# Patient Record
Sex: MALE | Race: WHITE | NOT HISPANIC OR LATINO | URBAN - METROPOLITAN AREA
[De-identification: names, ages, dates, MRNs, and addresses within clinical notes are randomized per-mention and may not be internally consistent; named-entity substitution may affect disease eponyms.]

---

## 2024-07-25 ENCOUNTER — OFFICE VISIT (OUTPATIENT)
Dept: HEMATOLOGY/ONCOLOGY | Facility: CLINIC | Age: 54
End: 2024-07-25
Payer: COMMERCIAL

## 2024-07-25 ENCOUNTER — DOCUMENTATION ONLY (OUTPATIENT)
Dept: HEMATOLOGY/ONCOLOGY | Facility: CLINIC | Age: 54
End: 2024-07-25

## 2024-07-25 VITALS
SYSTOLIC BLOOD PRESSURE: 138 MMHG | HEIGHT: 71 IN | HEART RATE: 53 BPM | DIASTOLIC BLOOD PRESSURE: 89 MMHG | OXYGEN SATURATION: 95 % | RESPIRATION RATE: 16 BRPM | WEIGHT: 197 LBS | BODY MASS INDEX: 27.58 KG/M2

## 2024-07-25 DIAGNOSIS — D48.7 NEOPLASM OF UNCERTAIN BEHAVIOR OF OTHER SPECIFIED SITES: Primary | ICD-10-CM

## 2024-07-25 DIAGNOSIS — N17.9 ACUTE RENAL FAILURE, UNSPECIFIED ACUTE RENAL FAILURE TYPE: ICD-10-CM

## 2024-07-25 DIAGNOSIS — D47.2 MGUS (MONOCLONAL GAMMOPATHY OF UNKNOWN SIGNIFICANCE): ICD-10-CM

## 2024-07-25 LAB
ALBUMIN SERPL BCP-MCNC: 4.1 G/DL (ref 3.4–5)
ALBUMIN/GLOBULIN RATIO: 1.08 RATIO (ref 1.1–1.8)
ALP SERPL-CCNC: 113 U/L (ref 46–116)
ALT SERPL W P-5'-P-CCNC: 27 U/L (ref 12–78)
ANION GAP SERPL CALC-SCNC: 8 MMOL/L (ref 3–11)
AST SERPL-CCNC: 17 U/L (ref 15–37)
BASOPHILS NFR BLD: 0.6 % (ref 0–3)
BILIRUB SERPL-MCNC: 0.5 MG/DL (ref 0–1)
BUN SERPL-MCNC: 28 MG/DL (ref 7–18)
BUN/CREAT SERPL: 14.35 RATIO (ref 7–18)
CALCIUM SERPL-MCNC: 9.2 MG/DL (ref 8.8–10.5)
CHLORIDE SERPL-SCNC: 103 MMOL/L (ref 100–108)
CO2 SERPL-SCNC: 24 MMOL/L (ref 21–32)
CREAT SERPL-MCNC: 1.95 MG/DL (ref 0.7–1.3)
EOSINOPHIL NFR BLD: 2 % (ref 1–3)
ERYTHROCYTE [DISTWIDTH] IN BLOOD BY AUTOMATED COUNT: 12.3 % (ref 12.5–18)
GFR ESTIMATION: 36
GLOBULIN: 3.8 G/DL (ref 2.3–3.5)
GLUCOSE SERPL-MCNC: 109 MG/DL (ref 70–110)
HCT VFR BLD AUTO: 51.2 % (ref 42–52)
HGB BLD-MCNC: 17.6 G/DL (ref 14–18)
LDH SERPL L TO P-CCNC: 180 U/L (ref 82–234)
LYMPHOCYTES NFR BLD: 31.8 % (ref 25–40)
MCH RBC QN AUTO: 30.7 PG (ref 27–31.2)
MCHC RBC AUTO-ENTMCNC: 34.4 G/DL (ref 31.8–35.4)
MCV RBC AUTO: 89.4 FL (ref 80–97)
MONOCYTES NFR BLD: 6.4 % (ref 1–15)
NEUTROPHILS # BLD AUTO: 4.6 10*3/UL (ref 1.8–7.7)
NEUTROPHILS NFR BLD: 58.8 % (ref 37–80)
NUCLEATED RED BLOOD CELLS: 0 %
PLATELETS: 228 10*3/UL (ref 142–424)
POTASSIUM SERPL-SCNC: 4.2 MMOL/L (ref 3.6–5.2)
PROT SERPL-MCNC: 7.9 G/DL (ref 6.4–8.2)
RBC # BLD AUTO: 5.73 10*6/UL (ref 4.7–6.1)
SODIUM BLD-SCNC: 135 MMOL/L (ref 135–145)
WBC # BLD: 7.8 10*3/UL (ref 4.6–10.2)

## 2024-07-25 NOTE — PROGRESS NOTES
HEMATOLOGY INITIAL CONSULTATION NOTE    Patient ID: Miky Carrasco is a 53 y.o. male.    Chief Complaint:  Suspicion for MGUS versus multiple myeloma    HPI:  Patient is a 53-year-old male with past medical history of hyperlipidemia who recently was evaluated by Nephrology for worsening kidney function and underwent further lab work including serum protein electrophoresis which showed findings concerning for possible MGUS versus active multiple myeloma with decline in kidney.  Patient presents to our clinic today for further evaluation.  Voices no acute complaints. Denies bone pain, activity and appetite changes.              History reviewed. No pertinent past medical history.    Family History   Problem Relation Name Age of Onset    Hypertension Mother      Diabetes Mother      Skin cancer Mother      Hypertension Father      Cancer Sister         Social History     Socioeconomic History    Marital status:    Tobacco Use    Smoking status: Every Day     Passive exposure: Current    Smokeless tobacco: Current     Types: Chew   Substance and Sexual Activity    Alcohol use: Not Currently    Drug use: Not Currently     Types: Marijuana    Sexual activity: Yes         History reviewed. No pertinent surgical history.      Review of systems:  Review of Systems   Constitutional:  Negative for activity change, appetite change, chills, diaphoresis, fatigue and unexpected weight change.   HENT:  Negative for congestion, facial swelling, hearing loss, mouth sores, trouble swallowing and voice change.    Eyes:  Negative for photophobia, pain, discharge and itching.   Respiratory:  Negative for apnea, cough, choking, chest tightness and shortness of breath.    Cardiovascular:  Negative for chest pain, palpitations and leg swelling.   Gastrointestinal:  Negative for abdominal distention, abdominal pain, anal bleeding and blood in stool.   Endocrine: Negative for cold intolerance, heat intolerance, polydipsia and  polyphagia.   Genitourinary:  Negative for difficulty urinating, dysuria, flank pain and hematuria.   Musculoskeletal:  Negative for arthralgias, back pain, joint swelling, myalgias, neck pain and neck stiffness.   Skin:  Negative for color change, pallor and wound.   Allergic/Immunologic: Negative for environmental allergies, food allergies and immunocompromised state.   Neurological:  Negative for dizziness, seizures, facial asymmetry, speech difficulty, light-headedness, numbness and headaches.   Hematological:  Negative for adenopathy. Does not bruise/bleed easily.   Psychiatric/Behavioral:  Negative for agitation, behavioral problems, confusion, decreased concentration and sleep disturbance.                                Physical Exam  Vitals and nursing note reviewed.   Constitutional:       General: He is not in acute distress.     Appearance: Normal appearance. He is not ill-appearing.   HENT:      Head: Normocephalic and atraumatic.      Nose: No congestion or rhinorrhea.   Eyes:      General: No scleral icterus.     Extraocular Movements: Extraocular movements intact.      Pupils: Pupils are equal, round, and reactive to light.   Cardiovascular:      Rate and Rhythm: Normal rate and regular rhythm.      Pulses: Normal pulses.      Heart sounds: Normal heart sounds. No murmur heard.     No gallop.   Pulmonary:      Effort: Pulmonary effort is normal. No respiratory distress.      Breath sounds: Normal breath sounds. No stridor. No wheezing or rhonchi.   Abdominal:      General: Bowel sounds are normal. There is no distension.      Palpations: There is no mass.      Tenderness: There is no abdominal tenderness. There is no guarding.   Musculoskeletal:         General: No swelling, tenderness, deformity or signs of injury. Normal range of motion.      Cervical back: Normal range of motion and neck supple. No rigidity. No muscular tenderness.      Right lower leg: No edema.      Left lower leg: No edema.    Skin:     General: Skin is warm.      Coloration: Skin is not jaundiced or pale.      Findings: No bruising or lesion.   Neurological:      General: No focal deficit present.      Mental Status: He is alert and oriented to person, place, and time.      Cranial Nerves: No cranial nerve deficit.      Sensory: No sensory deficit.      Motor: No weakness.      Gait: Gait normal.   Psychiatric:         Mood and Affect: Mood normal.         Behavior: Behavior normal.         Thought Content: Thought content normal.       Vitals:    07/25/24 0831   BP: 138/89   Pulse: (!) 53   Resp: 16   Body surface area is 2.12 meters squared.    Assessment/Plan:      ECOG 1    MGUS versus multiple myeloma:    == reviewed prior lab work done with Nephrology.  Patient is noted to have worsening renal failure and also has presence of monoclonal band on immuno fixation studies.  I will initiate workup for multiple myeloma and will include a bone marrow biopsy as well as a PET scan to rule out any osteolytic lesions.  Discussed these findings in detail with patient giving him opportunity to ask questions          Plan:  Initiate workup for multiple myeloma  Referral to IR for bone marrow biopsy  PET scan to rule out osteolytic lesions          Pt is instructed to RTC with labs for continued monitoring of treatment as instructed.     Total time spent in counseling and discussion about further management options including relevant lab work, treatment,  prognosis, medications and intended side effects was more than 60 minutes. More than 50 % of the time was spent in counseling and coordination of care.  This includes face to face time and non-face to face time preparing to see the patient (eg, review of tests), Obtaining and/or reviewing separately obtained history, Documenting clinical information in the electronic or other health record, Independently interpreting resultsand communicating results to the patient/family/caregiver, or Care  coordination.

## 2024-08-02 ENCOUNTER — OUTSIDE PLACE OF SERVICE (OUTPATIENT)
Dept: INTERVENTIONAL RADIOLOGY/VASCULAR | Facility: CLINIC | Age: 54
End: 2024-08-02
Payer: COMMERCIAL

## 2024-08-02 LAB
ABS NRBC COUNT: 0 THOU/UL (ref 0–0.01)
ABSOLUTE BASOPHIL: 0 10*3/UL (ref 0–0.3)
ABSOLUTE EOSINOPHIL: 0.1 10*3/UL (ref 0–0.6)
ABSOLUTE EOSINOPHIL: 0.1 10*3/UL (ref 0–0.6)
ABSOLUTE IMMATURE GRAN: 0.04 THOU/UL (ref 0–0.03)
ABSOLUTE LYMPHOCYTE: 2 10*3/UL (ref 1.2–4)
ABSOLUTE LYMPHOCYTE: 2.3 10*3/UL (ref 1.2–4)
ABSOLUTE MONOCYTE: 0.4 10*3/UL (ref 0.1–0.8)
ABSOLUTE MONOCYTE: 0.6 10*3/UL (ref 0.1–0.8)
ALBUMIN SERPL BCP-MCNC: 4.2 G/DL (ref 3.4–5)
ALP SERPL-CCNC: 98 U/L (ref 45–117)
ALT SERPL W P-5'-P-CCNC: 28 U/L (ref 16–61)
ANION GAP SERPL CALC-SCNC: 6 MMOL/L (ref 3–11)
APTT PPP: 34.2 SEC (ref 26–38.7)
AST SERPL-CCNC: 11 U/L (ref 15–37)
BASOPHILS NFR BLD: 0.4 % (ref 0–3)
BILIRUB SERPL-MCNC: 0.4 MG/DL (ref 0.2–1)
BUN SERPL-MCNC: 31 MG/DL (ref 7–18)
BUN/CREAT SERPL: 14.02 RATIO
CALCIUM SERPL-MCNC: 9.3 MG/DL (ref 8.5–10.1)
CELLS COUNTED: 100
CHLORIDE SERPL-SCNC: 110 MMOL/L (ref 98–107)
CO2 SERPL-SCNC: 26 MMOL/L (ref 21–32)
CREAT SERPL-MCNC: 2.21 MG/DL (ref 0.7–1.3)
EOSINOPHIL NFR BLD: 0.6 % (ref 0–6)
EOSINOPHIL NFR BLD: 1 % (ref 0–6)
ERYTHROCYTE [DISTWIDTH] IN BLOOD BY AUTOMATED COUNT: 12.1 % (ref 0–15.5)
ERYTHROCYTE [DISTWIDTH] IN BLOOD BY AUTOMATED COUNT: 12.1 % (ref 0–15.5)
GFR ESTIMATION: 31
GLUCOSE SERPL-MCNC: 122 MG/DL (ref 74–106)
HCT VFR BLD AUTO: 49.3 % (ref 42–52)
HCT VFR BLD AUTO: 49.3 % (ref 42–52)
HGB BLD-MCNC: 16.4 G/DL (ref 14–18)
HGB BLD-MCNC: 16.4 G/DL (ref 14–18)
IMMATURE GRANULOCYTES: 0.4 % (ref 0–0.43)
INR PPP: 0.9 INR (ref 0.9–1.1)
LYMPHOCYTES NFR BLD: 21.8 % (ref 20–45)
LYMPHOCYTES NFR BLD: 25 % (ref 20–45)
MCH RBC QN AUTO: 29.9 PG (ref 27–32)
MCH RBC QN AUTO: 29.9 PG (ref 27–32)
MCHC RBC AUTO-ENTMCNC: 33.3 % (ref 32–36)
MCHC RBC AUTO-ENTMCNC: 33.3 % (ref 32–36)
MCV RBC AUTO: 89.8 FL (ref 80–97)
MCV RBC AUTO: 89.8 FL (ref 80–97)
MONOCYTES NFR BLD: 4 % (ref 2–10)
MONOCYTES NFR BLD: 6.9 % (ref 2–10)
NEUTROPHILS # BLD AUTO: 6.4 10*3/UL (ref 1.8–7.7)
NEUTROPHILS # BLD AUTO: 6.5 10*3/UL (ref 1.4–7)
NEUTROPHILS NFR BLD: 69.9 % (ref 50–80)
NEUTROPHILS NFR BLD: 70 % (ref 50–80)
NUCLEATED RBC-MANUAL: 0 /100 WBC
NUCLEATED RED BLOOD CELLS: 0 % (ref 0–0.2)
PLATELET MORPHOLOGY: NORMAL
PLATELETS: 213 10*3/UL (ref 130–400)
PLATELETS: 213 10*3/UL (ref 130–400)
PMV BLD AUTO: 10.4 FL (ref 9.2–12.2)
PMV BLD AUTO: 10.4 FL (ref 9.2–12.2)
POTASSIUM SERPL-SCNC: 4.4 MMOL/L (ref 3.5–5.1)
PROT SERPL-MCNC: 7.6 G/DL (ref 6.4–8.2)
PROTHROMBIN TIME: 10.7 SEC (ref 10.2–12.9)
RBC # BLD AUTO: 5.49 10*6/UL (ref 4.7–6.1)
RBC # BLD AUTO: 5.49 10*6/UL (ref 4.7–6.1)
RBC MORPH BLD: NORMAL
SMALL PLATELETS BLD QL SMEAR: NORMAL
SODIUM BLD-SCNC: 142 MMOL/L (ref 131–143)
WBC # BLD: 9.2 10*3/UL (ref 4.5–10)
WBC # BLD: 9.2 10*3/UL (ref 4.5–10)

## 2024-08-02 PROCEDURE — 38222 DX BONE MARROW BX & ASPIR: CPT | Mod: LT,,, | Performed by: RADIOLOGY

## 2024-08-02 PROCEDURE — 77002 NEEDLE LOCALIZATION BY XRAY: CPT | Mod: 26,LT,, | Performed by: RADIOLOGY

## 2024-08-12 ENCOUNTER — TELEPHONE (OUTPATIENT)
Dept: HEMATOLOGY/ONCOLOGY | Facility: CLINIC | Age: 54
End: 2024-08-12
Payer: COMMERCIAL

## 2024-08-12 NOTE — TELEPHONE ENCOUNTER
----- Message from America Phelan sent at 8/12/2024  8:59 AM CDT -----  Contact: self  Patient is requesting a call back regarding results. Please call back at [phone]

## 2024-08-15 ENCOUNTER — OFFICE VISIT (OUTPATIENT)
Dept: HEMATOLOGY/ONCOLOGY | Facility: CLINIC | Age: 54
End: 2024-08-15
Payer: COMMERCIAL

## 2024-08-15 VITALS
HEART RATE: 60 BPM | BODY MASS INDEX: 27.09 KG/M2 | OXYGEN SATURATION: 96 % | SYSTOLIC BLOOD PRESSURE: 110 MMHG | HEIGHT: 71 IN | DIASTOLIC BLOOD PRESSURE: 74 MMHG | WEIGHT: 193.5 LBS | RESPIRATION RATE: 16 BRPM

## 2024-08-15 DIAGNOSIS — C90.00 MULTIPLE MYELOMA NOT HAVING ACHIEVED REMISSION: Primary | ICD-10-CM

## 2024-08-15 NOTE — PROGRESS NOTES
HEMATOLOGY FOLLOW UP CONSULTATION NOTE    Patient ID: Miky Carrasco is a 53 y.o. male.    Chief Complaint:  Multiple myeloma    HPI:  Patient is a 53-year-old male with past medical history of hyperlipidemia who recently was evaluated by Nephrology for worsening kidney function and underwent further lab work including serum protein electrophoresis which showed findings concerning for possible MGUS versus active multiple myeloma with decline in kidney.  Patient presents to our clinic today for further evaluation.  Voices no acute complaints. Denies bone pain, activity and appetite changes.              History reviewed. No pertinent past medical history.    Family History   Problem Relation Name Age of Onset    Hypertension Mother      Diabetes Mother      Skin cancer Mother      Hypertension Father      Cancer Sister         Social History     Socioeconomic History    Marital status:    Tobacco Use    Smoking status: Every Day     Passive exposure: Current    Smokeless tobacco: Current     Types: Chew   Substance and Sexual Activity    Alcohol use: Not Currently    Drug use: Not Currently     Types: Marijuana    Sexual activity: Yes     Social Determinants of Health     Financial Resource Strain: Low Risk  (8/14/2024)    Overall Financial Resource Strain (CARDIA)     Difficulty of Paying Living Expenses: Not very hard   Food Insecurity: Unknown (8/14/2024)    Hunger Vital Sign     Worried About Running Out of Food in the Last Year: Never true   Physical Activity: Insufficiently Active (8/14/2024)    Exercise Vital Sign     Days of Exercise per Week: 2 days     Minutes of Exercise per Session: 30 min   Stress: Stress Concern Present (8/14/2024)    [place] citizen [employer] - Occupational Stress Questionnaire     Feeling of Stress : To some extent   Housing Stability: Unknown (8/14/2024)    Housing Stability Vital Sign     Unable to Pay for Housing in the Last Year: Patient declined          History reviewed. No pertinent surgical history.      Review of systems:  Review of Systems   Constitutional:  Negative for activity change, appetite change, chills, diaphoresis, fatigue and unexpected weight change.   HENT:  Negative for congestion, facial swelling, hearing loss, mouth sores, trouble swallowing and voice change.    Eyes:  Negative for photophobia, pain, discharge and itching.   Respiratory:  Negative for apnea, cough, choking, chest tightness and shortness of breath.    Cardiovascular:  Negative for chest pain, palpitations and leg swelling.   Gastrointestinal:  Negative for abdominal distention, abdominal pain, anal bleeding and blood in stool.   Endocrine: Negative for cold intolerance, heat intolerance, polydipsia and polyphagia.   Genitourinary:  Negative for difficulty urinating, dysuria, flank pain and hematuria.   Musculoskeletal:  Negative for arthralgias, back pain, joint swelling, myalgias, neck pain and neck stiffness.   Skin:  Negative for color change, pallor and wound.   Allergic/Immunologic: Negative for environmental allergies, food allergies and immunocompromised state.   Neurological:  Negative for dizziness, seizures, facial asymmetry, speech difficulty, light-headedness, numbness and headaches.   Hematological:  Negative for adenopathy. Does not bruise/bleed easily.   Psychiatric/Behavioral:  Negative for agitation, behavioral problems, confusion, decreased concentration and sleep disturbance.                                Physical Exam  Vitals and nursing note reviewed.   Constitutional:       General: He is not in acute distress.     Appearance: Normal appearance. He is not ill-appearing.   HENT:      Head: Normocephalic and atraumatic.      Nose: No congestion or rhinorrhea.   Eyes:      General: No scleral icterus.     Extraocular Movements: Extraocular movements intact.      Pupils: Pupils are equal, round, and reactive to light.   Cardiovascular:      Rate and  Rhythm: Normal rate and regular rhythm.      Pulses: Normal pulses.      Heart sounds: Normal heart sounds. No murmur heard.     No gallop.   Pulmonary:      Effort: Pulmonary effort is normal. No respiratory distress.      Breath sounds: Normal breath sounds. No stridor. No wheezing or rhonchi.   Abdominal:      General: Bowel sounds are normal. There is no distension.      Palpations: There is no mass.      Tenderness: There is no abdominal tenderness. There is no guarding.   Musculoskeletal:         General: No swelling, tenderness, deformity or signs of injury. Normal range of motion.      Cervical back: Normal range of motion and neck supple. No rigidity. No muscular tenderness.      Right lower leg: No edema.      Left lower leg: No edema.   Skin:     General: Skin is warm.      Coloration: Skin is not jaundiced or pale.      Findings: No bruising or lesion.   Neurological:      General: No focal deficit present.      Mental Status: He is alert and oriented to person, place, and time.      Cranial Nerves: No cranial nerve deficit.      Sensory: No sensory deficit.      Motor: No weakness.      Gait: Gait normal.   Psychiatric:         Mood and Affect: Mood normal.         Behavior: Behavior normal.         Thought Content: Thought content normal.       Vitals:    08/15/24 1529   BP: 110/74   Pulse: 60   Resp: 16   Body surface area is 2.1 meters squared.    Assessment/Plan:      ECOG 1    Multiple myeloma:    == reviewed prior lab work done with Nephrology.  Patient is noted to have worsening renal failure and also has presence of monoclonal band on immuno fixation studies.  I will initiate workup for multiple myeloma and will include a bone marrow biopsy as well as a PET scan to rule out any osteolytic lesions.  Discussed these findings in detail with patient giving him opportunity to ask questions  == 8/18/24:  Bone marrow biopsy done showed increased number of plasma cells.  No percentage mentioned but  reported to be more than normal and in the smoldering range.  With evidence of worsening renal failure along with elevated kappa free light chains and kappa/lambda free light chain ratio findings are consistent with active multiple myeloma.  Due to decline in kidney function my initial plan would be to initiate CyBorD.  Patient reports that he is on a job which requires lot of traveling and unable to come to clinics  regularly do receive his treatment.           Plan:  Start CyBorD      Return to clinic in 1 week for NP visit with labs for chemo education        Pt is instructed to RTC with labs for continued monitoring of treatment as instructed.     Total time spent in counseling and discussion about further management options including relevant lab work, treatment,  prognosis, medications and intended side effects was more than 60 minutes. More than 50 % of the time was spent in counseling and coordination of care.  This includes face to face time and non-face to face time preparing to see the patient (eg, review of tests), Obtaining and/or reviewing separately obtained history, Documenting clinical information in the electronic or other health record, Independently interpreting resultsand communicating results to the patient/family/caregiver, or Care coordination.

## 2024-08-26 ENCOUNTER — TELEPHONE (OUTPATIENT)
Dept: HEMATOLOGY/ONCOLOGY | Facility: CLINIC | Age: 54
End: 2024-08-26
Payer: COMMERCIAL

## 2024-08-26 DIAGNOSIS — C90.00 MULTIPLE MYELOMA NOT HAVING ACHIEVED REMISSION: Primary | ICD-10-CM

## 2024-08-26 RX ORDER — DEXAMETHASONE 4 MG/1
40 TABLET ORAL
Qty: 40 TABLET | Refills: 5 | Status: ACTIVE | OUTPATIENT
Start: 2024-08-26

## 2024-08-26 RX ORDER — CYCLOPHOSPHAMIDE 50 MG/1
600 CAPSULE ORAL
Qty: 48 CAPSULE | Refills: 5 | Status: ACTIVE | OUTPATIENT
Start: 2024-08-26

## 2024-08-26 RX ORDER — CYCLOPHOSPHAMIDE 25 MG/1
25 CAPSULE ORAL
Qty: 4 CAPSULE | Refills: 5 | Status: ACTIVE | OUTPATIENT
Start: 2024-08-26

## 2024-09-20 ENCOUNTER — TELEPHONE (OUTPATIENT)
Dept: HEMATOLOGY/ONCOLOGY | Facility: CLINIC | Age: 54
End: 2024-09-20
Payer: COMMERCIAL

## 2024-09-20 NOTE — TELEPHONE ENCOUNTER
----- Message from Sydni Allen sent at 9/20/2024  2:55 PM CDT -----  States he is scheduled for a Pet Scan at 8:00 on Monday at [facility]. States they haven't received the authorization from Dr Saunders. Please call Nallely Daileyhan [phone]. Thank you

## 2024-09-23 ENCOUNTER — CLINICAL SUPPORT (OUTPATIENT)
Dept: HEMATOLOGY/ONCOLOGY | Facility: CLINIC | Age: 54
End: 2024-09-23
Payer: COMMERCIAL

## 2024-09-23 VITALS
HEART RATE: 56 BPM | WEIGHT: 195.13 LBS | BODY MASS INDEX: 27.32 KG/M2 | DIASTOLIC BLOOD PRESSURE: 74 MMHG | OXYGEN SATURATION: 94 % | TEMPERATURE: 98 F | SYSTOLIC BLOOD PRESSURE: 137 MMHG | HEIGHT: 71 IN | RESPIRATION RATE: 16 BRPM

## 2024-09-23 DIAGNOSIS — C90.00 MULTIPLE MYELOMA NOT HAVING ACHIEVED REMISSION: Primary | ICD-10-CM

## 2024-09-23 DIAGNOSIS — Z79.899 IMMUNODEFICIENCY DUE TO CHEMOTHERAPY: ICD-10-CM

## 2024-09-23 DIAGNOSIS — T45.1X5A IMMUNODEFICIENCY DUE TO CHEMOTHERAPY: ICD-10-CM

## 2024-09-23 DIAGNOSIS — D84.821 IMMUNODEFICIENCY DUE TO CHEMOTHERAPY: ICD-10-CM

## 2024-09-23 PROBLEM — Z79.69 IMMUNODEFICIENCY DUE TO CHEMOTHERAPY: Status: ACTIVE | Noted: 2024-09-23

## 2024-09-23 LAB
ALBUMIN SERPL BCP-MCNC: 3.7 G/DL (ref 3.4–5)
ALBUMIN/GLOBULIN RATIO: 1.09 RATIO (ref 1.1–1.8)
ALP SERPL-CCNC: 102 U/L (ref 46–116)
ALT SERPL W P-5'-P-CCNC: 23 U/L (ref 12–78)
ANION GAP SERPL CALC-SCNC: 8 MMOL/L (ref 3–11)
AST SERPL-CCNC: 14 U/L (ref 15–37)
BASOPHILS NFR BLD: 0.5 % (ref 0–3)
BILIRUB SERPL-MCNC: 0.3 MG/DL (ref 0–1)
BUN SERPL-MCNC: 31 MG/DL (ref 7–18)
BUN/CREAT SERPL: 13.77 RATIO (ref 7–18)
CALCIUM SERPL-MCNC: 9.6 MG/DL (ref 8.8–10.5)
CHLORIDE SERPL-SCNC: 106 MMOL/L (ref 100–108)
CO2 SERPL-SCNC: 28 MMOL/L (ref 21–32)
CREAT SERPL-MCNC: 2.25 MG/DL (ref 0.7–1.3)
EOSINOPHIL NFR BLD: 1.7 % (ref 1–3)
ERYTHROCYTE [DISTWIDTH] IN BLOOD BY AUTOMATED COUNT: 12.4 % (ref 12.5–18)
GFR ESTIMATION: 31
GLOBULIN: 3.4 G/DL (ref 2.3–3.5)
GLUCOSE SERPL-MCNC: 96 MG/DL (ref 70–110)
HCT VFR BLD AUTO: 48.1 % (ref 42–52)
HGB BLD-MCNC: 16.3 G/DL (ref 14–18)
LYMPHOCYTES NFR BLD: 25.6 % (ref 25–40)
MAGNESIUM SERPL-MCNC: 2.3 MG/DL (ref 1.8–2.4)
MCH RBC QN AUTO: 30.5 PG (ref 27–31.2)
MCHC RBC AUTO-ENTMCNC: 33.9 G/DL (ref 31.8–35.4)
MCV RBC AUTO: 89.9 FL (ref 80–97)
MONOCYTES NFR BLD: 7.6 % (ref 1–15)
NEUTROPHILS # BLD AUTO: 5.89 10*3/UL (ref 1.8–7.7)
NEUTROPHILS NFR BLD: 63.9 % (ref 37–80)
NUCLEATED RED BLOOD CELLS: 0 %
PHOSPHATE FLD-MCNC: 3.2 MG/DL (ref 2.6–4.7)
PLATELETS: 218 10*3/UL (ref 142–424)
POTASSIUM SERPL-SCNC: 4.4 MMOL/L (ref 3.6–5.2)
PROT SERPL-MCNC: 7.1 G/DL (ref 6.4–8.2)
RBC # BLD AUTO: 5.35 10*6/UL (ref 4.7–6.1)
SODIUM BLD-SCNC: 142 MMOL/L (ref 135–145)
WBC # BLD: 9.2 10*3/UL (ref 4.6–10.2)

## 2024-09-23 PROCEDURE — 99215 OFFICE O/P EST HI 40 MIN: CPT | Mod: S$PBB,,, | Performed by: NURSE PRACTITIONER

## 2024-09-23 RX ORDER — BORTEZOMIB 3.5 MG/1
1.5 INJECTION, POWDER, LYOPHILIZED, FOR SOLUTION INTRAVENOUS; SUBCUTANEOUS
OUTPATIENT
Start: 2024-10-04

## 2024-09-23 RX ORDER — HEPARIN 100 UNIT/ML
500 SYRINGE INTRAVENOUS
Status: CANCELLED | OUTPATIENT
Start: 2024-09-27

## 2024-09-23 RX ORDER — BORTEZOMIB 3.5 MG/1
1.5 INJECTION, POWDER, LYOPHILIZED, FOR SOLUTION INTRAVENOUS; SUBCUTANEOUS
OUTPATIENT
Start: 2024-10-18

## 2024-09-23 RX ORDER — ACYCLOVIR 400 MG/1
400 TABLET ORAL DAILY
Qty: 30 TABLET | Refills: 11 | Status: SHIPPED | OUTPATIENT
Start: 2024-09-23 | End: 2025-09-23

## 2024-09-23 RX ORDER — BORTEZOMIB 3.5 MG/1
1.5 INJECTION, POWDER, LYOPHILIZED, FOR SOLUTION INTRAVENOUS; SUBCUTANEOUS
OUTPATIENT
Start: 2024-10-11

## 2024-09-23 RX ORDER — PALONOSETRON 0.05 MG/ML
0.25 INJECTION, SOLUTION INTRAVENOUS
Status: CANCELLED | OUTPATIENT
Start: 2024-09-27

## 2024-09-23 RX ORDER — PALONOSETRON 0.05 MG/ML
0.25 INJECTION, SOLUTION INTRAVENOUS
OUTPATIENT
Start: 2024-10-04

## 2024-09-23 RX ORDER — SODIUM CHLORIDE 0.9 % (FLUSH) 0.9 %
10 SYRINGE (ML) INJECTION
OUTPATIENT
Start: 2024-10-04

## 2024-09-23 RX ORDER — PALONOSETRON 0.05 MG/ML
0.25 INJECTION, SOLUTION INTRAVENOUS
OUTPATIENT
Start: 2024-10-18

## 2024-09-23 RX ORDER — HEPARIN 100 UNIT/ML
500 SYRINGE INTRAVENOUS
OUTPATIENT
Start: 2024-10-18

## 2024-09-23 RX ORDER — HEPARIN 100 UNIT/ML
500 SYRINGE INTRAVENOUS
OUTPATIENT
Start: 2024-10-11

## 2024-09-23 RX ORDER — SODIUM CHLORIDE 0.9 % (FLUSH) 0.9 %
10 SYRINGE (ML) INJECTION
Status: CANCELLED | OUTPATIENT
Start: 2024-09-27

## 2024-09-23 RX ORDER — HEPARIN 100 UNIT/ML
500 SYRINGE INTRAVENOUS
OUTPATIENT
Start: 2024-10-04

## 2024-09-23 RX ORDER — SODIUM CHLORIDE 0.9 % (FLUSH) 0.9 %
10 SYRINGE (ML) INJECTION
OUTPATIENT
Start: 2024-10-18

## 2024-09-23 RX ORDER — PALONOSETRON 0.05 MG/ML
0.25 INJECTION, SOLUTION INTRAVENOUS
OUTPATIENT
Start: 2024-10-11

## 2024-09-23 RX ORDER — VALACYCLOVIR HYDROCHLORIDE 500 MG/1
500 TABLET, FILM COATED ORAL DAILY
Qty: 30 TABLET | Refills: 0 | Status: CANCELLED | OUTPATIENT
Start: 2024-09-23 | End: 2024-10-23

## 2024-09-23 RX ORDER — BORTEZOMIB 3.5 MG/1
1.5 INJECTION, POWDER, LYOPHILIZED, FOR SOLUTION INTRAVENOUS; SUBCUTANEOUS
Status: CANCELLED | OUTPATIENT
Start: 2024-09-27

## 2024-09-23 RX ORDER — SULFAMETHOXAZOLE AND TRIMETHOPRIM 400; 80 MG/1; MG/1
1 TABLET ORAL DAILY
Qty: 30 TABLET | Refills: 11 | Status: SHIPPED | OUTPATIENT
Start: 2024-09-23 | End: 2025-09-18

## 2024-09-23 RX ORDER — SODIUM CHLORIDE 0.9 % (FLUSH) 0.9 %
10 SYRINGE (ML) INJECTION
OUTPATIENT
Start: 2024-10-11

## 2024-09-23 NOTE — PROGRESS NOTES
HEMATOLOGY FOLLOW UP CONSULTATION NOTE    Patient ID: Miky Carrasco is a 53 y.o. male.    Chief Complaint:  Multiple myeloma    HPI:  Patient is a 53-year-old male with past medical history of hyperlipidemia who recently was evaluated by Nephrology for worsening kidney function and underwent further lab work including serum protein electrophoresis which showed findings concerning for possible MGUS versus active multiple myeloma with decline in kidney.  Patient presents to our clinic today for further evaluation.  Voices no acute complaints. Denies bone pain, activity and appetite changes.              History reviewed. No pertinent past medical history.    Family History   Problem Relation Name Age of Onset    Hypertension Mother      Diabetes Mother      Skin cancer Mother      Hypertension Father      Cancer Sister         Social History     Socioeconomic History    Marital status:    Tobacco Use    Smoking status: Every Day     Passive exposure: Current    Smokeless tobacco: Current     Types: Chew   Substance and Sexual Activity    Alcohol use: Not Currently    Drug use: Not Currently     Types: Marijuana    Sexual activity: Yes     Social Determinants of Health     Financial Resource Strain: Low Risk  (8/14/2024)    Overall Financial Resource Strain (CARDIA)     Difficulty of Paying Living Expenses: Not very hard   Food Insecurity: Unknown (8/14/2024)    Hunger Vital Sign     Worried About Running Out of Food in the Last Year: Never true   Physical Activity: Insufficiently Active (8/14/2024)    Exercise Vital Sign     Days of Exercise per Week: 2 days     Minutes of Exercise per Session: 30 min   Stress: Stress Concern Present (8/14/2024)    [place] [employer] - Occupational Stress Questionnaire     Feeling of Stress : To some extent   Housing Stability: Unknown (8/14/2024)    Housing Stability Vital Sign     Unable to Pay for Housing in the Last Year: Patient declined          History reviewed. No pertinent surgical history.      Review of systems:  Review of Systems   Constitutional:  Negative for activity change, appetite change, chills, diaphoresis, fatigue and unexpected weight change.   HENT:  Negative for congestion, facial swelling, hearing loss, mouth sores, trouble swallowing and voice change.    Eyes:  Negative for photophobia, pain, discharge and itching.   Respiratory:  Negative for apnea, cough, choking, chest tightness and shortness of breath.    Cardiovascular:  Negative for chest pain, palpitations and leg swelling.   Gastrointestinal:  Negative for abdominal distention, abdominal pain, anal bleeding and blood in stool.   Endocrine: Negative for cold intolerance, heat intolerance, polydipsia and polyphagia.   Genitourinary:  Negative for difficulty urinating, dysuria, flank pain and hematuria.   Musculoskeletal:  Negative for arthralgias, back pain, joint swelling, myalgias, neck pain and neck stiffness.   Skin:  Negative for color change, pallor and wound.   Allergic/Immunologic: Negative for environmental allergies, food allergies and immunocompromised state.   Neurological:  Negative for dizziness, seizures, facial asymmetry, speech difficulty, light-headedness, numbness and headaches.   Hematological:  Negative for adenopathy. Does not bruise/bleed easily.   Psychiatric/Behavioral:  Negative for agitation, behavioral problems, confusion, decreased concentration and sleep disturbance.                                Physical Exam  Vitals and nursing note reviewed.   Constitutional:       General: He is not in acute distress.     Appearance: Normal appearance. He is not ill-appearing.   HENT:      Head: Normocephalic and atraumatic.      Nose: No congestion or rhinorrhea.   Eyes:      General: No scleral icterus.     Extraocular Movements: Extraocular movements intact.      Pupils: Pupils are equal, round, and reactive to light.   Cardiovascular:      Rate and  Rhythm: Normal rate and regular rhythm.      Pulses: Normal pulses.      Heart sounds: Normal heart sounds. No murmur heard.     No gallop.   Pulmonary:      Effort: Pulmonary effort is normal. No respiratory distress.      Breath sounds: Normal breath sounds. No stridor. No wheezing or rhonchi.   Abdominal:      General: Bowel sounds are normal. There is no distension.      Palpations: There is no mass.      Tenderness: There is no abdominal tenderness. There is no guarding.   Musculoskeletal:         General: No swelling, tenderness, deformity or signs of injury. Normal range of motion.      Cervical back: Normal range of motion and neck supple. No rigidity. No muscular tenderness.      Right lower leg: No edema.      Left lower leg: No edema.   Skin:     General: Skin is warm.      Coloration: Skin is not jaundiced or pale.      Findings: No bruising or lesion.   Neurological:      General: No focal deficit present.      Mental Status: He is alert and oriented to person, place, and time.      Cranial Nerves: No cranial nerve deficit.      Sensory: No sensory deficit.      Motor: No weakness.      Gait: Gait normal.   Psychiatric:         Mood and Affect: Mood normal.         Behavior: Behavior normal.         Thought Content: Thought content normal.       Vitals:    09/23/24 1337   BP: 137/74   Pulse: (!) 56   Resp: 16   Temp: 98 °F (36.7 °C)   Body surface area is 2.11 meters squared.    Assessment/Plan:      ECOG 1    Multiple myeloma:    == reviewed prior lab work done with Nephrology.  Patient is noted to have worsening renal failure and also has presence of monoclonal band on immuno fixation studies.  I will initiate workup for multiple myeloma and will include a bone marrow biopsy as well as a PET scan to rule out any osteolytic lesions.  Discussed these findings in detail with patient giving him opportunity to ask questions  == 8/18/24:  Bone marrow biopsy done showed increased number of plasma cells.  No  percentage mentioned but reported to be more than normal and in the smoldering range.  With evidence of worsening renal failure along with elevated kappa free light chains and kappa/lambda free light chain ratio findings are consistent with active multiple myeloma.  Due to decline in kidney function my initial plan would be to initiate CyBorD.  Patient reports that he is on a job which requires lot of traveling and unable to come to clinics  regularly do receive his treatment.   ==9/23/24:  Patient and wife here today to discuss upcoming chemotherapy/immunotherapy. An extensive discussion was had which included a thorough discussion of potential side effect profile, risk versus benefits, and expected outcomes.  Risks, including but not limited to, possible hair loss, bone marrow damage, damage to body organs, allergic reactions, sterility, nausea/vomiting, constipation/diarrhea, sores in the mouth, secondary cancers, and rarely death were all discuss.  Specific side effects pertaining to their chemotherapy/immunotherapy medications were discussed as well.  The patient agrees with the plan and all questions and their support systems questions have been answered to their satisfaction.  Premedications were prescribed and patient was educated on appropriate usage.  Patient was educated on when to call, and given the number to call, or to notify the provider including but not limited to:  Persistent nausea and vomiting, dehydration, fever greater than 100.4 lasting over 1 hour induration or isolated feeders greater than 101, rash while on active chemotherapy or immunotherapy, severe pain or new onset pain not controlled by current pain medication regimen.       2. Prophylaxis for infection prevention  Bactrim an acyclovir sent to pharmacy  Advised to get Flu shot today     3. Muscle cramps to lower extremities  Will check phos and mag level     Plan:  Start CyBorD on Friday 9/27/24  Bactrim and valtrex sent to pharmacy    Cbc cmp q weekly   Myeloma labs q 4 weeks     Return to clinic in 1 week for NP visit with labs    Pt is instructed to RTC with labs for continued monitoring of treatment as instructed.     Total time spent in counseling and discussion about further management options including relevant lab work, treatment,  prognosis, medications and intended side effects was more than 60 minutes. More than 50 % of the time was spent in counseling and coordination of care.  This includes face to face time and non-face to face time preparing to see the patient (eg, review of tests), Obtaining and/or reviewing separately obtained history, Documenting clinical information in the electronic or other health record, Independently interpreting resultsand communicating results to the patient/family/caregiver, or Care coordination.

## 2024-09-26 LAB
IGA SERPL-MCNC: 60 MG/DL (ref 68–408)
IGG SERPL-MCNC: 800 MG/DL (ref 768–1632)
IGM: 34 MG/DL (ref 35–263)
KAPPA FREE LIGHT CHAINS: 593.65 MG/L (ref 3.3–19.4)
KAPPA/LAMBDA FLC RATIO: 66.7 (ref 0.26–1.65)
LAMBDA LIGHT CHAIN, FREE, SERUM: 8.9 MG/L (ref 5.71–26.3)

## 2024-10-04 ENCOUNTER — OFFICE VISIT (OUTPATIENT)
Dept: HEMATOLOGY/ONCOLOGY | Facility: CLINIC | Age: 54
End: 2024-10-04
Payer: COMMERCIAL

## 2024-10-04 ENCOUNTER — TELEPHONE (OUTPATIENT)
Dept: HEMATOLOGY/ONCOLOGY | Facility: CLINIC | Age: 54
End: 2024-10-04

## 2024-10-04 ENCOUNTER — PATIENT MESSAGE (OUTPATIENT)
Dept: HEMATOLOGY/ONCOLOGY | Facility: CLINIC | Age: 54
End: 2024-10-04

## 2024-10-04 VITALS
WEIGHT: 194.13 LBS | BODY MASS INDEX: 27.18 KG/M2 | DIASTOLIC BLOOD PRESSURE: 84 MMHG | HEIGHT: 71 IN | OXYGEN SATURATION: 96 % | HEART RATE: 66 BPM | TEMPERATURE: 98 F | RESPIRATION RATE: 16 BRPM | SYSTOLIC BLOOD PRESSURE: 131 MMHG

## 2024-10-04 DIAGNOSIS — N17.9 ACUTE RENAL FAILURE, UNSPECIFIED ACUTE RENAL FAILURE TYPE: ICD-10-CM

## 2024-10-04 DIAGNOSIS — D84.821 IMMUNODEFICIENCY DUE TO CHEMOTHERAPY: ICD-10-CM

## 2024-10-04 DIAGNOSIS — T45.1X5A IMMUNODEFICIENCY DUE TO CHEMOTHERAPY: ICD-10-CM

## 2024-10-04 DIAGNOSIS — Z79.899 IMMUNODEFICIENCY DUE TO CHEMOTHERAPY: ICD-10-CM

## 2024-10-04 DIAGNOSIS — C90.00 MULTIPLE MYELOMA NOT HAVING ACHIEVED REMISSION: Primary | ICD-10-CM

## 2024-10-04 NOTE — PROGRESS NOTES
HEMATOLOGY FOLLOW UP CONSULTATION NOTE    Patient ID: Miky Carrasco is a 53 y.o. male.    Chief Complaint:  Multiple myeloma    HPI:  Patient is a 53-year-old male with past medical history of hyperlipidemia who recently was evaluated by Nephrology for worsening kidney function and underwent further lab work including serum protein electrophoresis which showed findings concerning for possible MGUS versus active multiple myeloma with decline in kidney.  Patient presents to our clinic today for further evaluation.  Voices no acute complaints. Denies bone pain, activity and appetite changes.              No past medical history on file.    Family History   Problem Relation Name Age of Onset    Hypertension Mother      Diabetes Mother      Skin cancer Mother      Hypertension Father      Cancer Sister         Social History     Socioeconomic History    Marital status:    Tobacco Use    Smoking status: Every Day     Passive exposure: Current    Smokeless tobacco: Current     Types: Chew   Substance and Sexual Activity    Alcohol use: Not Currently    Drug use: Not Currently     Types: Marijuana    Sexual activity: Yes     Social Drivers of Health     Financial Resource Strain: Low Risk  (8/14/2024)    Overall Financial Resource Strain (CARDIA)     Difficulty of Paying Living Expenses: Not very hard   Food Insecurity: Unknown (8/14/2024)    Hunger Vital Sign     Worried About Running Out of Food in the Last Year: Never true   Physical Activity: Insufficiently Active (8/14/2024)    Exercise Vital Sign     Days of Exercise per Week: 2 days     Minutes of Exercise per Session: 30 min   Stress: Stress Concern Present (8/14/2024)    [employer] - Occupational Stress Questionnaire     Feeling of Stress : To some extent   Housing Stability: Unknown (8/14/2024)    Housing Stability Vital Sign     Unable to Pay for Housing in the Last Year: Patient declined         No past surgical history on  file.      Review of systems:  Review of Systems   Constitutional:  Negative for activity change, appetite change, chills, diaphoresis, fatigue and unexpected weight change.   HENT:  Negative for congestion, facial swelling, hearing loss, mouth sores, trouble swallowing and voice change.    Eyes:  Negative for photophobia, pain, discharge and itching.   Respiratory:  Negative for apnea, cough, choking, chest tightness and shortness of breath.    Cardiovascular:  Negative for chest pain, palpitations and leg swelling.   Gastrointestinal:  Negative for abdominal distention, abdominal pain, anal bleeding and blood in stool.   Endocrine: Negative for cold intolerance, heat intolerance, polydipsia and polyphagia.   Genitourinary:  Negative for difficulty urinating, dysuria, flank pain and hematuria.   Musculoskeletal:  Negative for arthralgias, back pain, joint swelling, myalgias, neck pain and neck stiffness.   Skin:  Negative for color change, pallor and wound.   Allergic/Immunologic: Negative for environmental allergies, food allergies and immunocompromised state.   Neurological:  Negative for dizziness, seizures, facial asymmetry, speech difficulty, light-headedness, numbness and headaches.   Hematological:  Negative for adenopathy. Does not bruise/bleed easily.   Psychiatric/Behavioral:  Negative for agitation, behavioral problems, confusion, decreased concentration and sleep disturbance.                                Physical Exam  Vitals and nursing note reviewed.   Constitutional:       General: He is not in acute distress.     Appearance: Normal appearance. He is not ill-appearing.   HENT:      Head: Normocephalic and atraumatic.      Nose: No congestion or rhinorrhea.   Eyes:      General: No scleral icterus.     Extraocular Movements: Extraocular movements intact.      Pupils: Pupils are equal, round, and reactive to light.   Cardiovascular:      Rate and Rhythm: Normal rate and regular rhythm.      Pulses:  Normal pulses.      Heart sounds: Normal heart sounds. No murmur heard.     No gallop.   Pulmonary:      Effort: Pulmonary effort is normal. No respiratory distress.      Breath sounds: Normal breath sounds. No stridor. No wheezing or rhonchi.   Abdominal:      General: Bowel sounds are normal. There is no distension.      Palpations: There is no mass.      Tenderness: There is no abdominal tenderness. There is no guarding.   Musculoskeletal:         General: No swelling, tenderness, deformity or signs of injury. Normal range of motion.      Cervical back: Normal range of motion and neck supple. No rigidity. No muscular tenderness.      Right lower leg: No edema.      Left lower leg: No edema.   Skin:     General: Skin is warm.      Coloration: Skin is not jaundiced or pale.      Findings: No bruising or lesion.   Neurological:      General: No focal deficit present.      Mental Status: He is alert and oriented to person, place, and time.      Cranial Nerves: No cranial nerve deficit.      Sensory: No sensory deficit.      Motor: No weakness.      Gait: Gait normal.   Psychiatric:         Mood and Affect: Mood normal.         Behavior: Behavior normal.         Thought Content: Thought content normal.     There were no vitals filed for this visit.  There is no height or weight on file to calculate BSA.    Assessment/Plan:      ECOG 1    Multiple myeloma:    == reviewed prior lab work done with Nephrology.  Patient is noted to have worsening renal failure and also has presence of monoclonal band on immuno fixation studies.  I will initiate workup for multiple myeloma and will include a bone marrow biopsy as well as a PET scan to rule out any osteolytic lesions.  Discussed these findings in detail with patient giving him opportunity to ask questions  == 8/18/24:  Bone marrow biopsy done showed increased number of plasma cells.  No percentage mentioned but reported to be more than normal and in the smoldering range.   With evidence of worsening renal failure along with elevated kappa free light chains and kappa/lambda free light chain ratio findings are consistent with active multiple myeloma.  Due to decline in kidney function my initial plan would be to initiate CyBorD.  Patient reports that he is on a job which requires lot of traveling and unable to come to clinics  regularly do receive his treatment.   ==9/23/24:  Patient and wife here today to discuss upcoming chemotherapy/immunotherapy. An extensive discussion was had which included a thorough discussion of potential side effect profile, risk versus benefits, and expected outcomes.  Risks, including but not limited to, possible hair loss, bone marrow damage, damage to body organs, allergic reactions, sterility, nausea/vomiting, constipation/diarrhea, sores in the mouth, secondary cancers, and rarely death were all discuss.  Specific side effects pertaining to their chemotherapy/immunotherapy medications were discussed as well.  The patient agrees with the plan and all questions and their support systems questions have been answered to their satisfaction.  Premedications were prescribed and patient was educated on appropriate usage.  Patient was educated on when to call, and given the number to call, or to notify the provider including but not limited to:  Persistent nausea and vomiting, dehydration, fever greater than 100.4 lasting over 1 hour induration or isolated feeders greater than 101, rash while on active chemotherapy or immunotherapy, severe pain or new onset pain not controlled by current pain medication regimen.   ==10/04/2024: Tolerating chemotherapy well, pt has leg cramps has had all of his life worse at night, potassium mg normal, discussed medication, pt would like to try otc topicals such as biofreeze or icyhot with lidocaine first and if not helpful will notify clinic and will send our rx.  No other complaints, will continue chemotherapy, plan to see next  week and if continues to tolerate well plan to see every other week.      2. Prophylaxis for infection prevention  Bactrim an acyclovir sent to pharmacy  Advised to get Flu shot today     3. Muscle cramps to lower extremities  Will check phos and mag level     Plan:  RTC 1 week  Start CyBorD - weekly chemotherapy  Continue Bactrim and valtrex prophylaxis  Cbc cmp q weekly   Myeloma labs q 4 weeks         Pt is instructed to RTC with labs for continued monitoring of treatment as instructed.     Total time spent in counseling and discussion about further management options including relevant lab work, treatment,  prognosis, medications and intended side effects was more than 30 minutes. More than 50 % of the time was spent in counseling and coordination of care.  This includes face to face time and non-face to face time preparing to see the patient (eg, review of tests), Obtaining and/or reviewing separately obtained history, Documenting clinical information in the electronic or other health record, Independently interpreting resultsand communicating results to the patient/family/caregiver, or Care coordination.

## 2024-10-11 ENCOUNTER — OFFICE VISIT (OUTPATIENT)
Dept: HEMATOLOGY/ONCOLOGY | Facility: CLINIC | Age: 54
End: 2024-10-11
Payer: COMMERCIAL

## 2024-10-11 VITALS
HEIGHT: 71 IN | SYSTOLIC BLOOD PRESSURE: 145 MMHG | HEART RATE: 65 BPM | BODY MASS INDEX: 27.07 KG/M2 | OXYGEN SATURATION: 93 % | WEIGHT: 193.38 LBS | RESPIRATION RATE: 16 BRPM | DIASTOLIC BLOOD PRESSURE: 76 MMHG | TEMPERATURE: 97 F

## 2024-10-11 DIAGNOSIS — E83.39 HYPOPHOSPHATEMIA: ICD-10-CM

## 2024-10-11 DIAGNOSIS — N17.9 ACUTE RENAL FAILURE, UNSPECIFIED ACUTE RENAL FAILURE TYPE: ICD-10-CM

## 2024-10-11 DIAGNOSIS — C90.00 MULTIPLE MYELOMA NOT HAVING ACHIEVED REMISSION: Primary | ICD-10-CM

## 2024-10-11 DIAGNOSIS — D84.821 IMMUNODEFICIENCY DUE TO CHEMOTHERAPY: ICD-10-CM

## 2024-10-11 DIAGNOSIS — T45.1X5A IMMUNODEFICIENCY DUE TO CHEMOTHERAPY: ICD-10-CM

## 2024-10-11 DIAGNOSIS — Z79.899 IMMUNODEFICIENCY DUE TO CHEMOTHERAPY: ICD-10-CM

## 2024-10-11 NOTE — PROGRESS NOTES
HEMATOLOGY FOLLOW UP CONSULTATION NOTE    Patient ID: Miky Carrasco is a 53 y.o. male.    Chief Complaint:  Multiple myeloma    HPI:  Patient is a 53-year-old male with past medical history of hyperlipidemia who recently was evaluated by Nephrology for worsening kidney function and underwent further lab work including serum protein electrophoresis which showed findings concerning for possible MGUS versus active multiple myeloma with decline in kidney.  Patient presents to our clinic today for further evaluation.  Voices no acute complaints. Denies bone pain, activity and appetite changes.              No past medical history on file.    Family History   Problem Relation Name Age of Onset    Hypertension Mother      Diabetes Mother      Skin cancer Mother      Hypertension Father      Cancer Sister         Social History     Socioeconomic History    Marital status:    Tobacco Use    Smoking status: Every Day     Passive exposure: Current    Smokeless tobacco: Current     Types: Chew   Substance and Sexual Activity    Alcohol use: Not Currently    Drug use: Not Currently     Types: Marijuana    Sexual activity: Yes     Social Drivers of Health     Financial Resource Strain: Low Risk  (8/14/2024)    Overall Financial Resource Strain (CARDIA)     Difficulty of Paying Living Expenses: Not very hard   Food Insecurity: Unknown (8/14/2024)    Hunger Vital Sign     Worried About Running Out of Food in the Last Year: Never true   Physical Activity: Insufficiently Active (8/14/2024)    Exercise Vital Sign     Days of Exercise per Week: 2 days     Minutes of Exercise per Session: 30 min   Stress: Stress Concern Present (8/14/2024)    [employer] - Occupational Stress Questionnaire     Feeling of Stress : To some extent   Housing Stability: Unknown (8/14/2024)    Housing Stability Vital Sign     Unable to Pay for Housing in the Last Year: Patient declined         No past surgical history on  file.      Review of systems:  Review of Systems   Constitutional:  Negative for activity change, appetite change, chills, diaphoresis, fatigue and unexpected weight change.   HENT:  Negative for congestion, facial swelling, hearing loss, mouth sores, trouble swallowing and voice change.    Eyes:  Negative for photophobia, pain, discharge and itching.   Respiratory:  Negative for apnea, cough, choking, chest tightness and shortness of breath.    Cardiovascular:  Negative for chest pain, palpitations and leg swelling.   Gastrointestinal:  Negative for abdominal distention, abdominal pain, anal bleeding and blood in stool.   Endocrine: Negative for cold intolerance, heat intolerance, polydipsia and polyphagia.   Genitourinary:  Negative for difficulty urinating, dysuria, flank pain and hematuria.   Musculoskeletal:  Negative for arthralgias, back pain, joint swelling, myalgias, neck pain and neck stiffness.   Skin:  Negative for color change, pallor and wound.   Allergic/Immunologic: Negative for environmental allergies, food allergies and immunocompromised state.   Neurological:  Negative for dizziness, seizures, facial asymmetry, speech difficulty, light-headedness, numbness and headaches.   Hematological:  Negative for adenopathy. Does not bruise/bleed easily.   Psychiatric/Behavioral:  Negative for agitation, behavioral problems, confusion, decreased concentration and sleep disturbance.                                Physical Exam  Vitals and nursing note reviewed.   Constitutional:       General: He is not in acute distress.     Appearance: Normal appearance. He is not ill-appearing.   HENT:      Head: Normocephalic and atraumatic.      Nose: No congestion or rhinorrhea.   Eyes:      General: No scleral icterus.     Extraocular Movements: Extraocular movements intact.      Pupils: Pupils are equal, round, and reactive to light.   Cardiovascular:      Rate and Rhythm: Normal rate and regular rhythm.      Pulses:  Normal pulses.      Heart sounds: Normal heart sounds. No murmur heard.     No gallop.   Pulmonary:      Effort: Pulmonary effort is normal. No respiratory distress.      Breath sounds: Normal breath sounds. No stridor. No wheezing or rhonchi.   Abdominal:      General: Bowel sounds are normal. There is no distension.      Palpations: There is no mass.      Tenderness: There is no abdominal tenderness. There is no guarding.   Musculoskeletal:         General: No swelling, tenderness, deformity or signs of injury. Normal range of motion.      Cervical back: Normal range of motion and neck supple. No rigidity. No muscular tenderness.      Right lower leg: No edema.      Left lower leg: No edema.   Skin:     General: Skin is warm.      Coloration: Skin is not jaundiced or pale.      Findings: No bruising or lesion.   Neurological:      General: No focal deficit present.      Mental Status: He is alert and oriented to person, place, and time.      Cranial Nerves: No cranial nerve deficit.      Sensory: No sensory deficit.      Motor: No weakness.      Gait: Gait normal.   Psychiatric:         Mood and Affect: Mood normal.         Behavior: Behavior normal.         Thought Content: Thought content normal.     There were no vitals filed for this visit.  There is no height or weight on file to calculate BSA.    Assessment/Plan:      ECOG 1    Multiple myeloma:    == reviewed prior lab work done with Nephrology.  Patient is noted to have worsening renal failure and also has presence of monoclonal band on immuno fixation studies.  I will initiate workup for multiple myeloma and will include a bone marrow biopsy as well as a PET scan to rule out any osteolytic lesions.  Discussed these findings in detail with patient giving him opportunity to ask questions  == 8/18/24:  Bone marrow biopsy done showed increased number of plasma cells.  No percentage mentioned but reported to be more than normal and in the smoldering range.   With evidence of worsening renal failure along with elevated kappa free light chains and kappa/lambda free light chain ratio findings are consistent with active multiple myeloma.  Due to decline in kidney function my initial plan would be to initiate CyBorD.  Patient reports that he is on a job which requires lot of traveling and unable to come to clinics  regularly do receive his treatment.   ==9/23/24:  Patient and wife here today to discuss upcoming chemotherapy/immunotherapy. An extensive discussion was had which included a thorough discussion of potential side effect profile, risk versus benefits, and expected outcomes.  Risks, including but not limited to, possible hair loss, bone marrow damage, damage to body organs, allergic reactions, sterility, nausea/vomiting, constipation/diarrhea, sores in the mouth, secondary cancers, and rarely death were all discuss.  Specific side effects pertaining to their chemotherapy/immunotherapy medications were discussed as well.  The patient agrees with the plan and all questions and their support systems questions have been answered to their satisfaction.  Premedications were prescribed and patient was educated on appropriate usage.  Patient was educated on when to call, and given the number to call, or to notify the provider including but not limited to:  Persistent nausea and vomiting, dehydration, fever greater than 100.4 lasting over 1 hour induration or isolated feeders greater than 101, rash while on active chemotherapy or immunotherapy, severe pain or new onset pain not controlled by current pain medication regimen.   ==10/04/2024: Tolerating chemotherapy well, pt has leg cramps has had all of his life worse at night, potassium mg normal, discussed medication, pt would like to try otc topicals such as biofreeze or icyhot with lidocaine first and if not helpful will notify clinic and will send our rx.  No other complaints, will continue chemotherapy, plan to see next  week and if continues to tolerate well plan to see every other week.  ==10/11/2024: Magnesium wnl, but hypophosphatemia noted, pt denies diarrhea or antacid use.  Will start gentle supplementation with po multivitamin, discussed with patient his renal function and may need po phosphate if repeat labs show continued low phosphorus, but black box warning in CKD for po phosphate.  Will try multivitamin containing phos as well as increasing dietary intake - reviewed dietary sources of phosphorus.  Will also check vitamin D level, Vit D deficiency can cause/worsen hypophosphatemia and recheck phos level to determine if transient.  Pt is asymptomatic at this time has occasional leg cramps for all of his life, no increase or other symptoms.  Labs reviewed will continue chemotherapy       2. Prophylaxis for infection prevention  Bactrim an acyclovir sent to pharmacy  Advised to get Flu shot today     3. Muscle cramps to lower extremities  Will check phos and mag level    4. Hypophosphatemia  ==Asymptomatic, normal magnesium level - pt has had leg cramps all of life, no increase previous phos levels wnl  ==Recheck to determine if transient, avoid antacids, check vitamin D level  ==encourage po intake, will start multivitamin containing phosphorus, may need higher dose if does not resolve, will prescribe with caution given renal function    Plan:  Cbc,cmp, mg phos, vit D level in 1 week  Chemo only in 1 week  RTC 2 week  Start CyBorD - weekly chemotherapy  Continue Bactrim and valtrex prophylaxis  Cbc cmp q weekly   Myeloma labs q 4 weeks     Vitamin D, avoid antacids, diuretics, mag is normal avoid etoh, meat fish poultry and cereal    Pt is instructed to RTC with labs for continued monitoring of treatment as instructed.     Total time spent in counseling and discussion about further management options including relevant lab work, treatment,  prognosis, medications and intended side effects was more than 30 minutes. More  than 50 % of the time was spent in counseling and coordination of care.  This includes face to face time and non-face to face time preparing to see the patient (eg, review of tests), Obtaining and/or reviewing separately obtained history, Documenting clinical information in the electronic or other health record, Independently interpreting resultsand communicating results to the patient/family/caregiver, or Care coordination.

## 2024-10-17 LAB — 25(OH)D3 SERPL-MCNC: 44 NG/ML

## 2024-10-25 ENCOUNTER — OFFICE VISIT (OUTPATIENT)
Dept: HEMATOLOGY/ONCOLOGY | Facility: CLINIC | Age: 54
End: 2024-10-25
Payer: COMMERCIAL

## 2024-10-25 VITALS
WEIGHT: 199.13 LBS | HEIGHT: 71 IN | TEMPERATURE: 98 F | RESPIRATION RATE: 16 BRPM | SYSTOLIC BLOOD PRESSURE: 129 MMHG | OXYGEN SATURATION: 94 % | DIASTOLIC BLOOD PRESSURE: 76 MMHG | BODY MASS INDEX: 27.88 KG/M2 | HEART RATE: 63 BPM

## 2024-10-25 DIAGNOSIS — C90.00 MULTIPLE MYELOMA NOT HAVING ACHIEVED REMISSION: Primary | ICD-10-CM

## 2024-10-25 DIAGNOSIS — T45.1X5A IMMUNODEFICIENCY DUE TO CHEMOTHERAPY: ICD-10-CM

## 2024-10-25 DIAGNOSIS — D84.821 IMMUNODEFICIENCY DUE TO CHEMOTHERAPY: ICD-10-CM

## 2024-10-25 DIAGNOSIS — Z79.899 IMMUNODEFICIENCY DUE TO CHEMOTHERAPY: ICD-10-CM

## 2024-10-25 DIAGNOSIS — R25.2 MUSCLE CRAMPS: ICD-10-CM

## 2024-10-25 DIAGNOSIS — N17.9 ACUTE RENAL FAILURE, UNSPECIFIED ACUTE RENAL FAILURE TYPE: ICD-10-CM

## 2024-10-25 PROCEDURE — 3074F SYST BP LT 130 MM HG: CPT | Mod: CPTII,,, | Performed by: NURSE PRACTITIONER

## 2024-10-25 PROCEDURE — 3078F DIAST BP <80 MM HG: CPT | Mod: CPTII,,, | Performed by: NURSE PRACTITIONER

## 2024-10-25 PROCEDURE — G2211 COMPLEX E/M VISIT ADD ON: HCPCS | Mod: S$PBB,,, | Performed by: NURSE PRACTITIONER

## 2024-10-25 PROCEDURE — 99214 OFFICE O/P EST MOD 30 MIN: CPT | Mod: S$PBB,,, | Performed by: NURSE PRACTITIONER

## 2024-10-25 PROCEDURE — 1159F MED LIST DOCD IN RCRD: CPT | Mod: CPTII,,, | Performed by: NURSE PRACTITIONER

## 2024-10-25 PROCEDURE — 3008F BODY MASS INDEX DOCD: CPT | Mod: CPTII,,, | Performed by: NURSE PRACTITIONER

## 2024-10-25 RX ORDER — BORTEZOMIB 3.5 MG/1
1.5 INJECTION, POWDER, LYOPHILIZED, FOR SOLUTION INTRAVENOUS; SUBCUTANEOUS
Status: CANCELLED | OUTPATIENT
Start: 2024-10-25

## 2024-10-25 RX ORDER — BORTEZOMIB 3.5 MG/1
1.5 INJECTION, POWDER, LYOPHILIZED, FOR SOLUTION INTRAVENOUS; SUBCUTANEOUS
OUTPATIENT
Start: 2024-11-01

## 2024-10-25 RX ORDER — HEPARIN 100 UNIT/ML
500 SYRINGE INTRAVENOUS
OUTPATIENT
Start: 2024-11-15

## 2024-10-25 RX ORDER — BORTEZOMIB 3.5 MG/1
1.5 INJECTION, POWDER, LYOPHILIZED, FOR SOLUTION INTRAVENOUS; SUBCUTANEOUS
OUTPATIENT
Start: 2024-11-08

## 2024-10-25 RX ORDER — SODIUM CHLORIDE 0.9 % (FLUSH) 0.9 %
10 SYRINGE (ML) INJECTION
OUTPATIENT
Start: 2024-11-15

## 2024-10-25 RX ORDER — HEPARIN 100 UNIT/ML
500 SYRINGE INTRAVENOUS
OUTPATIENT
Start: 2024-11-01

## 2024-10-25 RX ORDER — PALONOSETRON 0.05 MG/ML
0.25 INJECTION, SOLUTION INTRAVENOUS
Status: CANCELLED | OUTPATIENT
Start: 2024-10-25

## 2024-10-25 RX ORDER — HEPARIN 100 UNIT/ML
500 SYRINGE INTRAVENOUS
OUTPATIENT
Start: 2024-11-08

## 2024-10-25 RX ORDER — HEPARIN 100 UNIT/ML
500 SYRINGE INTRAVENOUS
Status: CANCELLED | OUTPATIENT
Start: 2024-10-25

## 2024-10-25 RX ORDER — BACLOFEN 10 MG/1
10 TABLET ORAL 3 TIMES DAILY
Qty: 90 TABLET | Refills: 11 | Status: SHIPPED | OUTPATIENT
Start: 2024-10-25 | End: 2025-10-25

## 2024-10-25 RX ORDER — PALONOSETRON 0.05 MG/ML
0.25 INJECTION, SOLUTION INTRAVENOUS
OUTPATIENT
Start: 2024-11-08

## 2024-10-25 RX ORDER — SODIUM CHLORIDE 0.9 % (FLUSH) 0.9 %
10 SYRINGE (ML) INJECTION
OUTPATIENT
Start: 2024-11-01

## 2024-10-25 RX ORDER — SODIUM CHLORIDE 0.9 % (FLUSH) 0.9 %
10 SYRINGE (ML) INJECTION
Status: CANCELLED | OUTPATIENT
Start: 2024-10-25

## 2024-10-25 RX ORDER — BORTEZOMIB 3.5 MG/1
1.5 INJECTION, POWDER, LYOPHILIZED, FOR SOLUTION INTRAVENOUS; SUBCUTANEOUS
OUTPATIENT
Start: 2024-11-15

## 2024-10-25 RX ORDER — PALONOSETRON 0.05 MG/ML
0.25 INJECTION, SOLUTION INTRAVENOUS
OUTPATIENT
Start: 2024-11-01

## 2024-10-25 RX ORDER — PALONOSETRON 0.05 MG/ML
0.25 INJECTION, SOLUTION INTRAVENOUS
OUTPATIENT
Start: 2024-11-15

## 2024-10-25 RX ORDER — SODIUM CHLORIDE 0.9 % (FLUSH) 0.9 %
10 SYRINGE (ML) INJECTION
OUTPATIENT
Start: 2024-11-08

## 2024-10-28 PROBLEM — N17.9 ACUTE RENAL FAILURE: Status: RESOLVED | Noted: 2024-07-25 | Resolved: 2024-10-28

## 2024-11-01 ENCOUNTER — OFFICE VISIT (OUTPATIENT)
Dept: HEMATOLOGY/ONCOLOGY | Facility: CLINIC | Age: 54
End: 2024-11-01
Payer: COMMERCIAL

## 2024-11-01 VITALS
HEIGHT: 71 IN | SYSTOLIC BLOOD PRESSURE: 126 MMHG | DIASTOLIC BLOOD PRESSURE: 75 MMHG | TEMPERATURE: 98 F | HEART RATE: 62 BPM | RESPIRATION RATE: 16 BRPM | WEIGHT: 197.31 LBS | OXYGEN SATURATION: 98 % | BODY MASS INDEX: 27.62 KG/M2

## 2024-11-01 DIAGNOSIS — T45.1X5A IMMUNODEFICIENCY DUE TO CHEMOTHERAPY: ICD-10-CM

## 2024-11-01 DIAGNOSIS — C90.00 MULTIPLE MYELOMA NOT HAVING ACHIEVED REMISSION: Primary | ICD-10-CM

## 2024-11-01 DIAGNOSIS — D84.821 IMMUNODEFICIENCY DUE TO CHEMOTHERAPY: ICD-10-CM

## 2024-11-01 DIAGNOSIS — N17.9 ACUTE RENAL FAILURE, UNSPECIFIED ACUTE RENAL FAILURE TYPE: ICD-10-CM

## 2024-11-01 DIAGNOSIS — R25.2 MUSCLE CRAMPS: ICD-10-CM

## 2024-11-01 DIAGNOSIS — E83.39 HYPOPHOSPHATEMIA: ICD-10-CM

## 2024-11-01 DIAGNOSIS — Z79.899 IMMUNODEFICIENCY DUE TO CHEMOTHERAPY: ICD-10-CM

## 2024-11-01 LAB — 25(OH)D3 SERPL-MCNC: 41 NG/ML

## 2024-11-08 ENCOUNTER — OFFICE VISIT (OUTPATIENT)
Dept: HEMATOLOGY/ONCOLOGY | Facility: CLINIC | Age: 54
End: 2024-11-08
Payer: COMMERCIAL

## 2024-11-08 VITALS
HEART RATE: 73 BPM | SYSTOLIC BLOOD PRESSURE: 133 MMHG | DIASTOLIC BLOOD PRESSURE: 72 MMHG | RESPIRATION RATE: 16 BRPM | TEMPERATURE: 98 F | WEIGHT: 197.81 LBS | HEIGHT: 71 IN | OXYGEN SATURATION: 95 % | BODY MASS INDEX: 27.69 KG/M2

## 2024-11-08 DIAGNOSIS — T45.1X5A IMMUNODEFICIENCY DUE TO CHEMOTHERAPY: ICD-10-CM

## 2024-11-08 DIAGNOSIS — Z79.899 IMMUNODEFICIENCY DUE TO CHEMOTHERAPY: ICD-10-CM

## 2024-11-08 DIAGNOSIS — N17.9 ACUTE RENAL FAILURE, UNSPECIFIED ACUTE RENAL FAILURE TYPE: ICD-10-CM

## 2024-11-08 DIAGNOSIS — E83.39 HYPOPHOSPHATEMIA: ICD-10-CM

## 2024-11-08 DIAGNOSIS — C90.00 MULTIPLE MYELOMA NOT HAVING ACHIEVED REMISSION: Primary | ICD-10-CM

## 2024-11-08 DIAGNOSIS — R25.2 MUSCLE CRAMPS: ICD-10-CM

## 2024-11-08 DIAGNOSIS — D84.821 IMMUNODEFICIENCY DUE TO CHEMOTHERAPY: ICD-10-CM

## 2024-11-08 RX ORDER — BORTEZOMIB 3.5 MG/1
1.5 INJECTION, POWDER, LYOPHILIZED, FOR SOLUTION INTRAVENOUS; SUBCUTANEOUS
OUTPATIENT
Start: 2024-11-22

## 2024-11-08 RX ORDER — SODIUM CHLORIDE 0.9 % (FLUSH) 0.9 %
10 SYRINGE (ML) INJECTION
OUTPATIENT
Start: 2024-11-22

## 2024-11-08 RX ORDER — HEPARIN 100 UNIT/ML
500 SYRINGE INTRAVENOUS
OUTPATIENT
Start: 2024-11-22

## 2024-11-08 RX ORDER — PALONOSETRON 0.05 MG/ML
0.25 INJECTION, SOLUTION INTRAVENOUS
OUTPATIENT
Start: 2024-11-22

## 2024-11-08 NOTE — PROGRESS NOTES
HEMATOLOGY FOLLOW UP CONSULTATION NOTE    Patient ID: Miky Carrasco is a 54 y.o. male.    Chief Complaint:  Multiple myeloma    HPI:  Patient is a 53-year-old male with past medical history of hyperlipidemia who recently was evaluated by Nephrology for worsening kidney function and underwent further lab work including serum protein electrophoresis which showed findings concerning for possible MGUS versus active multiple myeloma with decline in kidney.  Patient presents to our clinic today for further evaluation.  Voices no acute complaints. Denies bone pain, activity and appetite changes.              No past medical history on file.    Family History   Problem Relation Name Age of Onset    Hypertension Mother      Diabetes Mother      Skin cancer Mother      Hypertension Father      Cancer Sister         Social History     Socioeconomic History    Marital status:    Tobacco Use    Smoking status: Every Day     Passive exposure: Current    Smokeless tobacco: Current     Types: Chew   Substance and Sexual Activity    Alcohol use: Not Currently    Drug use: Not Currently     Types: Marijuana    Sexual activity: Yes     Social Drivers of Health     Financial Resource Strain: Low Risk  (8/14/2024)    Overall Financial Resource Strain (CARDIA)     Difficulty of Paying Living Expenses: Not very hard   Food Insecurity: Unknown (8/14/2024)    Hunger Vital Sign     Worried About Running Out of Food in the Last Year: Never true   Physical Activity: Insufficiently Active (8/14/2024)    Exercise Vital Sign     Days of Exercise per Week: 2 days     Minutes of Exercise per Session: 30 min   Stress: Stress Concern Present (8/14/2024)    [employer] - Occupational Stress Questionnaire     Feeling of Stress : To some extent   Housing Stability: Unknown (8/14/2024)    Housing Stability Vital Sign     Unable to Pay for Housing in the Last Year: Patient declined         No past surgical history on  file.      Review of systems:  Review of Systems   Constitutional:  Negative for activity change, appetite change, chills, diaphoresis, fatigue and unexpected weight change.   HENT:  Negative for congestion, facial swelling, hearing loss, mouth sores, trouble swallowing and voice change.    Eyes:  Negative for photophobia, pain, discharge and itching.   Respiratory:  Negative for apnea, cough, choking, chest tightness and shortness of breath.    Cardiovascular:  Negative for chest pain, palpitations and leg swelling.   Gastrointestinal:  Negative for abdominal distention, abdominal pain, anal bleeding and blood in stool.   Endocrine: Negative for cold intolerance, heat intolerance, polydipsia and polyphagia.   Genitourinary:  Negative for difficulty urinating, dysuria, flank pain and hematuria.   Musculoskeletal:  Negative for arthralgias, back pain, joint swelling, myalgias, neck pain and neck stiffness.   Skin:  Negative for color change, pallor and wound.   Allergic/Immunologic: Negative for environmental allergies, food allergies and immunocompromised state.   Neurological:  Negative for dizziness, seizures, facial asymmetry, speech difficulty, light-headedness, numbness and headaches.   Hematological:  Negative for adenopathy. Does not bruise/bleed easily.   Psychiatric/Behavioral:  Negative for agitation, behavioral problems, confusion, decreased concentration and sleep disturbance.                                Physical Exam  Vitals and nursing note reviewed.   Constitutional:       General: He is not in acute distress.     Appearance: Normal appearance. He is not ill-appearing.   HENT:      Head: Normocephalic and atraumatic.      Nose: No congestion or rhinorrhea.   Eyes:      General: No scleral icterus.     Extraocular Movements: Extraocular movements intact.      Pupils: Pupils are equal, round, and reactive to light.   Cardiovascular:      Rate and Rhythm: Normal rate and regular rhythm.      Pulses:  Normal pulses.      Heart sounds: Normal heart sounds. No murmur heard.     No gallop.   Pulmonary:      Effort: Pulmonary effort is normal. No respiratory distress.      Breath sounds: Normal breath sounds. No stridor. No wheezing or rhonchi.   Abdominal:      General: Bowel sounds are normal. There is no distension.      Palpations: There is no mass.      Tenderness: There is no abdominal tenderness. There is no guarding.   Musculoskeletal:         General: No swelling, tenderness, deformity or signs of injury. Normal range of motion.      Cervical back: Normal range of motion and neck supple. No rigidity. No muscular tenderness.      Right lower leg: No edema.      Left lower leg: No edema.   Skin:     General: Skin is warm.      Coloration: Skin is not jaundiced or pale.      Findings: No bruising or lesion.   Neurological:      General: No focal deficit present.      Mental Status: He is alert and oriented to person, place, and time.      Cranial Nerves: No cranial nerve deficit.      Sensory: No sensory deficit.      Motor: No weakness.      Gait: Gait normal.   Psychiatric:         Mood and Affect: Mood normal.         Behavior: Behavior normal.         Thought Content: Thought content normal.     There were no vitals filed for this visit.  There is no height or weight on file to calculate BSA.    Assessment/Plan:      ECOG 1    Multiple myeloma:    == reviewed prior lab work done with Nephrology.  Patient is noted to have worsening renal failure and also has presence of monoclonal band on immuno fixation studies.  I will initiate workup for multiple myeloma and will include a bone marrow biopsy as well as a PET scan to rule out any osteolytic lesions.  Discussed these findings in detail with patient giving him opportunity to ask questions  == 8/18/24:  Bone marrow biopsy done showed increased number of plasma cells.  No percentage mentioned but reported to be more than normal and in the smoldering range.   With evidence of worsening renal failure along with elevated kappa free light chains and kappa/lambda free light chain ratio findings are consistent with active multiple myeloma.  Due to decline in kidney function my initial plan would be to initiate CyBorD.  Patient reports that he is on a job which requires lot of traveling and unable to come to clinics  regularly do receive his treatment.   ==9/23/24:  Patient and wife here today to discuss upcoming chemotherapy/immunotherapy. An extensive discussion was had which included a thorough discussion of potential side effect profile, risk versus benefits, and expected outcomes.  Risks, including but not limited to, possible hair loss, bone marrow damage, damage to body organs, allergic reactions, sterility, nausea/vomiting, constipation/diarrhea, sores in the mouth, secondary cancers, and rarely death were all discuss.  Specific side effects pertaining to their chemotherapy/immunotherapy medications were discussed as well.  The patient agrees with the plan and all questions and their support systems questions have been answered to their satisfaction.  Premedications were prescribed and patient was educated on appropriate usage.  Patient was educated on when to call, and given the number to call, or to notify the provider including but not limited to:  Persistent nausea and vomiting, dehydration, fever greater than 100.4 lasting over 1 hour induration or isolated feeders greater than 101, rash while on active chemotherapy or immunotherapy, severe pain or new onset pain not controlled by current pain medication regimen.   ==10/04/2024: Tolerating chemotherapy well, pt has leg cramps has had all of his life worse at night, potassium mg normal, discussed medication, pt would like to try otc topicals such as biofreeze or icyhot with lidocaine first and if not helpful will notify clinic and will send our rx.  No other complaints, will continue chemotherapy, plan to see next  week and if continues to tolerate well plan to see every other week.  ==10/11/2024: Magnesium wnl, but hypophosphatemia noted, pt denies diarrhea or antacid use.  Will start gentle supplementation with po multivitamin, discussed with patient his renal function and may need po phosphate if repeat labs show continued low phosphorus, but black box warning in CKD for po phosphate.  Will try multivitamin containing phos as well as increasing dietary intake - reviewed dietary sources of phosphorus.  Will also check vitamin D level, Vit D deficiency can cause/worsen hypophosphatemia and recheck phos level to determine if transient.  Pt is asymptomatic at this time has occasional leg cramps for all of his life, no increase or other symptoms.  Labs reviewed will continue chemotherapy   ==10/31/2024: Phos wnl, tolerating Cybor D well, leg cramps improved with baclofen, labs reviewed patient is cleared for chemotherapy  ==11/8/2024: Tolerating chemotherapy well, no new complaints. Pt is going out of town on 11/22/2024, plan to see patient next week for visit, then on 11/22 he will have labs and chemo only in the am.       2. Prophylaxis for infection prevention  Bactrim an acyclovir sent to pharmacy  Advised to get Flu shot today     3. Muscle cramps to lower extremities  Will check phos and mag level    4. Hypophosphatemia  ==Asymptomatic, normal magnesium level - pt has had leg cramps all of life, no increase previous phos levels wnl  ==Recheck to determine if transient, avoid antacids, check vitamin D level  ==encourage po intake, will start multivitamin containing phosphorus, may need higher dose if does not resolve, will prescribe with caution given renal function    Plan:  Cbc,cmp, mg phos, vit D level in 1 week  Chemo only in 2 week  RTC 1 week  Start CyBorD - weekly chemotherapy  Continue Bactrim and valtrex prophylaxis  Cbc cmp q weekly   Myeloma labs q 4 weeks - due with 11/15 labs if not already drawn        Pt is  instructed to RTC with labs for continued monitoring of treatment as instructed.     Total time spent in counseling and discussion about further management options including relevant lab work, treatment,  prognosis, medications and intended side effects was more than 30 minutes. More than 50 % of the time was spent in counseling and coordination of care.  This includes face to face time and non-face to face time preparing to see the patient (eg, review of tests), Obtaining and/or reviewing separately obtained history, Documenting clinical information in the electronic or other health record, Independently interpreting resultsand communicating results to the patient/family/caregiver, or Care coordination.

## 2024-11-22 ENCOUNTER — OFFICE VISIT (OUTPATIENT)
Dept: HEMATOLOGY/ONCOLOGY | Facility: CLINIC | Age: 54
End: 2024-11-22
Payer: COMMERCIAL

## 2024-11-22 VITALS
HEART RATE: 59 BPM | WEIGHT: 201 LBS | HEIGHT: 71 IN | DIASTOLIC BLOOD PRESSURE: 82 MMHG | RESPIRATION RATE: 16 BRPM | SYSTOLIC BLOOD PRESSURE: 129 MMHG | OXYGEN SATURATION: 94 % | BODY MASS INDEX: 28.14 KG/M2

## 2024-11-22 DIAGNOSIS — C90.00 MULTIPLE MYELOMA NOT HAVING ACHIEVED REMISSION: Primary | ICD-10-CM

## 2024-11-22 RX ORDER — SODIUM CHLORIDE 0.9 % (FLUSH) 0.9 %
10 SYRINGE (ML) INJECTION
OUTPATIENT
Start: 2024-11-29

## 2024-11-22 RX ORDER — BORTEZOMIB 3.5 MG/1
1.5 INJECTION, POWDER, LYOPHILIZED, FOR SOLUTION INTRAVENOUS; SUBCUTANEOUS
OUTPATIENT
Start: 2024-12-13

## 2024-11-22 RX ORDER — PALONOSETRON 0.05 MG/ML
0.25 INJECTION, SOLUTION INTRAVENOUS
OUTPATIENT
Start: 2024-12-06

## 2024-11-22 RX ORDER — HEPARIN 100 UNIT/ML
500 SYRINGE INTRAVENOUS
OUTPATIENT
Start: 2024-11-29

## 2024-11-22 RX ORDER — SODIUM CHLORIDE 0.9 % (FLUSH) 0.9 %
10 SYRINGE (ML) INJECTION
OUTPATIENT
Start: 2024-12-06

## 2024-11-22 RX ORDER — PALONOSETRON 0.05 MG/ML
0.25 INJECTION, SOLUTION INTRAVENOUS
OUTPATIENT
Start: 2024-11-29

## 2024-11-22 RX ORDER — HEPARIN 100 UNIT/ML
500 SYRINGE INTRAVENOUS
OUTPATIENT
Start: 2024-12-06

## 2024-11-22 RX ORDER — BORTEZOMIB 3.5 MG/1
1.5 INJECTION, POWDER, LYOPHILIZED, FOR SOLUTION INTRAVENOUS; SUBCUTANEOUS
OUTPATIENT
Start: 2024-11-29

## 2024-11-22 RX ORDER — BORTEZOMIB 3.5 MG/1
1.5 INJECTION, POWDER, LYOPHILIZED, FOR SOLUTION INTRAVENOUS; SUBCUTANEOUS
OUTPATIENT
Start: 2024-12-06

## 2024-11-22 RX ORDER — HEPARIN 100 UNIT/ML
500 SYRINGE INTRAVENOUS
OUTPATIENT
Start: 2024-12-13

## 2024-11-22 RX ORDER — SODIUM CHLORIDE 0.9 % (FLUSH) 0.9 %
10 SYRINGE (ML) INJECTION
OUTPATIENT
Start: 2024-12-13

## 2024-11-22 RX ORDER — PALONOSETRON 0.05 MG/ML
0.25 INJECTION, SOLUTION INTRAVENOUS
OUTPATIENT
Start: 2024-12-13

## 2024-12-02 ENCOUNTER — TELEPHONE (OUTPATIENT)
Dept: HEMATOLOGY/ONCOLOGY | Facility: CLINIC | Age: 54
End: 2024-12-02
Payer: COMMERCIAL

## 2024-12-02 NOTE — TELEPHONE ENCOUNTER
----- Message from Sara De León NP sent at 12/2/2024  2:30 PM CST -----  Contact: Patient's Wife  Not locally.  [facility] offices are completely closed on Fridays, their chemobays are selectively open 1/2 day on Fridays. I have no idea if [facility] could accommodate him but she could call and ask  ----- Message -----  From: Doyle Paul RN  Sent: 12/2/2024   2:29 PM CST  To: Sara De León NP      ----- Message -----  From: Amairani Rodriges  Sent: 12/2/2024   2:18 PM CST  To: Sarthak Ruiz Staff    Patient's wife is calling in regards to she says that the patient is working in [place] and she will like to know is there  place there that he can receive his treatments on Friday evenings. Call Back is [phone]

## 2024-12-03 ENCOUNTER — TELEPHONE (OUTPATIENT)
Dept: HEMATOLOGY/ONCOLOGY | Facility: CLINIC | Age: 54
End: 2024-12-03
Payer: COMMERCIAL

## 2024-12-03 NOTE — TELEPHONE ENCOUNTER
Oncology Navigation   Intake  Cancer Type: Myeloma  Type of Referral: External  Date of Referral: 11/27/24  Initial Nurse Navigator Contact: 12/03/24  Referral to Initial Contact Timeline (days): 6  First Appointment Available: 12/12/24  Appointment Date: 12/12/24  First Available Date vs. Scheduled Date (days): 0     Treatment                              Acuity      Follow Up  No follow-ups on file.

## 2024-12-03 NOTE — TELEPHONE ENCOUNTER
Called and spoke to Patient .  Appointment scheduled on 12/12 with Dr Meredith.  All questions and concerns addressed.   Provided my name and direct number and instructed Patient  to call with any questions and/or concerns.       SWETHA VargasN, RN-BC  BMT Nurse Navigator  [facility] [address], [place], [place] [address]  _________________________  o [phone]

## 2024-12-03 NOTE — TELEPHONE ENCOUNTER
----- Message from Robin Walt sent at 12/2/2024 12:37 PM CST -----  Regarding: RE: Bone Marrow Transplant Referral  I have Verified medical and transplant benefit for patient for [facility].    Walt  ----- Message -----  From: Douglas Heredia, RN  Sent: 12/2/2024  12:04 PM CST  To: Roibn Walt  Subject: FW: Bone Marrow Transplant Referral              Please verify car t tx benefits  ----- Message -----  From: Hoda Norman, MALLIKA  Sent: 11/27/2024  10:10 AM CST  To: Douglas Heredia, RN  Subject: FW: Bone Marrow Transplant Referral              Donato Cole,    This referral came to me but its BMT.    Hoda Casanova  ----- Message -----  From: Nydia Sheehan, RN  Sent: 11/26/2024   1:48 PM CST  To: [facility] Oncology Navigation  Subject: Bone Marrow Transplant Referral                  Referral to bone marrow transplant team in place. Please contact Mr. Carrasco to schedule.     Thank you!     Nydia Sheehan, RN, BSN  RN Oncology Navigator   [facility]  [facility] [address] [place] [address]  [email or web address]  (P) [phone]  (F) [phone]

## 2024-12-10 ENCOUNTER — PATIENT MESSAGE (OUTPATIENT)
Dept: ADMINISTRATIVE | Facility: OTHER | Age: 54
End: 2024-12-10
Payer: COMMERCIAL

## 2024-12-11 ENCOUNTER — LAB VISIT (OUTPATIENT)
Dept: LAB | Facility: HOSPITAL | Age: 54
End: 2024-12-11
Attending: INTERNAL MEDICINE
Payer: COMMERCIAL

## 2024-12-11 DIAGNOSIS — C90.00 MULTIPLE MYELOMA NOT HAVING ACHIEVED REMISSION: Primary | ICD-10-CM

## 2024-12-11 DIAGNOSIS — C90.00 MULTIPLE MYELOMA NOT HAVING ACHIEVED REMISSION: ICD-10-CM

## 2024-12-11 PROCEDURE — 88364 INSITU HYBRIDIZATION (FISH): CPT | Mod: 59 | Performed by: PATHOLOGY

## 2024-12-11 PROCEDURE — 88365 INSITU HYBRIDIZATION (FISH): CPT | Performed by: PATHOLOGY

## 2024-12-11 PROCEDURE — 88325 CONSLTJ COMPRE RVW REC REPRT: CPT | Performed by: PATHOLOGY

## 2024-12-11 PROCEDURE — 88313 SPECIAL STAINS GROUP 2: CPT | Performed by: PATHOLOGY

## 2024-12-12 ENCOUNTER — OFFICE VISIT (OUTPATIENT)
Dept: HEMATOLOGY/ONCOLOGY | Facility: CLINIC | Age: 54
End: 2024-12-12
Payer: COMMERCIAL

## 2024-12-12 VITALS
WEIGHT: 203.06 LBS | HEIGHT: 71 IN | BODY MASS INDEX: 28.43 KG/M2 | OXYGEN SATURATION: 96 % | RESPIRATION RATE: 18 BRPM | HEART RATE: 68 BPM | DIASTOLIC BLOOD PRESSURE: 66 MMHG | SYSTOLIC BLOOD PRESSURE: 123 MMHG

## 2024-12-12 DIAGNOSIS — C90.00 MULTIPLE MYELOMA NOT HAVING ACHIEVED REMISSION: Primary | ICD-10-CM

## 2024-12-12 DIAGNOSIS — N18.32 STAGE 3B CHRONIC KIDNEY DISEASE: ICD-10-CM

## 2024-12-12 PROCEDURE — 1160F RVW MEDS BY RX/DR IN RCRD: CPT | Mod: CPTII,S$GLB,, | Performed by: INTERNAL MEDICINE

## 2024-12-12 PROCEDURE — 99999 PR PBB SHADOW E&M-EST. PATIENT-LVL IV: CPT | Mod: PBBFAC,,, | Performed by: INTERNAL MEDICINE

## 2024-12-12 PROCEDURE — 3078F DIAST BP <80 MM HG: CPT | Mod: CPTII,S$GLB,, | Performed by: INTERNAL MEDICINE

## 2024-12-12 PROCEDURE — 99205 OFFICE O/P NEW HI 60 MIN: CPT | Mod: S$GLB,,, | Performed by: INTERNAL MEDICINE

## 2024-12-12 PROCEDURE — 3008F BODY MASS INDEX DOCD: CPT | Mod: CPTII,S$GLB,, | Performed by: INTERNAL MEDICINE

## 2024-12-12 PROCEDURE — 1159F MED LIST DOCD IN RCRD: CPT | Mod: CPTII,S$GLB,, | Performed by: INTERNAL MEDICINE

## 2024-12-12 PROCEDURE — 99417 PROLNG OP E/M EACH 15 MIN: CPT | Mod: S$GLB,,, | Performed by: INTERNAL MEDICINE

## 2024-12-12 PROCEDURE — 3074F SYST BP LT 130 MM HG: CPT | Mod: CPTII,S$GLB,, | Performed by: INTERNAL MEDICINE

## 2024-12-12 NOTE — PROGRESS NOTES
HEMATOLOGIC MALIGNANCIES CONSULT NOTE    IDENTIFYING STATEMENT   Miky Cheng) is a 54 y.o. male with a  of 1970 from [place], [place], referred by El Saunders for evaluation of Multiple Myeloma.     HISTORY OF PRESENT ILLNESS:    Referred to Dr. Saunders in 2024 for possible MGUS vs myeloma in setting of worsening renal function. Bone marrow biopsy 24 showed hypocellular marrow with increased plasma cells (15-20%)     Baseline labs 2024  kappa lc 698.20  Lambda lc 8.10  FLCR 86.2  IgG 881, IgA 61, IgM 24    B2M 3.6  Cr 1.95  Ca 9.2  Hgb 17.6    Labs 11/15/24   .04  LLC 7.44  FLCR 46.65  Cr 2.06    Started CyBorD 24      The patient is a 54-year-old male with newly diagnosed kappa light chain multiple myeloma (2024) currently on his third cycle of CyBorD (cyclophosphamide, bortezomib, dexamethasone) induction therapy. He presents to the myeloma clinic for evaluation of autologous hematopoietic stem cell transplant (autoHSCT).  Treatment Tolerance: Reports tolerating CyBorD well overall, with fatigue on the day of treatment but no significant impact on daily functioning. He works full-time as a  and remains active, which is a positive indicator of his functional status.  Referral Rationale: Referred early for transplant consideration due to his strength and good physical condition.  Questions/Concerns: Patient seeks clarification on the transplant process, timeline, and success rates. He expresses motivation to proceed quickly, aiming to improve remission duration and quality of life.  Other Details:  Mild kidney dysfunction (eGFR ~35), noted but manageable with transplant dosing adjustments.  Lives with his wife in [place], with no major home risks identified for post-transplant recovery.  Well-supported at home with plans for a  during treatment.    RELEVANT COMORBID CONDITIONS:    Past Medical History:   Diagnosis Date     Disorder of kidney and ureter     Multiple myeloma not having achieved remission        Family History   Problem Relation Name Age of Onset    Hypertension Mother      Diabetes Mother      Skin cancer Mother      Hypertension Father      Cancer Sister         Social History     Socioeconomic History    Marital status:    Tobacco Use    Smoking status: Every Day     Passive exposure: Current    Smokeless tobacco: Current     Types: Chew   Substance and Sexual Activity    Alcohol use: Not Currently    Drug use: Not Currently     Types: Marijuana    Sexual activity: Yes     Social Drivers of Health     Financial Resource Strain: Low Risk  (8/14/2024)    Overall Financial Resource Strain (CARDIA)     Difficulty of Paying Living Expenses: Not very hard   Food Insecurity: Unknown (8/14/2024)    Hunger Vital Sign     Worried About Running Out of Food in the Last Year: Never true   Physical Activity: Insufficiently Active (8/14/2024)    Exercise Vital Sign     Days of Exercise per Week: 2 days     Minutes of Exercise per Session: 30 min   Stress: Stress Concern Present (8/14/2024)    [employer] [place] [employer] - Occupational Stress Questionnaire     Feeling of Stress : To some extent   Housing Stability: Unknown (8/14/2024)    Housing Stability Vital Sign     Unable to Pay for Housing in the Last Year: Patient declined         MEDICATIONS:     Current Outpatient Medications on File Prior to Visit   Medication Sig Dispense Refill    acyclovir (ZOVIRAX) 400 MG tablet Take 1 tablet (400 mg total) by mouth once daily. 30 tablet 11    cycloPHOSphamide (CYTOXAN) 50 mg capsule Take 12 capsules (600 mg total) by mouth every 7 days on days 1, 8, 15 and 22 of each 28 day cycle. Take with lots of water. with 1 other cycloPHOSphamide prescription for 625 mg total. 48 capsule 5    cycloPHOSphamide 25 mg Cap Take 1 capsule (25 mg total) by mouth every 7 days on days 1, 8, 15 and 22 of each 28 day cycle. Take with  "lots of water. with 1 other cycloPHOSphamide prescription for 625 mg total. 4 capsule 5    dexAMETHasone (DECADRON) 4 MG Tab Take 10 tablets (40 mg total) by mouth every 7 days. on days 1, 8, 15, and 22 of each chemotherapy cycle. Take with food. 40 tablet 5    sulfamethoxazole-trimethoprim 400-80mg (BACTRIM) 400-80 mg per tablet Take 1 tablet by mouth once daily. for 360 doses 30 tablet 11    [DISCONTINUED] baclofen (LIORESAL) 10 MG tablet Take 1 tablet (10 mg total) by mouth 3 (three) times daily. 90 tablet 11     No current facility-administered medications on file prior to visit.       ALLERGIES:   Review of patient's allergies indicates:   Allergen Reactions    Penicillins Rash    Baclofen Other (See Comments)     Can't sit still, causes body to twitch        ROS:       Review of Systems   Constitutional: Negative.    HENT:  Negative.     Eyes: Negative.    Respiratory: Negative.     Cardiovascular: Negative.    Gastrointestinal: Negative.    Genitourinary: Negative.     Musculoskeletal:         Leg cramps   Skin: Negative.    Neurological: Negative.    Hematological: Negative.    Psychiatric/Behavioral: Negative.         PHYSICAL EXAM:  Vitals:    12/12/24 1400   BP: 123/66   Pulse: 68   Resp: 18   SpO2: 96%   Weight: 92.1 kg (203 lb 0.7 oz)   Height: 5' 11" (1.803 m)   PainSc: 0-No pain       Physical Exam  Vitals and nursing note reviewed.   Constitutional:       General: He is not in acute distress.     Appearance: Normal appearance. He is normal weight. He is not toxic-appearing.   HENT:      Head: Normocephalic and atraumatic.   Eyes:      General: No scleral icterus.     Conjunctiva/sclera: Conjunctivae normal.   Cardiovascular:      Rate and Rhythm: Normal rate.   Pulmonary:      Effort: Pulmonary effort is normal. No respiratory distress.   Abdominal:      General: There is no distension.   Musculoskeletal:         General: No deformity.   Skin:     Coloration: Skin is not jaundiced.      Findings: No " erythema.   Neurological:      General: No focal deficit present.      Mental Status: He is alert and oriented to person, place, and time. Mental status is at baseline.   Psychiatric:         Mood and Affect: Mood normal.         Behavior: Behavior normal.         Thought Content: Thought content normal.         LAB:   Results for orders placed or performed in visit on 12/06/24   CBC w/ DIFF    Collection Time: 12/06/24  8:40 AM   Result Value Ref Range    WBC 4.7 4.6 - 10.2 10*3/uL    RBC 4.56 (L) 4.7 - 6.1 10*6/uL    Hemoglobin 14.3 14.0 - 18.0 g/dL    Hematocrit 41.8 (L) 42.0 - 52.0 %    MCV 91.7 80 - 97 fL    MCH 31.4 (H) 27.0 - 31.2 pg    MCHC 34.2 31.8 - 35.4 g/dL    RDW RBC Auto-Rto 13.7 12.5 - 18.0 %    Platelets 197 142 - 424 10*3/uL    Neutrophils 58.8 37 - 80 %    Lymphocytes 27.5 25 - 40 %    Monocytes 10.6 1 - 15 %    Eosinophils 2.1 1 - 3 %    Basophils 0.4 0 - 3 %    nRBC# 0.0 %    Neutrophils Absolute 2.78 1.8 - 7.7 10*3/uL   CMP    Collection Time: 12/06/24  8:40 AM   Result Value Ref Range    Sodium 137 135 - 145 mmol/L    Potassium 3.9 3.6 - 5.2 mmol/L    Chloride 103 100 - 108 mmol/L    CO2 29 21 - 32 mmol/L    Anion Gap 5.0 3.0 - 11.0 mmol/L    BUN 20 (H) 7 - 18 mg/dL    Creatinine 2.20 (H) 0.70 - 1.30 mg/dL    GFR ESTIMATION 35 (L) >60    BUN/Creatinine Ratio 9.09 7 - 18 Ratio    Glucose 157 (H) 70 - 110 mg/dL    Calcium 8.5 (L) 8.8 - 10.5 mg/dL    Total Bilirubin 0.4 0.0 - 1.0 mg/dL    AST 20 15 - 37 U/L    ALT 36 12 - 78 U/L    Total Protein 6.2 (L) 6.4 - 8.2 g/dL    Albumin 3.3 (L) 3.4 - 5.0 g/dL    Globulin 2.9 2.3 - 3.5 g/dL    Albumin/Globulin Ratio 1.137 1.1 - 1.8 Ratio    Alkaline Phosphatase 84 46 - 116 U/L   Magnesium    Collection Time: 12/06/24  8:40 AM   Result Value Ref Range    Magnesium 2.0 1.8 - 2.4 mg/dL   Phosphorus    Collection Time: 12/06/24  8:40 AM   Result Value Ref Range    Phosphorus 1.8 (L) 2.6 - 4.7 mg/dL     CLINICAL DATA:  MGUS.  LEFT ILIUM BONE MARROW:  ADDENDUM  REPORT  CYTOGENETICS  8/12/2024    HYPOCELLULAR BONE MARROW WITH PLASMA CELL DYSCRASIA (SEE COMMENT).  LEFT-SHIFTED AND MILDLY ATYPICAL MYELOID MATURATION PATTERN VIA FLOW CYTOMETRY.  CYTOGENETICS: 46,XY[20] - NORMAL MALE KARYOTYPE.    COMMENT: The clinical history of MGUS is noted, and plasma cells are increased, however they were in insufficient numbers to evaluate clonality.  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  Evaluation of other traditional CRAB features, as well as a repeat of serum protein electrophoresis and molecular studies should be considered.  The atypical myeloid element noted on flow analysis is not appreciated  within the material submitted, which has low (<2%) blasts.      GROSS DESCRIPTION:  SPECIMEN: Bone marrow aspirate with core biopsy for pathologist's review  PROCEDURE: Peripheral smear, aspiration, and biopsy    WBC 9.2  RBC 5.49  HGB 16.4  HCT 49.3  MCV 89.8  MCH 29.9  MCHC 33.3  RDW 12.1  PLATELETS 213  MPV 10.4  NEUT 70  BANDS 0 LYMPHS 25  MONOS 4  EOS 1      MICROSCOPIC DESCRIPTION:  PERIPHERAL SMEAR  RBCs: show a normocytic, normochromic red cell population with normal RDW.  No rouleaux, schistocytes, significant polychromasia, or nucleated red blood cells are seen.  WBCs: normal in number with a normal distribution.  No dysplastic neutrophils, lymphocytosis, monocytosis, or definitive blasts are seen.  Platelets: normal in number and morphology.    ASPIRATE SMEARS (evaluation of 4 [place] stained slides)  CELLULARITY: hypocellular.  M:E ratio: 3:1.  ERYTHROID LINE: adequate in number with normoblastic maturation.  MYELOID LINE: complete maturation, but with a slight tendency toward immaturity, with no increase in blasts.  MEGAKARYOCYTIC LINE: adequate in number and morphology.  LYMPHOID CELLS: not increased, no atypical lymphocytes or aggregates seen.  PLASMA CELLS:  increased.    BONE MARROW TREPHINE BIOPSY and CLOT SECTION  TECHNICAL QUALITY: adequate biopsy, evaluated following decalcification; adequate clot section.  CELLULARITY: 20-30% cellular (hypocellular).  ERYTHROID LINE: adequate in number.  MYELOID LINE: complete maturation with no increase in blasts (<2% blasts).  MEGAKARYOCYTIC LINE: adequate in number and morphology.  LYMPHOID CELLS: not increased, no atypical lymphocytes or atypical aggregates are seen.  PLASMA CELLS: increased (15 to 20%).    CYTOCHEMICAL STAINS  PRUSSIAN BLUE: stainable iron is adequate in the aspirate smear. No ring sideroblasts are noted.  Control slide stained appropriately.    IMMUNOHISTOCHEMICAL STAINS  An immunohistochemical panel is performed on paraffin embedded tissue (core biopsy and clot section) and stains as follows:   (plasma cell marker): positive (4+ of 4+, cytoplasmic in 15 to 20% of cells)  CD34: positive (4+ of 4+, cytoplasmic in <2% of cells)  Control slides stained appropriately.    FLOW CYTOMETRY PERFORMED AND INTERPRETED BY: [employer]    INTERPRETATION: Left-shifted and mildly atypical myeloid maturation pattern.    COMMENT: Flow shows a mildly atypical and left-shifted myeloid maturation pattern.  The findings raise concern for a myeloid neoplasm, although they can also be seen in some reactive/infectious processes, or with drugs such as growth factor therapy.    Correlation with the manual blast count (gold standard) is necessary for confirmation since flow cytometry may over or under estimate the blast count.  There is no flow immunophenotypic evidence of a lymphoproliferative disorder, and plasma cells are  insufficient in number to evaluate for clonality on the plasma cell add on panel.  Please correlate the result with morphological findings, other pertinent laboratory data, and clinical information.  For additional information, please see separate flow  cytometry report.     Specimen Collected:  08/02/24 00:00 CDT Last Resulted: 08/12/24 13:46 CDT     PROBLEMS ASSESSED THIS VISIT:    1. Multiple myeloma not having achieved remission    2. Stage 3b chronic kidney disease        MULTIPLE MYELOMA DISEASE CHARACTERISTICS:    Date of diagnosis: 8/2/24    Baseline serologic characteristics at diagnosis:    - M-protein: n/a g/dl   - JANIS: n/a   - Free light chains:    - Kappa 698.20 mg/dl    - Lambda 8.10 mg/dl    - Kappa/Lambda ratio 86.20    Prognostic Factors at diagnosis:   - Serum albumin: 4.1   - LDH: 180   - Beta-2 microglobulin: 3.6   - Chromosomal abnormalities on FISH: n/a (normal cytogenetic karyotype)    R-ISS Stage: II    PLAN:       Transplant Eligibility  - This patient is eligible for autologous stem cell transplant. We will refer to transplant clinic.     Therapy Plan  - Treatment with other - specify CyBorD  - Cycle length: 28 days  - Schedule of treatment:   - Cytoxan 625mg on days 1, 8, 15, 22   - Bortezomib 2.1mg/m2 on days 1, 8, 15, 22   - Dexamethasone 40mg on days 1, 8, 15, 22    Antimicrobial Prophylaxis  - HSV/VZV: acyclovir  - Bacterial: none  - PJP: TMP/SMX  - Other prophylaxis na    Thromboprophylaxis  -  none (not on IMiD)    Response Assessment:  - Evaluate serologic studies with each cycle (SPEP, JANIS, free light chains)  - Obtain PET/CT for imaging response after 4 cycles  - Obtain Bone marrow biopsy for response assessment after 4 cycles    FOLLOW-UP PLAN:    Multiple Myeloma, kappa light chain, on induction (CyBorD):  Assessment: Responding well to induction therapy with preserved functional status. Preparing for autoHSCT for deeper remission.  Currently on C3; labs prior to C3 suggestive of [place] based on .2-->347.04.  Plan:  Coordinate with transplant specialist (Dr. Cardoso) for evaluation and planning.  Arrange restaging studies, including:  Bone marrow biopsy to confirm remission status after C4,  PET CT after C4  Updated serum and urine myeloma markers prior to transplant  clinic eval.  Continue CyBorD with Dr. Saunders in [place] as planned until transplant timing is confirmed.  Recommend dental evaluation for initiation of antiresorptive therapy (ie Zometa or Xgeva.)    2. Transplant Candidacy:  Assessment: Good candidate for autoHSCT based on functional status, disease control, and strong support system.  Plan:  -discussed average 67 month median PFS following transplant (per determination trial) with standard risk disease but that early relapses as well as much more durable remissions >10 years possible with transplant approach in modern therapy era   -discussed my recommendation for early over late transplant; but that late transplant could be considered given standard risk disease   -discussed approximately 2 week hospitalization during which most pts experience moderate chemotherapy related side effects   -discussed approximate day +100 restaging and likely recommendations for Revlimid maintenance for at least 2 years post transplant  and possibly indefinitely or until intolerance or progression   -discussed need for revaccinations starting at day +180   -discussed need for appt and medication compliance following transplant   -discussed need for full time caretaker through day +30   -patient  and family  expressed understanding and all questions answered;   -I will have transplant coordinator contact patient promptly to discuss logistics and address any remaining questions/concerns.    3. Renal Impairment:  Assessment: Likely myeloma-related kidney dysfunction; no contraindication to melphalan with dose adjustment.  Plan:  Monitor renal function and adjust transplant medications as needed.    4. Post-Transplant Planning:  Assessment: Maintenance therapy with Revlimid (lenalidomide) post-transplant is the standard of care to prolong remission.  Plan:  Educate patient about maintenance therapy expectations and potential for long-term remission.  @Route Chart for  Scheduling    BMT Chart Routing      Follow up with physician 3 months.   Follow up with PATRICIA    Provider visit type    Infusion scheduling note    Injection scheduling note    Labs SPEP, CBC, CMP, immunofixation and free light chains   Scheduling:  Preferred lab:  Lab interval: every 4 weeks     Imaging PET scan   after cycle 4   Pharmacy appointment    Other referrals     Schedule bone marrow biopsy                 Treatment Plan Information   OP CyBorD: CYCLOPHOSPHAMIDE PO + ONCE WEEKLY BORTEZOMIB + DEXAMETHASONE Q4W  El Saunders MD   Associated diagnosis: Multiple myeloma not having achieved remission  Beta-2-microglobulin (mg/L): 3.6, Albumin (g/dL): 4.1, ISS: Stage II, High-risk cytogenetics: Absent, LDH: Normal noted on 8/15/2024   Line of treatment: First Line  Treatment Goal: Curative     Upcoming Treatment Dates - OP CyBorD: CYCLOPHOSPHAMIDE PO + ONCE WEEKLY BORTEZOMIB + DEXAMETHASONE Q4W     12/13/2024       Pre-Medications       palonosetron injection 0.25 mg       Chemotherapy       bortezomib (VELCADE) injection 3.25 mg  12/20/2024       Pre-Medications       palonosetron injection 0.25 mg       Chemotherapy       bortezomib (VELCADE) injection 3.25 mg  12/27/2024       Pre-Medications       palonosetron injection 0.25 mg       Chemotherapy       bortezomib (VELCADE) injection 3.25 mg  1/3/2025       Pre-Medications       palonosetron injection 0.25 mg       Chemotherapy       bortezomib (VELCADE) injection 3.25 mg      Hector Meredith MD  Section of Multiple Myeloma and Plasma Cell Disorders  Malignant Hematology and Cellular Therapy  [facility]     Total time of this visit, including time spent face to face with patient and/or via video/audio, and also in preparing for today's visit for MDM and documentation. (Medical Decision Making, including consideration of possible diagnoses, management options, complex medical record review, review of diagnostic tests and  information, consideration and discussion of significant complications based on comorbidities, and discussion with providers involved with the care of the patient) 90 minutes. Greater than 50% was spent face to face with the patient counseling and coordinating care.

## 2024-12-13 ENCOUNTER — TELEPHONE (OUTPATIENT)
Dept: HEMATOLOGY/ONCOLOGY | Facility: CLINIC | Age: 54
End: 2024-12-13
Payer: COMMERCIAL

## 2024-12-13 DIAGNOSIS — C90.00 MULTIPLE MYELOMA NOT HAVING ACHIEVED REMISSION: Primary | ICD-10-CM

## 2024-12-13 LAB
COMMENT: NORMAL
FINAL PATHOLOGIC DIAGNOSIS: NORMAL
GROSS: NORMAL
Lab: NORMAL
MICROSCOPIC EXAM: NORMAL

## 2024-12-13 NOTE — TELEPHONE ENCOUNTER
Called and spoke to Patient .  Appointment scheduled on 1/16 with Dr Cardoso.  All questions and concerns addressed.   Provided my name and direct number and instructed Patient  to call with any questions and/or concerns.       SWETHA VargasN, RN-BC  BMT Nurse Navigator  [facility] [address], [place], [place] [address]  _________________________  o [phone]

## 2024-12-16 DIAGNOSIS — C90.00 MULTIPLE MYELOMA NOT HAVING ACHIEVED REMISSION: Primary | ICD-10-CM

## 2024-12-17 ENCOUNTER — PATIENT MESSAGE (OUTPATIENT)
Dept: HEMATOLOGY/ONCOLOGY | Facility: CLINIC | Age: 54
End: 2024-12-17
Payer: COMMERCIAL

## 2024-12-20 ENCOUNTER — OUTSIDE PLACE OF SERVICE (OUTPATIENT)
Dept: INTERVENTIONAL RADIOLOGY/VASCULAR | Facility: CLINIC | Age: 54
End: 2024-12-20
Payer: COMMERCIAL

## 2024-12-20 DIAGNOSIS — C90.00 MULTIPLE MYELOMA NOT HAVING ACHIEVED REMISSION: Primary | ICD-10-CM

## 2024-12-20 LAB
ABSOLUTE EOSINOPHIL: 0.1 10*3/UL (ref 0–0.6)
ABSOLUTE LYMPHOCYTE: 0.9 10*3/UL (ref 1.2–4)
ABSOLUTE MONOCYTE: 0.4 10*3/UL (ref 0.1–0.8)
ANISOCYTOSIS: ABNORMAL
APTT PPP: 33 SEC (ref 26–38.7)
CELLS COUNTED: 100
EOSINOPHIL NFR BLD: 2 % (ref 0–6)
ERYTHROCYTE [DISTWIDTH] IN BLOOD BY AUTOMATED COUNT: 13.6 % (ref 0–15.5)
HCT VFR BLD AUTO: 41.4 % (ref 42–52)
HGB BLD-MCNC: 14.1 G/DL (ref 14–18)
INR PPP: 1 INR (ref 0.9–1.1)
LYMPHOCYTES NFR BLD: 17 % (ref 20–45)
MCH RBC QN AUTO: 31.7 PG (ref 27–32)
MCHC RBC AUTO-ENTMCNC: 34.1 % (ref 32–36)
MCV RBC AUTO: 93 FL (ref 80–97)
MONOCYTES NFR BLD: 7 % (ref 2–10)
MYELOCYTE ABSOLUTE COUNT: 0.1 10*3/UL (ref 0–0.03)
MYELOCYTES: 1 % (ref 0–1)
NEUTROPHILS # BLD AUTO: 3.9 10*3/UL (ref 1.8–7.7)
NEUTROPHILS NFR BLD: 71 % (ref 50–80)
NUCLEATED RBC-MANUAL: 0 /100 WBC
PLATELET MORPHOLOGY: NORMAL
PLATELETS: 188 10*3/UL (ref 130–400)
PMV BLD AUTO: 10.5 FL (ref 9.2–12.2)
PROTHROMBIN TIME: 10.8 SEC (ref 10.2–12.9)
RBC # BLD AUTO: 4.45 10*6/UL (ref 4.7–6.1)
REACTIVE LYMPHOCYTES: 2 % (ref 0–5)
SMALL PLATELETS BLD QL SMEAR: NORMAL
WBC # BLD: 5.5 10*3/UL (ref 4.5–10)

## 2024-12-27 ENCOUNTER — OFFICE VISIT (OUTPATIENT)
Dept: HEMATOLOGY/ONCOLOGY | Facility: CLINIC | Age: 54
End: 2024-12-27
Payer: COMMERCIAL

## 2024-12-27 VITALS
BODY MASS INDEX: 27.95 KG/M2 | WEIGHT: 199.63 LBS | RESPIRATION RATE: 16 BRPM | HEIGHT: 71 IN | SYSTOLIC BLOOD PRESSURE: 154 MMHG | DIASTOLIC BLOOD PRESSURE: 87 MMHG | TEMPERATURE: 98 F | OXYGEN SATURATION: 95 % | HEART RATE: 70 BPM

## 2024-12-27 DIAGNOSIS — N18.32 STAGE 3B CHRONIC KIDNEY DISEASE: ICD-10-CM

## 2024-12-27 DIAGNOSIS — Z79.899 IMMUNODEFICIENCY DUE TO CHEMOTHERAPY: ICD-10-CM

## 2024-12-27 DIAGNOSIS — R25.2 MUSCLE CRAMPS: ICD-10-CM

## 2024-12-27 DIAGNOSIS — T45.1X5A IMMUNODEFICIENCY DUE TO CHEMOTHERAPY: ICD-10-CM

## 2024-12-27 DIAGNOSIS — C90.00 MULTIPLE MYELOMA NOT HAVING ACHIEVED REMISSION: Primary | ICD-10-CM

## 2024-12-27 DIAGNOSIS — D84.821 IMMUNODEFICIENCY DUE TO CHEMOTHERAPY: ICD-10-CM

## 2024-12-27 DIAGNOSIS — N17.9 ACUTE RENAL FAILURE, UNSPECIFIED ACUTE RENAL FAILURE TYPE: ICD-10-CM

## 2024-12-27 DIAGNOSIS — E83.39 HYPOPHOSPHATEMIA: ICD-10-CM

## 2024-12-27 RX ORDER — BORTEZOMIB 3.5 MG/1
1.5 INJECTION, POWDER, LYOPHILIZED, FOR SOLUTION INTRAVENOUS; SUBCUTANEOUS
OUTPATIENT
Start: 2024-12-27

## 2024-12-27 RX ORDER — PALONOSETRON 0.05 MG/ML
0.25 INJECTION, SOLUTION INTRAVENOUS
OUTPATIENT
Start: 2025-01-17

## 2024-12-27 RX ORDER — SODIUM CHLORIDE 0.9 % (FLUSH) 0.9 %
10 SYRINGE (ML) INJECTION
OUTPATIENT
Start: 2025-01-17

## 2024-12-27 RX ORDER — SODIUM CHLORIDE 0.9 % (FLUSH) 0.9 %
10 SYRINGE (ML) INJECTION
OUTPATIENT
Start: 2025-01-10

## 2024-12-27 RX ORDER — SODIUM CHLORIDE 0.9 % (FLUSH) 0.9 %
10 SYRINGE (ML) INJECTION
OUTPATIENT
Start: 2025-01-03

## 2024-12-27 RX ORDER — SODIUM CHLORIDE 0.9 % (FLUSH) 0.9 %
10 SYRINGE (ML) INJECTION
OUTPATIENT
Start: 2024-12-27

## 2024-12-27 RX ORDER — HEPARIN 100 UNIT/ML
500 SYRINGE INTRAVENOUS
OUTPATIENT
Start: 2025-01-10

## 2024-12-27 RX ORDER — PALONOSETRON 0.05 MG/ML
0.25 INJECTION, SOLUTION INTRAVENOUS
OUTPATIENT
Start: 2024-12-27

## 2024-12-27 RX ORDER — HEPARIN 100 UNIT/ML
500 SYRINGE INTRAVENOUS
OUTPATIENT
Start: 2025-01-03

## 2024-12-27 RX ORDER — BORTEZOMIB 3.5 MG/1
1.5 INJECTION, POWDER, LYOPHILIZED, FOR SOLUTION INTRAVENOUS; SUBCUTANEOUS
OUTPATIENT
Start: 2025-01-03

## 2024-12-27 RX ORDER — PALONOSETRON 0.05 MG/ML
0.25 INJECTION, SOLUTION INTRAVENOUS
OUTPATIENT
Start: 2025-01-10

## 2024-12-27 RX ORDER — PALONOSETRON 0.05 MG/ML
0.25 INJECTION, SOLUTION INTRAVENOUS
OUTPATIENT
Start: 2025-01-03

## 2024-12-27 RX ORDER — BORTEZOMIB 3.5 MG/1
1.5 INJECTION, POWDER, LYOPHILIZED, FOR SOLUTION INTRAVENOUS; SUBCUTANEOUS
OUTPATIENT
Start: 2025-01-10

## 2024-12-27 RX ORDER — HEPARIN 100 UNIT/ML
500 SYRINGE INTRAVENOUS
OUTPATIENT
Start: 2024-12-27

## 2024-12-27 RX ORDER — HEPARIN 100 UNIT/ML
500 SYRINGE INTRAVENOUS
OUTPATIENT
Start: 2025-01-17

## 2024-12-27 RX ORDER — BORTEZOMIB 3.5 MG/1
1.5 INJECTION, POWDER, LYOPHILIZED, FOR SOLUTION INTRAVENOUS; SUBCUTANEOUS
OUTPATIENT
Start: 2025-01-17

## 2024-12-27 NOTE — PROGRESS NOTES
HEMATOLOGIC MALIGNANCIES CONSULT NOTE    IDENTIFYING STATEMENT   Miky Cheng) is a 54 y.o. male with a  of 1970 from [place], [place], referred by El Saunders for evaluation of Multiple Myeloma.     HISTORY OF PRESENT ILLNESS:    Referred to Dr. Saunders in 2024 for possible MGUS vs myeloma in setting of worsening renal function. Bone marrow biopsy 24 showed hypocellular marrow with increased plasma cells (15-20%)     Baseline labs 2024  kappa lc 698.20  Lambda lc 8.10  FLCR 86.2  IgG 881, IgA 61, IgM 24    B2M 3.6  Cr 1.95  Ca 9.2  Hgb 17.6    Labs 11/15/24   .04  LLC 7.44  FLCR 46.65  Cr 2.06    Started CyBorD 24      The patient is a 54-year-old male with newly diagnosed kappa light chain multiple myeloma (2024) currently on his third cycle of CyBorD (cyclophosphamide, bortezomib, dexamethasone) induction therapy. He presents to the myeloma clinic for evaluation of autologous hematopoietic stem cell transplant (autoHSCT).  Treatment Tolerance: Reports tolerating CyBorD well overall, with fatigue on the day of treatment but no significant impact on daily functioning. He works full-time as a  and remains active, which is a positive indicator of his functional status.  Referral Rationale: Referred early for transplant consideration due to his strength and good physical condition.  Questions/Concerns: Patient seeks clarification on the transplant process, timeline, and success rates. He expresses motivation to proceed quickly, aiming to improve remission duration and quality of life.  Other Details:  Mild kidney dysfunction (eGFR ~35), noted but manageable with transplant dosing adjustments.  Lives with his wife in [place], with no major home risks identified for post-transplant recovery.  Well-supported at home with plans for a  during treatment.    RELEVANT COMORBID CONDITIONS:    Past Medical History:   Diagnosis Date     Disorder of kidney and ureter     Multiple myeloma not having achieved remission        Family History   Problem Relation Name Age of Onset    Hypertension Mother      Diabetes Mother      Skin cancer Mother      Hypertension Father      Cancer Sister         Social History     Socioeconomic History    Marital status:    Tobacco Use    Smoking status: Every Day     Passive exposure: Current    Smokeless tobacco: Current     Types: Chew   Substance and Sexual Activity    Alcohol use: Not Currently    Drug use: Not Currently     Types: Marijuana    Sexual activity: Yes     Social Drivers of Health     Financial Resource Strain: Low Risk  (8/14/2024)    Overall Financial Resource Strain (CARDIA)     Difficulty of Paying Living Expenses: Not very hard   Food Insecurity: Unknown (8/14/2024)    Hunger Vital Sign     Worried About Running Out of Food in the Last Year: Never true   Physical Activity: Insufficiently Active (8/14/2024)    Exercise Vital Sign     Days of Exercise per Week: 2 days     Minutes of Exercise per Session: 30 min   Stress: Stress Concern Present (8/14/2024)    [employer] - Occupational Stress Questionnaire     Feeling of Stress : To some extent   Housing Stability: Unknown (8/14/2024)    Housing Stability Vital Sign     Unable to Pay for Housing in the Last Year: Patient declined         MEDICATIONS:     Current Outpatient Medications on File Prior to Visit   Medication Sig Dispense Refill    acyclovir (ZOVIRAX) 400 MG tablet Take 1 tablet (400 mg total) by mouth once daily. 30 tablet 11    cycloPHOSphamide (CYTOXAN) 50 mg capsule Take 12 capsules (600 mg total) by mouth every 7 days on days 1, 8, 15 and 22 of each 28 day cycle. Take with lots of water. with 1 other cycloPHOSphamide prescription for 625 mg total. 48 capsule 5    cycloPHOSphamide 25 mg Cap Take 1 capsule (25 mg total) by mouth every 7 days on days 1, 8, 15 and 22 of each 28 day  cycle. Take with lots of water. with 1 other cycloPHOSphamide prescription for 625 mg total. 4 capsule 5    dexAMETHasone (DECADRON) 4 MG Tab Take 10 tablets (40 mg total) by mouth every 7 days. on days 1, 8, 15, and 22 of each chemotherapy cycle. Take with food. 40 tablet 5    sulfamethoxazole-trimethoprim 400-80mg (BACTRIM) 400-80 mg per tablet Take 1 tablet by mouth once daily. for 360 doses 30 tablet 11     No current facility-administered medications on file prior to visit.       ALLERGIES:   Review of patient's allergies indicates:   Allergen Reactions    Penicillins Rash    Baclofen Other (See Comments)     Can't sit still, causes body to twitch        ROS:       Review of Systems   Constitutional: Negative.    HENT:  Negative.     Eyes: Negative.    Respiratory: Negative.     Cardiovascular: Negative.    Gastrointestinal: Negative.    Genitourinary: Negative.     Musculoskeletal:         Leg cramps   Skin: Negative.    Neurological: Negative.    Hematological: Negative.    Psychiatric/Behavioral: Negative.         PHYSICAL EXAM:  There were no vitals filed for this visit.      Physical Exam  Vitals and nursing note reviewed.   Constitutional:       General: He is not in acute distress.     Appearance: Normal appearance. He is normal weight. He is not toxic-appearing.   HENT:      Head: Normocephalic and atraumatic.   Eyes:      General: No scleral icterus.     Conjunctiva/sclera: Conjunctivae normal.   Cardiovascular:      Rate and Rhythm: Normal rate.   Pulmonary:      Effort: Pulmonary effort is normal. No respiratory distress.   Abdominal:      General: There is no distension.   Musculoskeletal:         General: No deformity.   Skin:     Coloration: Skin is not jaundiced.      Findings: No erythema.   Neurological:      General: No focal deficit present.      Mental Status: He is alert and oriented to person, place, and time. Mental status is at baseline.   Psychiatric:         Mood and Affect: Mood  normal.         Behavior: Behavior normal.         Thought Content: Thought content normal.       LAB:   Results for orders placed or performed in visit on 12/20/24   CBC w/ Manual Differential    Collection Time: 12/20/24  7:41 AM   Result Value Ref Range    WBC 5.5 4.5 - 10.0 10*3/uL    RBC 4.45 (L) 4.70 - 6.10 10*6/uL    Hemoglobin 14.1 14.0 - 18.0 g/dL    Hematocrit 41.4 (L) 42.0 - 52.0 %    MCV 93.0 80.0 - 97.0 fL    MCH 31.7 27.0 - 32.0 pg    MCHC 34.1 32.0 - 36.0 %    RDW RBC Auto-Rto 13.6 0.0 - 15.5 %    Platelets 188 130 - 400 10*3/uL    MPV 10.5 9.2 - 12.2 fL    Neutrophils 71 50 - 80 %    Neutrophils Absolute 3.9 1.8 - 7.7 10*3/uL    Lymphocytes 17 (L) 20 - 45 %    Lymphocytes Absolute 0.9 (L) 1.2 - 4.0 10*3/uL    Monocytes 7 2 - 10 %    Monocytes Absolute 0.4 0.1 - 0.8 10*3/uL    Eosinophils 2 0 - 6 %    Eosinophils Absolute 0.1 0.0 - 0.6 10*3/uL    Myelocytes 1 0 - 1 %    MYELOCYTE ABSOLUTE COUNT 0.1 (H) 0.0 - 0.0310 10*3/uL    REACTIVE LYMPHOCYTES 2 0 - 5 %    Nucleated RBC-manual 0 /100 WBC    Cells Counted 100     Platelet Estimate Normal     PLATELET MORPHOLOGY NORMAL     Anisocytosis 1+      CLINICAL DATA:  MGUS.  LEFT ILIUM BONE MARROW:  ADDENDUM REPORT  CYTOGENETICS  8/12/2024    HYPOCELLULAR BONE MARROW WITH PLASMA CELL DYSCRASIA (SEE COMMENT).  LEFT-SHIFTED AND MILDLY ATYPICAL MYELOID MATURATION PATTERN VIA FLOW CYTOMETRY.  CYTOGENETICS: 46,XY[20] - NORMAL MALE KARYOTYPE.    COMMENT: The clinical history of MGUS is noted, and plasma cells are increased, however they were in insufficient numbers to evaluate clonality.  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  Evaluation of other traditional CRAB features, as well as a repeat of serum protein electrophoresis and molecular studies should be considered.  The atypical myeloid element noted on flow analysis is not appreciated  within the material  submitted, which has low (<2%) blasts.      GROSS DESCRIPTION:  SPECIMEN: Bone marrow aspirate with core biopsy for pathologist's review  PROCEDURE: Peripheral smear, aspiration, and biopsy    WBC 9.2  RBC 5.49  HGB 16.4  HCT 49.3  MCV 89.8  MCH 29.9  MCHC 33.3  RDW 12.1  PLATELETS 213  MPV 10.4  NEUT 70  BANDS 0 LYMPHS 25  MONOS 4  EOS 1      MICROSCOPIC DESCRIPTION:  PERIPHERAL SMEAR  RBCs: show a normocytic, normochromic red cell population with normal RDW.  No rouleaux, schistocytes, significant polychromasia, or nucleated red blood cells are seen.  WBCs: normal in number with a normal distribution.  No dysplastic neutrophils, lymphocytosis, monocytosis, or definitive blasts are seen.  Platelets: normal in number and morphology.    ASPIRATE SMEARS (evaluation of 4 [place]-Gi[place]a stained slides)  CELLULARITY: hypocellular.  M:E ratio: 3:1.  ERYTHROID LINE: adequate in number with normoblastic maturation.  MYELOID LINE: complete maturation, but with a slight tendency toward immaturity, with no increase in blasts.  MEGAKARYOCYTIC LINE: adequate in number and morphology.  LYMPHOID CELLS: not increased, no atypical lymphocytes or aggregates seen.  PLASMA CELLS: increased.    BONE MARROW TREPHINE BIOPSY and CLOT SECTION  TECHNICAL QUALITY: adequate biopsy, evaluated following decalcification; adequate clot section.  CELLULARITY: 20-30% cellular (hypocellular).  ERYTHROID LINE: adequate in number.  MYELOID LINE: complete maturation with no increase in blasts (<2% blasts).  MEGAKARYOCYTIC LINE: adequate in number and morphology.  LYMPHOID CELLS: not increased, no atypical lymphocytes or atypical aggregates are seen.  PLASMA CELLS: increased (15 to 20%).    CYTOCHEMICAL STAINS  PRUSSIAN BLUE: stainable iron is adequate in the aspirate smear. No ring sideroblasts are noted.  Control slide stained appropriately.    IMMUNOHISTOCHEMICAL STAINS  An immunohistochemical panel is performed on paraffin embedded tissue (core biopsy  and clot section) and stains as follows:   (plasma cell marker): positive (4+ of 4+, cytoplasmic in 15 to 20% of cells)  CD34: positive (4+ of 4+, cytoplasmic in <2% of cells)  Control slides stained appropriately.    FLOW CYTOMETRY PERFORMED AND INTERPRETED BY: [email or web address]    INTERPRETATION: Left-shifted and mildly atypical myeloid maturation pattern.    COMMENT: Flow shows a mildly atypical and left-shifted myeloid maturation pattern.  The findings raise concern for a myeloid neoplasm, although they can also be seen in some reactive/infectious processes, or with drugs such as growth factor therapy.    Correlation with the manual blast count (gold standard) is necessary for confirmation since flow cytometry may over or under estimate the blast count.  There is no flow immunophenotypic evidence of a lymphoproliferative disorder, and plasma cells are  insufficient in number to evaluate for clonality on the plasma cell add on panel.  Please correlate the result with morphological findings, other pertinent laboratory data, and clinical information.  For additional information, please see separate flow  cytometry report.     Specimen Collected: 08/02/24 00:00 CDT Last Resulted: 08/12/24 13:46 CDT     PROBLEMS ASSESSED THIS VISIT:    1. Multiple myeloma not having achieved remission    2. Stage 3b chronic kidney disease    3. Immunodeficiency due to chemotherapy    4. Acute renal failure, unspecified acute renal failure type    5. Muscle cramps    6. Hypophosphatemia        MULTIPLE MYELOMA DISEASE CHARACTERISTICS:    Date of diagnosis: 8/2/24    Baseline serologic characteristics at diagnosis:    - M-protein: n/a g/dl   - JANIS: n/a   - Free light chains:    - Kappa 698.20 mg/dl    - Lambda 8.10 mg/dl    - Kappa/Lambda ratio 86.20    Prognostic Factors at diagnosis:   - Serum albumin: 4.1   - LDH: 180   - Beta-2 microglobulin: 3.6   - Chromosomal abnormalities on FISH: n/a (normal cytogenetic  karyotype)    R-ISS Stage: II    PLAN:       Transplant Eligibility  - This patient is eligible for autologous stem cell transplant. We will refer to transplant clinic.     Therapy Plan  - Treatment with other - specify CyBorD  - Cycle length: 28 days  - Schedule of treatment:   - Cytoxan 625mg on days 1, 8, 15, 22   - Bortezomib 2.1mg/m2 on days 1, 8, 15, 22   - Dexamethasone 40mg on days 1, 8, 15, 22    Antimicrobial Prophylaxis  - HSV/VZV: acyclovir  - Bacterial: none  - PJP: TMP/SMX  - Other prophylaxis na    Thromboprophylaxis  -  none (not on IMiD)    Response Assessment:  - Evaluate serologic studies with each cycle (SPEP, JANIS, free light chains)  - Obtain PET/CT for imaging response after 4 cycles  - Obtain Bone marrow biopsy for response assessment after 4 cycles    FOLLOW-UP PLAN:    Multiple Myeloma, kappa light chain, on induction (CyBorD):  Assessment: Responding well to induction therapy with preserved functional status. Preparing for autoHSCT for deeper remission.  Currently on C3; labs prior to C3 suggestive of NV based on .2-->347.04.  Plan:  Coordinate with transplant specialist (Dr. Cardoso) for evaluation and planning.  Arrange restaging studies, including:  Bone marrow biopsy to confirm remission status after C4,  PET CT after C4  Updated serum and urine myeloma markers prior to transplant clinic eval.  Continue CyBorD with Dr. Saunders in [place] as planned until transplant timing is confirmed.  Recommend dental evaluation for initiation of antiresorptive therapy (ie Zometa or Xgeva.)    2. Transplant Candidacy:  Assessment: Good candidate for autoHSCT based on functional status, disease control, and strong support system.  Plan:  -discussed average 67 month median PFS following transplant (per determination trial) with standard risk disease but that early relapses as well as much more durable remissions >10 years possible with transplant approach in modern therapy era   -discussed my  recommendation for early over late transplant; but that late transplant could be considered given standard risk disease   -discussed approximately 2 week hospitalization during which most pts experience moderate chemotherapy related side effects   -discussed approximate day +100 restaging and likely recommendations for Revlimid maintenance for at least 2 years post transplant  and possibly indefinitely or until intolerance or progression   -discussed need for revaccinations starting at day +180   -discussed need for appt and medication compliance following transplant   -discussed need for full time caretaker through day +30   -patient  and family  expressed understanding and all questions answered;   -I will have transplant coordinator contact patient promptly to discuss logistics and address any remaining questions/concerns.    3. Renal Impairment:  Assessment: Likely myeloma-related kidney dysfunction; no contraindication to melphalan with dose adjustment.  Plan:  Monitor renal function and adjust transplant medications as needed.    4. Post-Transplant Planning:  Assessment: Maintenance therapy with Revlimid (lenalidomide) post-transplant is the standard of care to prolong remission.  Plan:  Educate patient about maintenance therapy expectations and potential for long-term remission.  @Route Chart for Scheduling  BMT Route Chart for Scheduling      Treatment Plan Information   OP CyBorD: CYCLOPHOSPHAMIDE PO + ONCE WEEKLY BORTEZOMIB + DEXAMETHASONE Q4W  El Saunders MD   Associated diagnosis: Multiple myeloma not having achieved remission  Beta-2-microglobulin (mg/L): 3.6, Albumin (g/dL): 4.1, ISS: Stage II, High-risk cytogenetics: Absent, LDH: Normal noted on 8/15/2024   Line of treatment: First Line  Treatment Goal: Curative     Upcoming Treatment Dates - OP CyBorD: CYCLOPHOSPHAMIDE PO + ONCE WEEKLY BORTEZOMIB + DEXAMETHASONE Q4W     12/20/2024       Pre-Medications       palonosetron injection 0.25 mg        Chemotherapy       bortezomib (VELCADE) injection 3.25 mg  12/27/2024       Pre-Medications       palonosetron injection 0.25 mg       Chemotherapy       bortezomib (VELCADE) injection 3.25 mg  1/3/2025       Pre-Medications       palonosetron injection 0.25 mg       Chemotherapy       bortezomib (VELCADE) injection 3.25 mg  1/10/2025       Pre-Medications       palonosetron injection 0.25 mg       Chemotherapy       bortezomib (VELCADE) injection 3.25 mg      Sara De León NP  Section of Multiple Myeloma and Plasma Cell Disorders  Malignant Hematology and Cellular Therapy  [facility]     Total time of this visit, including time spent face to face with patient and/or via video/audio, and also in preparing for today's visit for MDM and documentation. (Medical Decision Making, including consideration of possible diagnoses, management options, complex medical record review, review of diagnostic tests and information, consideration and discussion of significant complications based on comorbidities, and discussion with providers involved with the care of the patient) 90 minutes. Greater than 50% was spent face to face with the patient counseling and coordinating care.

## 2024-12-27 NOTE — PROGRESS NOTES
HEMATOLOGY FOLLOW UP CONSULTATION NOTE    Patient ID: Miky Carrasco is a 54 y.o. male.    Chief Complaint:  Multiple myeloma    HPI:  Patient is a 53-year-old male with past medical history of hyperlipidemia who recently was evaluated by Nephrology for worsening kidney function and underwent further lab work including serum protein electrophoresis which showed findings concerning for possible MGUS versus active multiple myeloma with decline in kidney.  Patient presents to our clinic today for further evaluation.  Voices no acute complaints. Denies bone pain, activity and appetite changes.              Past Medical History:   Diagnosis Date    Disorder of kidney and ureter     Multiple myeloma not having achieved remission        Family History   Problem Relation Name Age of Onset    Hypertension Mother      Diabetes Mother      Skin cancer Mother      Hypertension Father      Cancer Sister         Social History     Socioeconomic History    Marital status:    Tobacco Use    Smoking status: Every Day     Passive exposure: Current    Smokeless tobacco: Current     Types: Chew   Substance and Sexual Activity    Alcohol use: Not Currently    Drug use: Not Currently     Types: Marijuana    Sexual activity: Yes     Social Drivers of Health     Financial Resource Strain: Low Risk  (8/14/2024)    Overall Financial Resource Strain (CARDIA)     Difficulty of Paying Living Expenses: Not very hard   Food Insecurity: Unknown (8/14/2024)    Hunger Vital Sign     Worried About Running Out of Food in the Last Year: Never true   Physical Activity: Insufficiently Active (8/14/2024)    Exercise Vital Sign     Days of Exercise per Week: 2 days     Minutes of Exercise per Session: 30 min   Stress: Stress Concern Present (8/14/2024)    [employer] - Occupational Stress Questionnaire     Feeling of Stress : To some extent   Housing Stability: Unknown (8/14/2024)    Housing Stability Vital Sign      Unable to Pay for Housing in the Last Year: Patient declined         No past surgical history on file.      Review of systems:  Review of Systems   Constitutional:  Negative for activity change, appetite change, chills, diaphoresis, fatigue and unexpected weight change.   HENT:  Negative for congestion, facial swelling, hearing loss, mouth sores, trouble swallowing and voice change.    Eyes:  Negative for photophobia, pain, discharge and itching.   Respiratory:  Negative for apnea, cough, choking, chest tightness and shortness of breath.    Cardiovascular:  Negative for chest pain, palpitations and leg swelling.   Gastrointestinal:  Negative for abdominal distention, abdominal pain, anal bleeding and blood in stool.   Endocrine: Negative for cold intolerance, heat intolerance, polydipsia and polyphagia.   Genitourinary:  Negative for difficulty urinating, dysuria, flank pain and hematuria.   Musculoskeletal:  Negative for arthralgias, back pain, joint swelling, myalgias, neck pain and neck stiffness.   Skin:  Negative for color change, pallor and wound.   Allergic/Immunologic: Negative for environmental allergies, food allergies and immunocompromised state.   Neurological:  Negative for dizziness, seizures, facial asymmetry, speech difficulty, light-headedness, numbness and headaches.   Hematological:  Negative for adenopathy. Does not bruise/bleed easily.   Psychiatric/Behavioral:  Negative for agitation, behavioral problems, confusion, decreased concentration and sleep disturbance.                                Physical Exam  Vitals and nursing note reviewed.   Constitutional:       General: He is not in acute distress.     Appearance: Normal appearance. He is not ill-appearing.   HENT:      Head: Normocephalic and atraumatic.      Nose: No congestion or rhinorrhea.   Eyes:      General: No scleral icterus.     Extraocular Movements: Extraocular movements intact.      Pupils: Pupils are equal, round, and reactive  to light.   Cardiovascular:      Rate and Rhythm: Normal rate and regular rhythm.      Pulses: Normal pulses.      Heart sounds: Normal heart sounds. No murmur heard.     No gallop.   Pulmonary:      Effort: Pulmonary effort is normal. No respiratory distress.      Breath sounds: Normal breath sounds. No stridor. No wheezing or rhonchi.   Abdominal:      General: Bowel sounds are normal. There is no distension.      Palpations: There is no mass.      Tenderness: There is no abdominal tenderness. There is no guarding.   Musculoskeletal:         General: No swelling, tenderness, deformity or signs of injury. Normal range of motion.      Cervical back: Normal range of motion and neck supple. No rigidity. No muscular tenderness.      Right lower leg: No edema.      Left lower leg: No edema.   Skin:     General: Skin is warm.      Coloration: Skin is not jaundiced or pale.      Findings: No bruising or lesion.   Neurological:      General: No focal deficit present.      Mental Status: He is alert and oriented to person, place, and time.      Cranial Nerves: No cranial nerve deficit.      Sensory: No sensory deficit.      Motor: No weakness.      Gait: Gait normal.   Psychiatric:         Mood and Affect: Mood normal.         Behavior: Behavior normal.         Thought Content: Thought content normal.     There were no vitals filed for this visit.    There is no height or weight on file to calculate BSA.    Assessment/Plan:      ECOG 1    Multiple myeloma:    == reviewed prior lab work done with Nephrology.  Patient is noted to have worsening renal failure and also has presence of monoclonal band on immuno fixation studies.  I will initiate workup for multiple myeloma and will include a bone marrow biopsy as well as a PET scan to rule out any osteolytic lesions.  Discussed these findings in detail with patient giving him opportunity to ask questions  == 8/18/24:  Bone marrow biopsy done showed increased number of plasma  cells.  No percentage mentioned but reported to be more than normal and in the smoldering range.  With evidence of worsening renal failure along with elevated kappa free light chains and kappa/lambda free light chain ratio findings are consistent with active multiple myeloma.  Due to decline in kidney function my initial plan would be to initiate CyBorD.  Patient reports that he is on a job which requires lot of traveling and unable to come to clinics  regularly do receive his treatment.   ==9/23/24:  Patient and wife here today to discuss upcoming chemotherapy/immunotherapy. An extensive discussion was had which included a thorough discussion of potential side effect profile, risk versus benefits, and expected outcomes.  Risks, including but not limited to, possible hair loss, bone marrow damage, damage to body organs, allergic reactions, sterility, nausea/vomiting, constipation/diarrhea, sores in the mouth, secondary cancers, and rarely death were all discuss.  Specific side effects pertaining to their chemotherapy/immunotherapy medications were discussed as well.  The patient agrees with the plan and all questions and their support systems questions have been answered to their satisfaction.  Premedications were prescribed and patient was educated on appropriate usage.  Patient was educated on when to call, and given the number to call, or to notify the provider including but not limited to:  Persistent nausea and vomiting, dehydration, fever greater than 100.4 lasting over 1 hour induration or isolated feeders greater than 101, rash while on active chemotherapy or immunotherapy, severe pain or new onset pain not controlled by current pain medication regimen.   ==10/04/2024: Tolerating chemotherapy well, pt has leg cramps has had all of his life worse at night, potassium mg normal, discussed medication, pt would like to try otc topicals such as biofreeze or icyhot with lidocaine first and if not helpful will  notify clinic and will send our rx.  No other complaints, will continue chemotherapy, plan to see next week and if continues to tolerate well plan to see every other week.  ==10/11/2024: Magnesium wnl, but hypophosphatemia noted, pt denies diarrhea or antacid use.  Will start gentle supplementation with po multivitamin, discussed with patient his renal function and may need po phosphate if repeat labs show continued low phosphorus, but black box warning in CKD for po phosphate.  Will try multivitamin containing phos as well as increasing dietary intake - reviewed dietary sources of phosphorus.  Will also check vitamin D level, Vit D deficiency can cause/worsen hypophosphatemia and recheck phos level to determine if transient.  Pt is asymptomatic at this time has occasional leg cramps for all of his life, no increase or other symptoms.  Labs reviewed will continue chemotherapy   ==11/22/2024: Phos wnl, tolerating Cybor D well, leg cramps improved with baclofen, labs reviewed patient is cleared for chemotherapy. Will now also make referral to BMT team in [place].   ==12/27/2024: Has appt with Dr. Ferrer 1/16/2025, tolerating CyBorD well.  May use labs from BMT appt on 1/16/25 for chemo scheduled on 1/17/2024, will need cbc cmp. Denies complaints, will continue with CyBorD.      2. Prophylaxis for infection prevention  == Continue Bactrim and acyclovir    3. Hypophosphatemia  ==Asymptomatic, normal magnesium level - pt has had leg cramps all of life, no increase previous phos levels wnl  ==Recheck to determine if transient, avoid antacids, check vitamin D level  ==encourage po intake, will start multivitamin containing phosphorus, may need higher dose if does not resolve, will prescribe with caution given renal function  == 11/22/24: Resolved    Plan:  Continue  CyBorD -   weekly treatment with labs: Cbc cmp q weekly   NP appt in 4 weeks  Continue Bactrim and valtrex prophylaxis  Keep appt with BMT team in  [place]  Myeloma labs q 4 weeks   Vitamin D, avoid antacids, diuretics, mag is normal avoid etoh, meat fish poultry, nuts and cereal    Pt is instructed to RTC with labs for continued monitoring of treatment as instructed.     Total time spent in counseling and discussion about further management options including relevant lab work, treatment,  prognosis, medications and intended side effects was more than 30 minutes. More than 50 % of the time was spent in counseling and coordination of care.  This includes face to face time and non-face to face time preparing to see the patient (eg, review of tests), Obtaining and/or reviewing separately obtained history, Documenting clinical information in the electronic or other health record, Independently interpreting resultsand communicating results to the patient/family/caregiver, or Care coordination.

## 2024-12-29 LAB
ALBUMIN, ELECTROPHORESIS: 4.17 G/DL (ref 3.75–5.01)
ALPHA1 GLOB FLD ELPH-MCNC: 0.4 G/DL (ref 0.19–0.46)
ALPHA2 GLOB FLD ELPH-MCNC: 0.72 G/DL (ref 0.48–1.05)
B-GLOBULIN FLD ELPH-MCNC: 0.65 G/DL (ref 0.48–1.1)
EER PROTEIN ELECTROPHORESIS, SERUM: ABNORMAL
GAMMA GLOB FLD ELPH-MCNC: 0.46 G/DL (ref 0.62–1.51)
IGA SERPL-MCNC: 30 MG/DL (ref 68–408)
IGG SERPL-MCNC: 505 MG/DL (ref 768–1632)
IGM: 18 MG/DL (ref 35–263)
INTERPRETATION SERPL IFE-IMP: ABNORMAL
KAPPA FREE LIGHT CHAINS: 526.83 MG/L (ref 3.3–19.4)
KAPPA/LAMBDA FLC RATIO: 91.46 (ref 0.26–1.65)
LAMBDA LIGHT CHAIN, FREE, SERUM: 5.76 MG/L (ref 5.71–26.3)
MONOCLONAL PROTEIN: ABNORMAL G/DL
TOTAL PROTEIN, ELECTROPHORESIS: 6.4 G/DL (ref 6.3–8.2)

## 2025-01-15 NOTE — PROGRESS NOTES
Section of Hematology and Stem Cell Transplantation  New Patient Consult     Date of visit: 1/16/25  Visit diagnosis: No primary diagnosis found.  Referred by:  Dr. El Saunders    Oncologic History:     Primary Oncologic Diagnosis: Multiple Myeloma    7/2024: First labs in [facility] system showing renal dysfunction with creatinine 1.9. He was referred to Dr. Saunders for further evaluation.   7/2024: Baseline myeloma labs - K , L FLC 8.10, FLC ratio 86; IgG 881, IgA 61, IgM 24; ; B2M 3.6; Cr 1.95, Ca 9.2, Hgb 17.6.  8/2/24: Bone marrow biopsy showed increased number of plasma cells (20-25%). [place] red negative. Diploid karyotype. FISH not available.   9/27/24: Cycle 1 day 1 of CyBorD.  12/27/24: Cycle 4 day 1 of CyBorD.    History of Present Ilness:   Miky Carrasco (Miky) is a pleasant 54 y.o.male with multiple myeloma here to discuss transplant. He has currently completed 4 cycles of CyBorD. He is doing good overall. He states he has been very tired. He is still working 6 days a week as a  in [employer] right now. He has been tolerating treatment very well.    PAST MEDICAL HISTORY:   Past Medical History:   Diagnosis Date    Disorder of kidney and ureter     Multiple myeloma not having achieved remission        PAST SURGICAL HISTORY:   No past surgical history on file.    PAST SOCIAL HISTORY:  Social History     Tobacco Use    Smoking status: Every Day     Passive exposure: Current    Smokeless tobacco: Current     Types: Chew   Substance Use Topics    Alcohol use: Not Currently    Drug use: Not Currently     Types: Marijuana       FAMILY HISTORY:  Family History   Problem Relation Name Age of Onset    Hypertension Mother      Diabetes Mother      Skin cancer Mother      Hypertension Father      Cancer Sister         CURRENT MEDICATIONS:   Current Outpatient Medications   Medication Sig    acyclovir (ZOVIRAX) 400 MG tablet Take 1 tablet (400 mg total) by mouth once daily.     cycloPHOSphamide (CYTOXAN) 50 mg capsule Take 12 capsules (600 mg total) by mouth every 7 days on days 1, 8, 15 and 22 of each 28 day cycle. Take with lots of water. with 1 other cycloPHOSphamide prescription for 625 mg total.    cycloPHOSphamide 25 mg Cap Take 1 capsule (25 mg total) by mouth every 7 days on days 1, 8, 15 and 22 of each 28 day cycle. Take with lots of water. with 1 other cycloPHOSphamide prescription for 625 mg total.    dexAMETHasone (DECADRON) 4 MG Tab Take 10 tablets (40 mg total) by mouth every 7 days. on days 1, 8, 15, and 22 of each chemotherapy cycle. Take with food.    sulfamethoxazole-trimethoprim 400-80mg (BACTRIM) 400-80 mg per tablet Take 1 tablet by mouth once daily. for 360 doses     No current facility-administered medications for this visit.       ALLERGIES:   Review of patient's allergies indicates:   Allergen Reactions    Penicillins Rash    Baclofen Other (See Comments)     Can't sit still, causes body to twitch       Review of Systems:     Pertinent positives and negatives included in the HPI. Otherwise a complete review of systems is negative.    Physical Exam:     Vitals:    01/16/25 1300   BP: 128/65   Pulse: 68   Resp: 18   Temp: 98 °F (36.7 °C)       General: Appears well, NAD  HEENT: MMM, no OP lesions  Pulmonary: CTAB, no increased work of breathing, no W/R/C  Cardiovascular: S1S2 normal, RRR, no M/R/G  Abdominal: Soft, NT, ND, BS+, no HSM  Extremities: No C/C/E  Neurological: AAOx4, grossly normal, no focal deficits  Dermatologic: No appreciable rashes or lesions  Lymphatic: No cervical, axillary, or inguinal lymphadenopathy     ECOG Performance Status: (foot note - ECOG PS provided by Eastern Cooperative Oncology Group) 0 - Asymptomatic    Karnofsky Performance Score:  100%- Normal, No Complaints, No Evidence of Disease    Labs:   Lab Results   Component Value Date    WBC 7.02 01/16/2025    RBC 4.69 01/16/2025    HGB 14.9 01/16/2025    HCT 43.8 01/16/2025    MCV 93  01/16/2025    MCH 31.8 (H) 01/16/2025    MCHC 34.0 01/16/2025    RDW 14.4 01/16/2025     01/16/2025    MPV 11.3 01/16/2025    GRAN 4.6 01/16/2025    GRAN 65.0 01/16/2025    LYMPH 1.5 01/16/2025    LYMPH 20.9 01/16/2025    MONO 0.7 01/16/2025    MONO 9.8 01/16/2025    EOS 0.1 01/16/2025    BASO 0.04 01/16/2025    EOSINOPHIL 1.7 01/16/2025    BASOPHIL 0.6 01/16/2025       CMP  Sodium   Date Value Ref Range Status   01/10/2025 141 135 - 145 mmol/L Final     Potassium   Date Value Ref Range Status   01/10/2025 4.4 3.6 - 5.2 mmol/L Final     Chloride   Date Value Ref Range Status   01/10/2025 105 100 - 108 mmol/L Final     CO2   Date Value Ref Range Status   01/10/2025 26 21 - 32 mmol/L Final     Glucose   Date Value Ref Range Status   01/10/2025 135 (H) 70 - 110 mg/dL Final     BUN   Date Value Ref Range Status   01/10/2025 24 (H) 7 - 18 mg/dL Final     Creatinine   Date Value Ref Range Status   01/10/2025 2.09 (H) 0.70 - 1.30 mg/dL Final     Calcium   Date Value Ref Range Status   01/10/2025 8.8 8.8 - 10.5 mg/dL Final     Total Protein   Date Value Ref Range Status   01/10/2025 6.6 6.4 - 8.2 g/dL Final     Albumin   Date Value Ref Range Status   01/10/2025 3.9 3.4 - 5.0 g/dL Final     Total Bilirubin   Date Value Ref Range Status   01/10/2025 0.5 0.0 - 1.0 mg/dL Final     Alkaline Phosphatase   Date Value Ref Range Status   01/10/2025 90 46 - 116 U/L Final     AST   Date Value Ref Range Status   01/10/2025 19 15 - 37 U/L Final     ALT   Date Value Ref Range Status   01/10/2025 36 12 - 78 U/L Final     Anion Gap   Date Value Ref Range Status   01/10/2025 10.0 3.0 - 11.0 mmol/L Final         Pathology:  Reviewed     Assessment and Plan:   Miky Carrasco (Miky) is a pleasant 54 y.o.male with multiple myeloma here to discuss transplant.     Autologous stem cell transplant candidate:  We discussed the role for autologous stem cell transplantation in multiple myeloma as a means to improve progression free survival  (not curative) and provide prolonged disease control. We had an extensive discussion about the rationale, logistics, risks, and benefits. We reviewed the requirement to stay in the [place] area for 30 days with a caregiver at all times. We discussed the risks, including infection, organ toxicity, relapse of disease, and secondary cancers. We reviewed the need for maintenance therapy after day 100. Ideally we will move forward with transplant ASAP depending on response (VGPR or CR) - at this time his FLC ratio remains elevated.   Coordinator: Kayla Ewing  Conditioning Regimen: Melphalan 200 mg/m2  Cell Dose: 5-10 x 10^6 CD34/kg    Kappa FLC restricted multiple myeloma, R-ISS stage II:  Patient was referred to hematology oncology in 7/2024 due to worsening renal function. He was worked up for multple myeloma and his bone marrow biopsy on 8/2/24 showed increased number of plasma cells (20-25%). His marrow and the evidence of worsening renal failure, along with elevated kappa free light chains and kappa/lambda free light chain ratio findings were consistent with active multiple myeloma. He has completed 4 cycles of CyBorD and is planned to start next cycle on 1/24/25.    Immunodeficiency   Patient is currently on bactrim and acyclovir    Orders/Follow Up:           Route Chart for Scheduling    BMT Chart Routing      Follow up with physician 1 month. Virtual visit in approx 1 month (Thursday afternoon)   Follow up with PATRICIA    Provider visit type    Infusion scheduling note    Injection scheduling note    Labs CBC, CMP, free light chains, immunoglobulins, immunofixation, SPEP, phosphorus and magnesium   Scheduling:  Preferred lab:  Lab interval:  Please add FLC, SPEP, JANIS, IgG to next labs   Imaging    Pharmacy appointment    Other referrals                  Treatment Plan Information   OP CyBorD: CYCLOPHOSPHAMIDE PO + ONCE WEEKLY BORTEZOMIB + DEXAMETHASONE Q4W  El Saunders MD   Associated diagnosis: Multiple  myeloma not having achieved remission  Beta-2-microglobulin (mg/L): 3.6, Albumin (g/dL): 4.1, ISS: Stage II, High-risk cytogenetics: Absent, LDH: Normal noted on 8/15/2024   Line of treatment: First Line  Treatment Goal: Curative     Upcoming Treatment Dates - OP CyBorD: CYCLOPHOSPHAMIDE PO + ONCE WEEKLY BORTEZOMIB + DEXAMETHASONE Q4W     1/10/2025       Pre-Medications       palonosetron injection 0.25 mg       Chemotherapy       bortezomib (VELCADE) injection 3.25 mg  1/17/2025       Pre-Medications       palonosetron injection 0.25 mg       Chemotherapy       bortezomib (VELCADE) injection 3.25 mg  1/24/2025       Pre-Medications       palonosetron injection 0.25 mg       Chemotherapy       bortezomib (VELCADE) injection 3.25 mg  1/31/2025       Pre-Medications       palonosetron injection 0.25 mg       Chemotherapy       bortezomib (VELCADE) injection 3.25 mg        Total time of this visit was 65 minutes, including time spent face to face with patient and/or via video/audio, and also in preparing for today's visit for MDM and documentation. (Medical Decision Making, including consideration of possible diagnoses, management options, complex medical record review, review of diagnostic tests and information, consideration and discussion of significant complications based on comorbidities, and discussion with providers involved with the care of the patient). Greater than 50% was spent face to face with the patient counseling and coordinating care.    Edson Cardoso MD  Malignant Hematology, Stem Cell Transplant, and Cellular Therapy  [facility]

## 2025-01-16 ENCOUNTER — TELEPHONE (OUTPATIENT)
Dept: HEMATOLOGY/ONCOLOGY | Facility: CLINIC | Age: 55
End: 2025-01-16
Payer: COMMERCIAL

## 2025-01-16 ENCOUNTER — DOCUMENTATION ONLY (OUTPATIENT)
Dept: HEMATOLOGY/ONCOLOGY | Facility: CLINIC | Age: 55
End: 2025-01-16
Payer: COMMERCIAL

## 2025-01-16 ENCOUNTER — OFFICE VISIT (OUTPATIENT)
Dept: HEMATOLOGY/ONCOLOGY | Facility: CLINIC | Age: 55
End: 2025-01-16
Payer: COMMERCIAL

## 2025-01-16 ENCOUNTER — LAB VISIT (OUTPATIENT)
Dept: LAB | Facility: HOSPITAL | Age: 55
End: 2025-01-16
Attending: INTERNAL MEDICINE
Payer: COMMERCIAL

## 2025-01-16 VITALS
HEIGHT: 71 IN | SYSTOLIC BLOOD PRESSURE: 128 MMHG | TEMPERATURE: 98 F | HEART RATE: 68 BPM | RESPIRATION RATE: 18 BRPM | DIASTOLIC BLOOD PRESSURE: 65 MMHG | WEIGHT: 202.81 LBS | BODY MASS INDEX: 28.39 KG/M2 | OXYGEN SATURATION: 94 %

## 2025-01-16 DIAGNOSIS — Z79.899 IMMUNODEFICIENCY DUE TO CHEMOTHERAPY: ICD-10-CM

## 2025-01-16 DIAGNOSIS — C90.00 MULTIPLE MYELOMA NOT HAVING ACHIEVED REMISSION: ICD-10-CM

## 2025-01-16 DIAGNOSIS — T45.1X5A IMMUNODEFICIENCY DUE TO CHEMOTHERAPY: ICD-10-CM

## 2025-01-16 DIAGNOSIS — Z76.82 STEM CELL TRANSPLANT CANDIDATE: Primary | ICD-10-CM

## 2025-01-16 DIAGNOSIS — D84.821 IMMUNODEFICIENCY DUE TO CHEMOTHERAPY: ICD-10-CM

## 2025-01-16 LAB
ABO + RH BLD: NORMAL
ALBUMIN SERPL BCP-MCNC: 3.6 G/DL (ref 3.5–5.2)
ALP SERPL-CCNC: 77 U/L (ref 40–150)
ALT SERPL W/O P-5'-P-CCNC: 31 U/L (ref 10–44)
ANION GAP SERPL CALC-SCNC: 12 MMOL/L (ref 8–16)
AST SERPL-CCNC: 22 U/L (ref 10–40)
BASOPHILS # BLD AUTO: 0.04 K/UL (ref 0–0.2)
BASOPHILS NFR BLD: 0.6 % (ref 0–1.9)
BILIRUB SERPL-MCNC: 0.4 MG/DL (ref 0.1–1)
BLD GP AB SCN CELLS X3 SERPL QL: NORMAL
BUN SERPL-MCNC: 19 MG/DL (ref 6–20)
CALCIUM SERPL-MCNC: 9.2 MG/DL (ref 8.7–10.5)
CHLORIDE SERPL-SCNC: 106 MMOL/L (ref 95–110)
CO2 SERPL-SCNC: 21 MMOL/L (ref 23–29)
CREAT SERPL-MCNC: 2 MG/DL (ref 0.5–1.4)
DIFFERENTIAL METHOD BLD: ABNORMAL
EOSINOPHIL # BLD AUTO: 0.1 K/UL (ref 0–0.5)
EOSINOPHIL NFR BLD: 1.7 % (ref 0–8)
ERYTHROCYTE [DISTWIDTH] IN BLOOD BY AUTOMATED COUNT: 14.4 % (ref 11.5–14.5)
EST. GFR  (NO RACE VARIABLE): 38.7 ML/MIN/1.73 M^2
GLUCOSE SERPL-MCNC: 108 MG/DL (ref 70–110)
HCT VFR BLD AUTO: 43.8 % (ref 40–54)
HGB BLD-MCNC: 14.9 G/DL (ref 14–18)
IMM GRANULOCYTES # BLD AUTO: 0.14 K/UL (ref 0–0.04)
IMM GRANULOCYTES NFR BLD AUTO: 2 % (ref 0–0.5)
LDH SERPL L TO P-CCNC: 204 U/L (ref 110–260)
LYMPHOCYTES # BLD AUTO: 1.5 K/UL (ref 1–4.8)
LYMPHOCYTES NFR BLD: 20.9 % (ref 18–48)
MAGNESIUM SERPL-MCNC: 2 MG/DL (ref 1.6–2.6)
MCH RBC QN AUTO: 31.8 PG (ref 27–31)
MCHC RBC AUTO-ENTMCNC: 34 G/DL (ref 32–36)
MCV RBC AUTO: 93 FL (ref 82–98)
MONOCYTES # BLD AUTO: 0.7 K/UL (ref 0.3–1)
MONOCYTES NFR BLD: 9.8 % (ref 4–15)
NEUTROPHILS # BLD AUTO: 4.6 K/UL (ref 1.8–7.7)
NEUTROPHILS NFR BLD: 65 % (ref 38–73)
NRBC BLD-RTO: 0 /100 WBC
PHOSPHATE SERPL-MCNC: 3 MG/DL (ref 2.7–4.5)
PLATELET # BLD AUTO: 193 K/UL (ref 150–450)
PMV BLD AUTO: 11.3 FL (ref 9.2–12.9)
POTASSIUM SERPL-SCNC: 3.7 MMOL/L (ref 3.5–5.1)
PROT SERPL-MCNC: 6.8 G/DL (ref 6–8.4)
RBC # BLD AUTO: 4.69 M/UL (ref 4.6–6.2)
SODIUM SERPL-SCNC: 139 MMOL/L (ref 136–145)
SPECIMEN OUTDATE: NORMAL
URATE SERPL-MCNC: 6.7 MG/DL (ref 3.4–7)
WBC # BLD AUTO: 7.02 K/UL (ref 3.9–12.7)

## 2025-01-16 PROCEDURE — 83735 ASSAY OF MAGNESIUM: CPT | Performed by: INTERNAL MEDICINE

## 2025-01-16 PROCEDURE — 3008F BODY MASS INDEX DOCD: CPT | Mod: CPTII,S$GLB,, | Performed by: INTERNAL MEDICINE

## 2025-01-16 PROCEDURE — 3078F DIAST BP <80 MM HG: CPT | Mod: CPTII,S$GLB,, | Performed by: INTERNAL MEDICINE

## 2025-01-16 PROCEDURE — 84100 ASSAY OF PHOSPHORUS: CPT | Performed by: INTERNAL MEDICINE

## 2025-01-16 PROCEDURE — 83615 LACTATE (LD) (LDH) ENZYME: CPT | Performed by: INTERNAL MEDICINE

## 2025-01-16 PROCEDURE — 80053 COMPREHEN METABOLIC PANEL: CPT | Performed by: INTERNAL MEDICINE

## 2025-01-16 PROCEDURE — 85025 COMPLETE CBC W/AUTO DIFF WBC: CPT | Performed by: INTERNAL MEDICINE

## 2025-01-16 PROCEDURE — 86850 RBC ANTIBODY SCREEN: CPT | Performed by: INTERNAL MEDICINE

## 2025-01-16 PROCEDURE — 1159F MED LIST DOCD IN RCRD: CPT | Mod: CPTII,S$GLB,, | Performed by: INTERNAL MEDICINE

## 2025-01-16 PROCEDURE — 1160F RVW MEDS BY RX/DR IN RCRD: CPT | Mod: CPTII,S$GLB,, | Performed by: INTERNAL MEDICINE

## 2025-01-16 PROCEDURE — 3074F SYST BP LT 130 MM HG: CPT | Mod: CPTII,S$GLB,, | Performed by: INTERNAL MEDICINE

## 2025-01-16 PROCEDURE — 84550 ASSAY OF BLOOD/URIC ACID: CPT | Performed by: INTERNAL MEDICINE

## 2025-01-16 PROCEDURE — 99999 PR PBB SHADOW E&M-EST. PATIENT-LVL III: CPT | Mod: PBBFAC,,, | Performed by: INTERNAL MEDICINE

## 2025-01-16 PROCEDURE — 99215 OFFICE O/P EST HI 40 MIN: CPT | Mod: S$GLB,,, | Performed by: INTERNAL MEDICINE

## 2025-01-16 PROCEDURE — 36415 COLL VENOUS BLD VENIPUNCTURE: CPT | Performed by: INTERNAL MEDICINE

## 2025-01-16 NOTE — Clinical Note
Jolene Gallegos and Nelia,  I saw Mr. Carrasco for transplant consultation. We will work as quickly as we can to move forward with transplant. It looks like his FLCs are still fairly elevated. Do you mind letting me know once you get the biopsy results? Also is it possible to add FISH to his latest biopsy and check SPEP+JANIS with next labs?  Thanks,  Edson

## 2025-01-16 NOTE — TELEPHONE ENCOUNTER
"----- Message from Jayjay sent at 1/16/2025 12:35 PM CST -----  Consult/Advisory      Name Of Caller: Self    Contact Preference?:  [phone]     Provider Name: Janae    Does patient feel the need to be seen today?  Yes    What is the nature of the call?: Calling to inform office that he's running late for today's appts due to heavy construction traffic. Inquiring about still being seen today    Additional Notes:  "Thank you for all that you do for our patients"  "

## 2025-01-16 NOTE — TELEPHONE ENCOUNTER
Returned call to patient and wife to verify that dr. Cardoso could still see patient as long as they are not going to be much later than 2pm.    Asked patient to call back and confirm ETA

## 2025-01-16 NOTE — PROGRESS NOTES
Met with patient and spouse to discuss autologous stem cell transplant. Discussed with patient pre-screening requirements. Educated patient on the evaluation process, mobilization, line placement, stem cell collection, and the hospitalization including current visitor's policy. Reviewed with patient lodging and caregiver requirements following transplant as well as the need for follow-up and immunizations. Reading material provided; patient instructed to reach out with further questions.

## 2025-01-20 DIAGNOSIS — Z76.82 STEM CELL TRANSPLANT CANDIDATE: ICD-10-CM

## 2025-01-20 DIAGNOSIS — C90.00 MULTIPLE MYELOMA NOT HAVING ACHIEVED REMISSION: Primary | ICD-10-CM

## 2025-01-24 ENCOUNTER — OFFICE VISIT (OUTPATIENT)
Dept: HEMATOLOGY/ONCOLOGY | Facility: CLINIC | Age: 55
End: 2025-01-24
Payer: COMMERCIAL

## 2025-01-24 VITALS
SYSTOLIC BLOOD PRESSURE: 118 MMHG | TEMPERATURE: 98 F | HEIGHT: 71 IN | BODY MASS INDEX: 28.54 KG/M2 | OXYGEN SATURATION: 94 % | DIASTOLIC BLOOD PRESSURE: 71 MMHG | RESPIRATION RATE: 16 BRPM | HEART RATE: 63 BPM | WEIGHT: 203.88 LBS

## 2025-01-24 DIAGNOSIS — Z79.899 IMMUNODEFICIENCY DUE TO CHEMOTHERAPY: ICD-10-CM

## 2025-01-24 DIAGNOSIS — N18.32 STAGE 3B CHRONIC KIDNEY DISEASE: ICD-10-CM

## 2025-01-24 DIAGNOSIS — D84.821 IMMUNODEFICIENCY DUE TO CHEMOTHERAPY: ICD-10-CM

## 2025-01-24 DIAGNOSIS — E83.39 HYPOPHOSPHATEMIA: ICD-10-CM

## 2025-01-24 DIAGNOSIS — Z76.82 STEM CELL TRANSPLANT CANDIDATE: ICD-10-CM

## 2025-01-24 DIAGNOSIS — C90.00 MULTIPLE MYELOMA NOT HAVING ACHIEVED REMISSION: Primary | ICD-10-CM

## 2025-01-24 DIAGNOSIS — T45.1X5A IMMUNODEFICIENCY DUE TO CHEMOTHERAPY: ICD-10-CM

## 2025-01-24 PROCEDURE — 1159F MED LIST DOCD IN RCRD: CPT | Mod: CPTII,,, | Performed by: NURSE PRACTITIONER

## 2025-01-24 PROCEDURE — G2211 COMPLEX E/M VISIT ADD ON: HCPCS | Mod: S$PBB,,, | Performed by: NURSE PRACTITIONER

## 2025-01-24 PROCEDURE — 3078F DIAST BP <80 MM HG: CPT | Mod: CPTII,,, | Performed by: NURSE PRACTITIONER

## 2025-01-24 PROCEDURE — 3008F BODY MASS INDEX DOCD: CPT | Mod: CPTII,,, | Performed by: NURSE PRACTITIONER

## 2025-01-24 PROCEDURE — 3074F SYST BP LT 130 MM HG: CPT | Mod: CPTII,,, | Performed by: NURSE PRACTITIONER

## 2025-01-24 PROCEDURE — 99214 OFFICE O/P EST MOD 30 MIN: CPT | Mod: S$PBB,,, | Performed by: NURSE PRACTITIONER

## 2025-01-24 RX ORDER — BORTEZOMIB 3.5 MG/1
1.5 INJECTION, POWDER, LYOPHILIZED, FOR SOLUTION INTRAVENOUS; SUBCUTANEOUS
Status: CANCELLED | OUTPATIENT
Start: 2025-01-24

## 2025-01-24 RX ORDER — PALONOSETRON 0.05 MG/ML
0.25 INJECTION, SOLUTION INTRAVENOUS
Status: CANCELLED | OUTPATIENT
Start: 2025-01-24

## 2025-01-24 RX ORDER — HEPARIN 100 UNIT/ML
500 SYRINGE INTRAVENOUS
OUTPATIENT
Start: 2025-02-14

## 2025-01-24 RX ORDER — PALONOSETRON 0.05 MG/ML
0.25 INJECTION, SOLUTION INTRAVENOUS
OUTPATIENT
Start: 2025-02-07

## 2025-01-24 RX ORDER — SODIUM CHLORIDE 0.9 % (FLUSH) 0.9 %
10 SYRINGE (ML) INJECTION
OUTPATIENT
Start: 2025-02-07

## 2025-01-24 RX ORDER — HEPARIN 100 UNIT/ML
500 SYRINGE INTRAVENOUS
OUTPATIENT
Start: 2025-02-07

## 2025-01-24 RX ORDER — HEPARIN 100 UNIT/ML
500 SYRINGE INTRAVENOUS
Status: CANCELLED | OUTPATIENT
Start: 2025-01-24

## 2025-01-24 RX ORDER — PALONOSETRON 0.05 MG/ML
0.25 INJECTION, SOLUTION INTRAVENOUS
OUTPATIENT
Start: 2025-01-31

## 2025-01-24 RX ORDER — SODIUM CHLORIDE 0.9 % (FLUSH) 0.9 %
10 SYRINGE (ML) INJECTION
Status: CANCELLED | OUTPATIENT
Start: 2025-01-24

## 2025-01-24 RX ORDER — SODIUM CHLORIDE 0.9 % (FLUSH) 0.9 %
10 SYRINGE (ML) INJECTION
OUTPATIENT
Start: 2025-02-14

## 2025-01-24 RX ORDER — PALONOSETRON 0.05 MG/ML
0.25 INJECTION, SOLUTION INTRAVENOUS
OUTPATIENT
Start: 2025-02-14

## 2025-01-24 RX ORDER — BORTEZOMIB 3.5 MG/1
1.5 INJECTION, POWDER, LYOPHILIZED, FOR SOLUTION INTRAVENOUS; SUBCUTANEOUS
OUTPATIENT
Start: 2025-02-14

## 2025-01-24 RX ORDER — BORTEZOMIB 3.5 MG/1
1.5 INJECTION, POWDER, LYOPHILIZED, FOR SOLUTION INTRAVENOUS; SUBCUTANEOUS
OUTPATIENT
Start: 2025-02-07

## 2025-01-24 RX ORDER — SODIUM CHLORIDE 0.9 % (FLUSH) 0.9 %
10 SYRINGE (ML) INJECTION
OUTPATIENT
Start: 2025-01-31

## 2025-01-24 RX ORDER — HEPARIN 100 UNIT/ML
500 SYRINGE INTRAVENOUS
OUTPATIENT
Start: 2025-01-31

## 2025-01-24 RX ORDER — BORTEZOMIB 3.5 MG/1
1.5 INJECTION, POWDER, LYOPHILIZED, FOR SOLUTION INTRAVENOUS; SUBCUTANEOUS
OUTPATIENT
Start: 2025-01-31

## 2025-01-24 NOTE — PROGRESS NOTES
HEMATOLOGY FOLLOW UP CONSULTATION NOTE    Patient ID: Miky Carrasco is a 54 y.o. male.    Chief Complaint:  Multiple myeloma    HPI:  Patient is a 53-year-old male with past medical history of hyperlipidemia who recently was evaluated by Nephrology for worsening kidney function and underwent further lab work including serum protein electrophoresis which showed findings concerning for possible MGUS versus active multiple myeloma with decline in kidney.  Patient presents to our clinic today for further evaluation.  Voices no acute complaints. Denies bone pain, activity and appetite changes.              Past Medical History:   Diagnosis Date    Disorder of kidney and ureter     Multiple myeloma not having achieved remission        Family History   Problem Relation Name Age of Onset    Hypertension Mother      Diabetes Mother      Skin cancer Mother      Hypertension Father      Cancer Sister         Social History     Socioeconomic History    Marital status:    Tobacco Use    Smoking status: Every Day     Passive exposure: Current    Smokeless tobacco: Current     Types: Chew   Substance and Sexual Activity    Alcohol use: Not Currently    Drug use: Not Currently     Types: Marijuana    Sexual activity: Yes     Social Drivers of Health     Financial Resource Strain: Low Risk  (8/14/2024)    Overall Financial Resource Strain (CARDIA)     Difficulty of Paying Living Expenses: Not very hard   Food Insecurity: Unknown (8/14/2024)    Hunger Vital Sign     Worried About Running Out of Food in the Last Year: Never true   Physical Activity: Insufficiently Active (8/14/2024)    Exercise Vital Sign     Days of Exercise per Week: 2 days     Minutes of Exercise per Session: 30 min   Stress: Stress Concern Present (8/14/2024)    [employer] - Occupational Stress Questionnaire     Feeling of Stress : To some extent   Housing Stability: Unknown (8/14/2024)    Housing Stability Vital Sign      Unable to Pay for Housing in the Last Year: Patient declined         No past surgical history on file.      Review of systems:  Review of Systems   Constitutional:  Negative for activity change, appetite change, chills, diaphoresis, fatigue and unexpected weight change.   HENT:  Negative for congestion, facial swelling, hearing loss, mouth sores, trouble swallowing and voice change.    Eyes:  Negative for photophobia, pain, discharge and itching.   Respiratory:  Negative for apnea, cough, choking, chest tightness and shortness of breath.    Cardiovascular:  Negative for chest pain, palpitations and leg swelling.   Gastrointestinal:  Negative for abdominal distention, abdominal pain, anal bleeding and blood in stool.   Endocrine: Negative for cold intolerance, heat intolerance, polydipsia and polyphagia.   Genitourinary:  Negative for difficulty urinating, dysuria, flank pain and hematuria.   Musculoskeletal:  Negative for arthralgias, back pain, joint swelling, myalgias, neck pain and neck stiffness.   Skin:  Negative for color change, pallor and wound.   Allergic/Immunologic: Negative for environmental allergies, food allergies and immunocompromised state.   Neurological:  Negative for dizziness, seizures, facial asymmetry, speech difficulty, light-headedness, numbness and headaches.   Hematological:  Negative for adenopathy. Does not bruise/bleed easily.   Psychiatric/Behavioral:  Negative for agitation, behavioral problems, confusion, decreased concentration and sleep disturbance.                                Physical Exam  Vitals and nursing note reviewed.   Constitutional:       General: He is not in acute distress.     Appearance: Normal appearance. He is not ill-appearing.   HENT:      Head: Normocephalic and atraumatic.      Nose: No congestion or rhinorrhea.   Eyes:      General: No scleral icterus.     Extraocular Movements: Extraocular movements intact.      Pupils: Pupils are equal, round, and reactive  to light.   Cardiovascular:      Rate and Rhythm: Normal rate and regular rhythm.      Pulses: Normal pulses.      Heart sounds: Normal heart sounds. No murmur heard.     No gallop.   Pulmonary:      Effort: Pulmonary effort is normal. No respiratory distress.      Breath sounds: Normal breath sounds. No stridor. No wheezing or rhonchi.   Abdominal:      General: Bowel sounds are normal. There is no distension.      Palpations: There is no mass.      Tenderness: There is no abdominal tenderness. There is no guarding.   Musculoskeletal:         General: No swelling, tenderness, deformity or signs of injury. Normal range of motion.      Cervical back: Normal range of motion and neck supple. No rigidity. No muscular tenderness.      Right lower leg: No edema.      Left lower leg: No edema.   Skin:     General: Skin is warm.      Coloration: Skin is not jaundiced or pale.      Findings: No bruising or lesion.   Neurological:      General: No focal deficit present.      Mental Status: He is alert and oriented to person, place, and time.      Cranial Nerves: No cranial nerve deficit.      Sensory: No sensory deficit.      Motor: No weakness.      Gait: Gait normal.   Psychiatric:         Mood and Affect: Mood normal.         Behavior: Behavior normal.         Thought Content: Thought content normal.     There were no vitals filed for this visit.    There is no height or weight on file to calculate BSA.    Assessment/Plan:      ECOG 1    Multiple myeloma:    == reviewed prior lab work done with Nephrology.  Patient is noted to have worsening renal failure and also has presence of monoclonal band on immuno fixation studies.  I will initiate workup for multiple myeloma and will include a bone marrow biopsy as well as a PET scan to rule out any osteolytic lesions.  Discussed these findings in detail with patient giving him opportunity to ask questions  == 8/18/24:  Bone marrow biopsy done showed increased number of plasma  cells.  No percentage mentioned but reported to be more than normal and in the smoldering range.  With evidence of worsening renal failure along with elevated kappa free light chains and kappa/lambda free light chain ratio findings are consistent with active multiple myeloma.  Due to decline in kidney function my initial plan would be to initiate CyBorD.  Patient reports that he is on a job which requires lot of traveling and unable to come to clinics  regularly do receive his treatment.   ==9/23/24:  Patient and wife here today to discuss upcoming chemotherapy/immunotherapy. An extensive discussion was had which included a thorough discussion of potential side effect profile, risk versus benefits, and expected outcomes.  Risks, including but not limited to, possible hair loss, bone marrow damage, damage to body organs, allergic reactions, sterility, nausea/vomiting, constipation/diarrhea, sores in the mouth, secondary cancers, and rarely death were all discuss.  Specific side effects pertaining to their chemotherapy/immunotherapy medications were discussed as well.  The patient agrees with the plan and all questions and their support systems questions have been answered to their satisfaction.  Premedications were prescribed and patient was educated on appropriate usage.  Patient was educated on when to call, and given the number to call, or to notify the provider including but not limited to:  Persistent nausea and vomiting, dehydration, fever greater than 100.4 lasting over 1 hour induration or isolated feeders greater than 101, rash while on active chemotherapy or immunotherapy, severe pain or new onset pain not controlled by current pain medication regimen.   ==10/04/2024: Tolerating chemotherapy well, pt has leg cramps has had all of his life worse at night, potassium mg normal, discussed medication, pt would like to try otc topicals such as biofreeze or icyhot with lidocaine first and if not helpful will  notify clinic and will send our rx.  No other complaints, will continue chemotherapy, plan to see next week and if continues to tolerate well plan to see every other week.  ==10/11/2024: Magnesium wnl, but hypophosphatemia noted, pt denies diarrhea or antacid use.  Will start gentle supplementation with po multivitamin, discussed with patient his renal function and may need po phosphate if repeat labs show continued low phosphorus, but black box warning in CKD for po phosphate.  Will try multivitamin containing phos as well as increasing dietary intake - reviewed dietary sources of phosphorus.  Will also check vitamin D level, Vit D deficiency can cause/worsen hypophosphatemia and recheck phos level to determine if transient.  Pt is asymptomatic at this time has occasional leg cramps for all of his life, no increase or other symptoms.  Labs reviewed will continue chemotherapy   ==11/22/2024: Phos wnl, tolerating Cybor D well, leg cramps improved with baclofen, labs reviewed patient is cleared for chemotherapy. Will now also make referral to BMT team in [place].   ==12/27/2024: Has appt with Dr. Ferrer 1/16/2025, tolerating CyBorD well.  May use labs from BMT appt on 1/16/25 for chemo scheduled on 1/17/2024, will need cbc cmp. Denies complaints, will continue with CyBorD.  ==01/24/2025: recently saw Dr. Ferrer and plan to continue CyBorD at this time, will see again end of February. Tolerating well, labs reviewed, patient is cleared for chemotherapy    2. Prophylaxis for infection prevention  == Continue Bactrim and acyclovir    3. Hypophosphatemia  ==Asymptomatic, normal magnesium level - pt has had leg cramps all of life, no increase previous phos levels wnl  ==Recheck to determine if transient, avoid antacids, check vitamin D level  ==encourage po intake, will start multivitamin containing phosphorus, may need higher dose if does not resolve, will prescribe with caution given renal function  == 11/22/24:  Resolved    Plan:  Continue  CyBorD -   weekly treatment with labs: Cbc cmp q weekly   NP appt in 4 weeks  Continue Bactrim and valtrex prophylaxis  Keep appt with BMT team in [place]  Myeloma labs q 4 weeks   Vitamin D, avoid antacids, diuretics, mag is normal avoid etoh, meat fish poultry, nuts and cereal        Total time spent in counseling and discussion about further management options including relevant lab work, treatment,  prognosis, medications and intended side effects was more than 30 minutes. More than 50 % of the time was spent in counseling and coordination of care.  This includes face to face time and non-face to face time preparing to see the patient (eg, review of tests), Obtaining and/or reviewing separately obtained history, Documenting clinical information in the electronic or other health record, Independently interpreting resultsand communicating results to the patient/family/caregiver, or Care coordination.

## 2025-01-28 ENCOUNTER — LAB VISIT (OUTPATIENT)
Dept: LAB | Facility: HOSPITAL | Age: 55
End: 2025-01-28
Attending: INTERNAL MEDICINE
Payer: COMMERCIAL

## 2025-01-28 DIAGNOSIS — C90.00 MULTIPLE MYELOMA NOT HAVING ACHIEVED REMISSION: ICD-10-CM

## 2025-01-28 LAB
ALBUMIN, ELECTROPHORESIS: 4.72 G/DL (ref 3.75–5.01)
ALPHA1 GLOB FLD ELPH-MCNC: 0.25 G/DL (ref 0.19–0.46)
ALPHA2 GLOB FLD ELPH-MCNC: 0.66 G/DL (ref 0.48–1.05)
B-GLOBULIN FLD ELPH-MCNC: 0.64 G/DL (ref 0.48–1.1)
EER PROTEIN ELECTROPHORESIS, SERUM: ABNORMAL
GAMMA GLOB FLD ELPH-MCNC: 0.43 G/DL (ref 0.62–1.51)
IGA SERPL-MCNC: 31 MG/DL (ref 68–408)
IGG SERPL-MCNC: 515 MG/DL (ref 768–1632)
IGM: <20 MG/DL (ref 35–263)
INTERPRETATION SERPL IFE-IMP: ABNORMAL
KAPPA FREE LIGHT CHAINS: 349.52 MG/L (ref 3.3–19.4)
KAPPA/LAMBDA FLC RATIO: 51.93 (ref 0.26–1.65)
LAMBDA LIGHT CHAIN, FREE, SERUM: 6.73 MG/L (ref 5.71–26.3)
MONOCLONAL PROTEIN: ABNORMAL G/DL
TOTAL PROTEIN, ELECTROPHORESIS: 6.7 G/DL (ref 6.3–8.2)

## 2025-01-28 PROCEDURE — 88325 CONSLTJ COMPRE RVW REC REPRT: CPT | Mod: ,,, | Performed by: PATHOLOGY

## 2025-01-28 PROCEDURE — 88325 CONSLTJ COMPRE RVW REC REPRT: CPT | Performed by: PATHOLOGY

## 2025-02-21 ENCOUNTER — OFFICE VISIT (OUTPATIENT)
Dept: HEMATOLOGY/ONCOLOGY | Facility: CLINIC | Age: 55
End: 2025-02-21
Payer: COMMERCIAL

## 2025-02-21 VITALS
OXYGEN SATURATION: 95 % | SYSTOLIC BLOOD PRESSURE: 133 MMHG | WEIGHT: 206.69 LBS | TEMPERATURE: 98 F | DIASTOLIC BLOOD PRESSURE: 82 MMHG | HEART RATE: 67 BPM | HEIGHT: 71 IN | BODY MASS INDEX: 28.94 KG/M2 | RESPIRATION RATE: 16 BRPM

## 2025-02-21 DIAGNOSIS — Z76.82 STEM CELL TRANSPLANT CANDIDATE: ICD-10-CM

## 2025-02-21 DIAGNOSIS — Z79.899 IMMUNODEFICIENCY DUE TO CHEMOTHERAPY: ICD-10-CM

## 2025-02-21 DIAGNOSIS — T45.1X5A IMMUNODEFICIENCY DUE TO CHEMOTHERAPY: ICD-10-CM

## 2025-02-21 DIAGNOSIS — C90.00 MULTIPLE MYELOMA NOT HAVING ACHIEVED REMISSION: Primary | ICD-10-CM

## 2025-02-21 DIAGNOSIS — D84.821 IMMUNODEFICIENCY DUE TO CHEMOTHERAPY: ICD-10-CM

## 2025-02-21 RX ORDER — PALONOSETRON 0.05 MG/ML
0.25 INJECTION, SOLUTION INTRAVENOUS
OUTPATIENT
Start: 2025-03-14

## 2025-02-21 RX ORDER — BORTEZOMIB 3.5 MG/1
1.5 INJECTION, POWDER, LYOPHILIZED, FOR SOLUTION INTRAVENOUS; SUBCUTANEOUS
OUTPATIENT
Start: 2025-02-21

## 2025-02-21 RX ORDER — PALONOSETRON 0.05 MG/ML
0.25 INJECTION, SOLUTION INTRAVENOUS
OUTPATIENT
Start: 2025-02-28

## 2025-02-21 RX ORDER — PALONOSETRON 0.05 MG/ML
0.25 INJECTION, SOLUTION INTRAVENOUS
OUTPATIENT
Start: 2025-02-21

## 2025-02-21 RX ORDER — HEPARIN 100 UNIT/ML
500 SYRINGE INTRAVENOUS
OUTPATIENT
Start: 2025-02-28

## 2025-02-21 RX ORDER — SODIUM CHLORIDE 0.9 % (FLUSH) 0.9 %
10 SYRINGE (ML) INJECTION
OUTPATIENT
Start: 2025-02-21

## 2025-02-21 RX ORDER — BORTEZOMIB 3.5 MG/1
1.5 INJECTION, POWDER, LYOPHILIZED, FOR SOLUTION INTRAVENOUS; SUBCUTANEOUS
OUTPATIENT
Start: 2025-02-28

## 2025-02-21 RX ORDER — SODIUM CHLORIDE 0.9 % (FLUSH) 0.9 %
10 SYRINGE (ML) INJECTION
OUTPATIENT
Start: 2025-03-07

## 2025-02-21 RX ORDER — HEPARIN 100 UNIT/ML
500 SYRINGE INTRAVENOUS
OUTPATIENT
Start: 2025-03-14

## 2025-02-21 RX ORDER — SODIUM CHLORIDE 0.9 % (FLUSH) 0.9 %
10 SYRINGE (ML) INJECTION
OUTPATIENT
Start: 2025-03-14

## 2025-02-21 RX ORDER — SODIUM CHLORIDE 0.9 % (FLUSH) 0.9 %
10 SYRINGE (ML) INJECTION
OUTPATIENT
Start: 2025-02-28

## 2025-02-21 RX ORDER — HEPARIN 100 UNIT/ML
500 SYRINGE INTRAVENOUS
OUTPATIENT
Start: 2025-03-07

## 2025-02-21 RX ORDER — BORTEZOMIB 3.5 MG/1
1.5 INJECTION, POWDER, LYOPHILIZED, FOR SOLUTION INTRAVENOUS; SUBCUTANEOUS
OUTPATIENT
Start: 2025-03-14

## 2025-02-21 RX ORDER — BORTEZOMIB 3.5 MG/1
1.5 INJECTION, POWDER, LYOPHILIZED, FOR SOLUTION INTRAVENOUS; SUBCUTANEOUS
OUTPATIENT
Start: 2025-03-07

## 2025-02-21 RX ORDER — HEPARIN 100 UNIT/ML
500 SYRINGE INTRAVENOUS
OUTPATIENT
Start: 2025-02-21

## 2025-02-21 RX ORDER — PALONOSETRON 0.05 MG/ML
0.25 INJECTION, SOLUTION INTRAVENOUS
OUTPATIENT
Start: 2025-03-07

## 2025-02-21 NOTE — PROGRESS NOTES
HEMATOLOGY FOLLOW UP CONSULTATION NOTE    Patient ID: Miky Carrasco is a 54 y.o. male.    Chief Complaint:  Multiple myeloma    HPI:  Patient is a 53-year-old male with past medical history of hyperlipidemia who recently was evaluated by Nephrology for worsening kidney function and underwent further lab work including serum protein electrophoresis which showed findings concerning for possible MGUS versus active multiple myeloma with decline in kidney.  Patient presents to our clinic today for further evaluation.  Voices no acute complaints. Denies bone pain, activity and appetite changes.              Past Medical History:   Diagnosis Date    Disorder of kidney and ureter     Multiple myeloma not having achieved remission        Family History   Problem Relation Name Age of Onset    Hypertension Mother      Diabetes Mother      Skin cancer Mother      Hypertension Father      Cancer Sister         Social History     Socioeconomic History    Marital status:    Tobacco Use    Smoking status: Every Day     Passive exposure: Current    Smokeless tobacco: Current     Types: Chew   Substance and Sexual Activity    Alcohol use: Not Currently    Drug use: Not Currently     Types: Marijuana    Sexual activity: Yes     Social Drivers of Health     Financial Resource Strain: Low Risk  (2/20/2025)    Overall Financial Resource Strain (CARDIA)     Difficulty of Paying Living Expenses: Not hard at all   Food Insecurity: No Food Insecurity (2/20/2025)    Hunger Vital Sign     Worried About Running Out of Food in the Last Year: Never true     Ran Out of Food in the Last Year: Never true   Transportation Needs: No Transportation Needs (2/20/2025)    PRAPARE - Transportation     Lack of Transportation (Medical): No     Lack of Transportation (Non-Medical): No   Physical Activity: Insufficiently Active (2/20/2025)    Exercise Vital Sign     Days of Exercise per Week: 1 day     Minutes of Exercise per Session: 30 min    Stress: No Stress Concern Present (2/20/2025)    [employer] - Occupational Stress Questionnaire     Feeling of Stress : Only a little   Housing Stability: Low Risk  (2/20/2025)    Housing Stability Vital Sign     Unable to Pay for Housing in the Last Year: No     Number of Times Moved in the Last Year: 0     Homeless in the Last Year: No         History reviewed. No pertinent surgical history.      Review of systems:  Review of Systems   Constitutional:  Negative for activity change, appetite change, chills, diaphoresis, fatigue and unexpected weight change.   HENT:  Negative for congestion, facial swelling, hearing loss, mouth sores, trouble swallowing and voice change.    Eyes:  Negative for photophobia, pain, discharge and itching.   Respiratory:  Negative for apnea, cough, choking, chest tightness and shortness of breath.    Cardiovascular:  Negative for chest pain, palpitations and leg swelling.   Gastrointestinal:  Negative for abdominal distention, abdominal pain, anal bleeding and blood in stool.   Endocrine: Negative for cold intolerance, heat intolerance, polydipsia and polyphagia.   Genitourinary:  Negative for difficulty urinating, dysuria, flank pain and hematuria.   Musculoskeletal:  Negative for arthralgias, back pain, joint swelling, myalgias, neck pain and neck stiffness.   Skin:  Negative for color change, pallor and wound.   Allergic/Immunologic: Negative for environmental allergies, food allergies and immunocompromised state.   Neurological:  Negative for dizziness, seizures, facial asymmetry, speech difficulty, light-headedness, numbness and headaches.   Hematological:  Negative for adenopathy. Does not bruise/bleed easily.   Psychiatric/Behavioral:  Negative for agitation, behavioral problems, confusion, decreased concentration and sleep disturbance.                                Physical Exam  Vitals and nursing note reviewed.   Constitutional:       General:  He is not in acute distress.     Appearance: Normal appearance. He is not ill-appearing.   HENT:      Head: Normocephalic and atraumatic.      Nose: No congestion or rhinorrhea.   Eyes:      General: No scleral icterus.     Extraocular Movements: Extraocular movements intact.      Pupils: Pupils are equal, round, and reactive to light.   Cardiovascular:      Rate and Rhythm: Normal rate and regular rhythm.      Pulses: Normal pulses.      Heart sounds: Normal heart sounds. No murmur heard.     No gallop.   Pulmonary:      Effort: Pulmonary effort is normal. No respiratory distress.      Breath sounds: Normal breath sounds. No stridor. No wheezing or rhonchi.   Abdominal:      General: Bowel sounds are normal. There is no distension.      Palpations: There is no mass.      Tenderness: There is no abdominal tenderness. There is no guarding.   Musculoskeletal:         General: No swelling, tenderness, deformity or signs of injury. Normal range of motion.      Cervical back: Normal range of motion and neck supple. No rigidity. No muscular tenderness.      Right lower leg: No edema.      Left lower leg: No edema.   Skin:     General: Skin is warm.      Coloration: Skin is not jaundiced or pale.      Findings: No bruising or lesion.   Neurological:      General: No focal deficit present.      Mental Status: He is alert and oriented to person, place, and time.      Cranial Nerves: No cranial nerve deficit.      Sensory: No sensory deficit.      Motor: No weakness.      Gait: Gait normal.   Psychiatric:         Mood and Affect: Mood normal.         Behavior: Behavior normal.         Thought Content: Thought content normal.     Vitals:    02/21/25 1031   BP: 133/82   Pulse: 67   Resp: 16   Temp: 98.4 °F (36.9 °C)       Body surface area is 2.17 meters squared.    Assessment/Plan:      ECOG 1    Multiple myeloma:    == reviewed prior lab work done with Nephrology.  Patient is noted to have worsening renal failure and also has  presence of monoclonal band on immuno fixation studies.  I will initiate workup for multiple myeloma and will include a bone marrow biopsy as well as a PET scan to rule out any osteolytic lesions.  Discussed these findings in detail with patient giving him opportunity to ask questions  == 8/18/24:  Bone marrow biopsy done showed increased number of plasma cells.  No percentage mentioned but reported to be more than normal and in the smoldering range.  With evidence of worsening renal failure along with elevated kappa free light chains and kappa/lambda free light chain ratio findings are consistent with active multiple myeloma.  Due to decline in kidney function my initial plan would be to initiate CyBorD.  Patient reports that he is on a job which requires lot of traveling and unable to come to clinics  regularly do receive his treatment.   ==9/23/24:  Patient and wife here today to discuss upcoming chemotherapy/immunotherapy. An extensive discussion was had which included a thorough discussion of potential side effect profile, risk versus benefits, and expected outcomes.  Risks, including but not limited to, possible hair loss, bone marrow damage, damage to body organs, allergic reactions, sterility, nausea/vomiting, constipation/diarrhea, sores in the mouth, secondary cancers, and rarely death were all discuss.  Specific side effects pertaining to their chemotherapy/immunotherapy medications were discussed as well.  The patient agrees with the plan and all questions and their support systems questions have been answered to their satisfaction.  Premedications were prescribed and patient was educated on appropriate usage.  Patient was educated on when to call, and given the number to call, or to notify the provider including but not limited to:  Persistent nausea and vomiting, dehydration, fever greater than 100.4 lasting over 1 hour induration or isolated feeders greater than 101, rash while on active chemotherapy  or immunotherapy, severe pain or new onset pain not controlled by current pain medication regimen.   ==10/04/2024: Tolerating chemotherapy well, pt has leg cramps has had all of his life worse at night, potassium mg normal, discussed medication, pt would like to try otc topicals such as biofreeze or icyhot with lidocaine first and if not helpful will notify clinic and will send our rx.  No other complaints, will continue chemotherapy, plan to see next week and if continues to tolerate well plan to see every other week.  ==10/11/2024: Magnesium wnl, but hypophosphatemia noted, pt denies diarrhea or antacid use.  Will start gentle supplementation with po multivitamin, discussed with patient his renal function and may need po phosphate if repeat labs show continued low phosphorus, but black box warning in CKD for po phosphate.  Will try multivitamin containing phos as well as increasing dietary intake - reviewed dietary sources of phosphorus.  Will also check vitamin D level, Vit D deficiency can cause/worsen hypophosphatemia and recheck phos level to determine if transient.  Pt is asymptomatic at this time has occasional leg cramps for all of his life, no increase or other symptoms.  Labs reviewed will continue chemotherapy   ==11/22/2024: Phos wnl, tolerating Cybor D well, leg cramps improved with baclofen, labs reviewed patient is cleared for chemotherapy. Will now also make referral to BMT team in [place].   ==12/27/2024: Has appt with Dr. Cardoso 1/16/2025, tolerating CyBorD well.  May use labs from BMT appt on 1/16/25 for chemo scheduled on 1/17/2024, will need cbc cmp. Denies complaints, will continue with CyBorD.  ==02/21/2025: Tolerating CyBorD well, next appt with Dr. Cardoso is on 2/27/2025 for transplant eval. Last myeloma labs were on 1/24/25 so I will have drawn today so they will be resulted for his visit next week. Whole body PET ct reviewed which showed bony degenerative changes without evidence of  bony destruction, no hypermetabolic activity seen and no interval changes from prior study.  Will continue CyBorD at this time.     2. Prophylaxis for infection prevention  == Continue Bactrim and acyclovir    3. Hypophosphatemia  ==Asymptomatic, normal magnesium level - pt has had leg cramps all of life, no increase previous phos levels wnl  ==Recheck to determine if transient, avoid antacids, check vitamin D level  ==encourage po intake, will start multivitamin containing phosphorus, may need higher dose if does not resolve, will prescribe with caution given renal function  == 11/22/24: Resolved    Plan:  Continue  CyBorD -   weekly treatment with labs: Cbc cmp q weekly   NP appt in 4 weeks  Continue Bactrim and valtrex prophylaxis  Keep appt with BMT team in [place]  Myeloma labs q 4 weeks   Vitamin D, avoid antacids, diuretics, mag is normal avoid etoh, meat fish poultry, nuts and cereal        Total time spent in counseling and discussion about further management options including relevant lab work, treatment,  prognosis, medications and intended side effects was more than 30 minutes. More than 50 % of the time was spent in counseling and coordination of care.  This includes face to face time and non-face to face time preparing to see the patient (eg, review of tests), Obtaining and/or reviewing separately obtained history, Documenting clinical information in the electronic or other health record, Independently interpreting resultsand communicating results to the patient/family/caregiver, or Care coordination.

## 2025-02-26 NOTE — PROGRESS NOTES
Section of Hematology and Stem Cell Transplantation  Follow Up     The patient location is: [place], [place]  The chief complaint leading to consultation is: Multiple myeloma   Visit type: audiovisual  Face to Face time with patient: 15 minutes  40 minutes of total time spent on the encounter, which includes face to face time and non-face to face time preparing to see the patient (eg, review of tests), Obtaining and/or reviewing separately obtained history, Documenting clinical information in the electronic or other health record, Independently interpreting results (not separately reported) and communicating results to the patient/family/caregiver, or Care coordination (not separately reported).   Each patient to whom he or she provides medical services by telemedicine is:  (1) informed of the relationship between the physician and patient and the respective role of any other health care provider with respect to management of the patient; and (2) notified that he or she may decline to receive medical services by telemedicine and may withdraw from such care at any time.    Notes:     Date of visit: 2/27/25  Visit diagnosis: Stem cell transplant candidate [Z76.82]    Oncologic History:     Primary Oncologic Diagnosis: [place] free light chain restricted multiple myeloma    7/2024: First labs in [facility] system showing renal dysfunction with creatinine 1.9. He was referred to Dr. Saunders for further evaluation.   7/2024: Baseline myeloma labs - K , L FLC 8.10, FLC ratio 86; IgG 881, IgA 61, IgM 24; ; B2M 3.6; Cr 1.95, Ca 9.2, Hgb 17.6.  8/2/24: Bone marrow biopsy showed increased number of plasma cells (20-25%). [place] red negative. Diploid karyotype. FISH not available.   9/27/24: Cycle 1 day 1 of CyBorD.  12/20/24: Bone marrow biopsy revealed persistent plasma cell neoplasm (30%). FISH normal.   12/27/24: Cycle 4 day 1 of CyBorD.    History of Present Ilness:   Miky Carrasco (Miky) is a pleasant 54  y.o.male with multiple myeloma who presents for follow up. He is doing well. No new complaints today.     PAST MEDICAL HISTORY:   Past Medical History:   Diagnosis Date    Disorder of kidney and ureter     Multiple myeloma not having achieved remission        PAST SURGICAL HISTORY:   History reviewed. No pertinent surgical history.    PAST SOCIAL HISTORY:  Social History     Tobacco Use    Smoking status: Every Day     Passive exposure: Current    Smokeless tobacco: Current     Types: Chew   Substance Use Topics    Alcohol use: Not Currently    Drug use: Not Currently     Types: Marijuana       FAMILY HISTORY:  Family History   Problem Relation Name Age of Onset    Hypertension Mother      Diabetes Mother      Skin cancer Mother      Hypertension Father      Cancer Sister         CURRENT MEDICATIONS:   Current Outpatient Medications   Medication Sig    acyclovir (ZOVIRAX) 400 MG tablet Take 1 tablet (400 mg total) by mouth once daily.    cycloPHOSphamide (CYTOXAN) 50 mg capsule Take 12 capsules (600 mg total) by mouth every 7 days on days 1, 8, 15 and 22 of each 28 day cycle. Take with lots of water. with 1 other cycloPHOSphamide prescription for 625 mg total.    cycloPHOSphamide 25 mg Cap Take 1 capsule (25 mg total) by mouth every 7 days on days 1, 8, 15 and 22 of each 28 day cycle. Take with lots of water. with 1 other cycloPHOSphamide prescription for 625 mg total.    dexAMETHasone (DECADRON) 4 MG Tab Take 10 tablets (40 mg total) by mouth every 7 days. on days 1, 8, 15, and 22 of each chemotherapy cycle. Take with food.    sulfamethoxazole-trimethoprim 400-80mg (BACTRIM) 400-80 mg per tablet Take 1 tablet by mouth once daily. for 360 doses     No current facility-administered medications for this visit.       ALLERGIES:   Review of patient's allergies indicates:   Allergen Reactions    Penicillins Rash    Baclofen Other (See Comments)     Can't sit still, causes body to twitch       Review of Systems:      Pertinent positives and negatives included in the HPI. Otherwise a complete review of systems is negative.    Physical Exam:     There were no vitals filed for this visit.      ECOG Performance Status: (foot note - ECOG PS provided by Eastern Cooperative Oncology Group) 0 - Asymptomatic    Karnofsky Performance Score:  100%- Normal, No Complaints, No Evidence of Disease    Labs:   Lab Results   Component Value Date    WBC 7.0 02/27/2025    RBC 4.69 01/16/2025    HGB 14.5 02/27/2025    HCT 41.8 (L) 02/27/2025    MCV 96.1 02/27/2025    MCH 31.8 (H) 01/16/2025    MCHC 34.0 01/16/2025    RDW 14.4 01/16/2025     01/16/2025    MPV 11.3 01/16/2025    GRAN 4.6 01/16/2025    GRAN 65.0 01/16/2025    LYMPH 1.5 01/16/2025    LYMPH 20.9 01/16/2025    MONO 0.7 01/16/2025    MONO 9.8 01/16/2025    EOS 0.1 01/16/2025    BASO 0.04 01/16/2025    EOSINOPHIL 1.7 01/16/2025    BASOPHIL 0.6 01/16/2025       CMP  Sodium   Date Value Ref Range Status   02/27/2025 137 135 - 145 mmol/L Final     Potassium   Date Value Ref Range Status   02/27/2025 4.4 3.6 - 5.2 mmol/L Final     Chloride   Date Value Ref Range Status   02/27/2025 101 100 - 108 mmol/L Final     CO2   Date Value Ref Range Status   02/27/2025 26 21 - 32 mmol/L Final     Glucose   Date Value Ref Range Status   02/27/2025 191 (H) 70 - 110 mg/dL Final     BUN   Date Value Ref Range Status   02/27/2025 20 (H) 7 - 18 mg/dL Final     Creatinine   Date Value Ref Range Status   02/27/2025 1.81 (H) 0.70 - 1.30 mg/dL Final     Calcium   Date Value Ref Range Status   02/27/2025 8.5 (L) 8.8 - 10.5 mg/dL Final     Total Protein   Date Value Ref Range Status   02/27/2025 6.3 (L) 6.4 - 8.2 g/dL Final     Albumin   Date Value Ref Range Status   02/27/2025 3.4 3.4 - 5.0 g/dL Final     Total Bilirubin   Date Value Ref Range Status   02/27/2025 0.4 0.0 - 1.0 mg/dL Final     Alkaline Phosphatase   Date Value Ref Range Status   02/27/2025 101 46 - 116 U/L Final     AST   Date Value Ref  Range Status   02/27/2025 16 15 - 37 U/L Final     Comment:     specimen grossly lipemic     ALT   Date Value Ref Range Status   02/27/2025 41 12 - 78 U/L Final     Comment:     specimen grossly lipemic     Anion Gap   Date Value Ref Range Status   02/27/2025 10.0 3.0 - 11.0 mmol/L Final         Pathology:  Reviewed     Assessment and Plan:   Miky Carrasco (Miky) is a pleasant 54 y.o.male with multiple myeloma here to discuss transplant.     Autologous stem cell transplant candidate:  We discussed the role for autologous stem cell transplantation in multiple myeloma as a means to improve progression free survival (not curative) and provide prolonged disease control. We had an extensive discussion about the rationale, logistics, risks, and benefits. We reviewed the requirement to stay in the [place] area for 30 days with a caregiver at all times. We discussed the risks, including infection, organ toxicity, relapse of disease, and secondary cancers. We reviewed the need for maintenance therapy after day 100. Ideally we will move forward with transplant ASAP depending on response (VGPR or CR) - at this time his FLC ratio remains elevated and bone marrow biopsy with persistent myeloma.   Coordinator: Kayla Ewing  Conditioning Regimen: Melphalan 200 mg/m2  Cell Dose: 5-10 x 10^6 CD34/kg    Kappa FLC restricted multiple myeloma, R-ISS stage II:  Patient was referred to hematology oncology in 7/2024 due to worsening renal function. He was worked up for multple myeloma and his bone marrow biopsy on 8/2/24 showed increased number of plasma cells (20-25%). His marrow and the evidence of worsening renal failure, along with elevated kappa free light chains and kappa/lambda free light chain ratio findings were consistent with active multiple myeloma. He is currently on cycle 6 of CyBorD.  - he has stable disease/partial response after 6 cycles of CyBorD. I would recommend changing therapy to deepen response in order to  proceed with transplant. Will discuss with Dr. Meredith.     Immunodeficiency   Patient is currently on bactrim and acyclovir    Orders/Follow Up:           Route Chart for Scheduling    BMT Chart Routing      Follow up with physician 2 months. virtual visit with Dr. Meredith next available. virtual visit with me in 2-3 months (Thurs afternoon)   Follow up with PATRICIA    Provider visit type    Infusion scheduling note    Injection scheduling note    Labs    Imaging    Pharmacy appointment    Other referrals                  Treatment Plan Information   OP CyBorD: CYCLOPHOSPHAMIDE PO + ONCE WEEKLY BORTEZOMIB + DEXAMETHASONE Q4W  El Saunders MD   Associated diagnosis: Multiple myeloma not having achieved remission  Beta-2-microglobulin (mg/L): 3.6, Albumin (g/dL): 4.1, ISS: Stage II, High-risk cytogenetics: Absent, LDH: Normal noted on 8/15/2024   Line of treatment: First Line  Treatment Goal: Curative     Upcoming Treatment Dates - OP CyBorD: CYCLOPHOSPHAMIDE PO + ONCE WEEKLY BORTEZOMIB + DEXAMETHASONE Q4W     2/28/2025       Pre-Medications       palonosetron injection 0.25 mg       Chemotherapy       bortezomib (VELCADE) injection 3.25 mg  3/7/2025       Pre-Medications       palonosetron injection 0.25 mg       Chemotherapy       bortezomib (VELCADE) injection 3.25 mg  3/14/2025       Pre-Medications       palonosetron injection 0.25 mg       Chemotherapy       bortezomib (VELCADE) injection 3.25 mg        Total time of this visit was 40 minutes, including time spent face to face with patient and/or via video/audio, and also in preparing for today's visit for MDM and documentation. (Medical Decision Making, including consideration of possible diagnoses, management options, complex medical record review, review of diagnostic tests and information, consideration and discussion of significant complications based on comorbidities, and discussion with providers involved with the care of the patient). Greater than 50%  was spent face to face with the patient counseling and coordinating care.    Edson Cardoso MD  Malignant Hematology, Stem Cell Transplant, and Cellular Therapy  [facility]  [facility] [place]

## 2025-02-27 ENCOUNTER — OFFICE VISIT (OUTPATIENT)
Dept: HEMATOLOGY/ONCOLOGY | Facility: CLINIC | Age: 55
End: 2025-02-27
Payer: COMMERCIAL

## 2025-02-27 DIAGNOSIS — C90.00 MULTIPLE MYELOMA NOT HAVING ACHIEVED REMISSION: ICD-10-CM

## 2025-02-27 DIAGNOSIS — Z76.82 STEM CELL TRANSPLANT CANDIDATE: Primary | ICD-10-CM

## 2025-02-27 DIAGNOSIS — D84.81 IMMUNODEFICIENCY DUE TO CONDITIONS CLASSIFIED ELSEWHERE: ICD-10-CM

## 2025-02-27 LAB
ALBUMIN SERPL BCP-MCNC: 3.4 G/DL (ref 3.4–5)
ALBUMIN/GLOBULIN RATIO: 1.17 RATIO (ref 1.1–1.8)
ALP SERPL-CCNC: 101 U/L (ref 46–116)
ALT SERPL W P-5'-P-CCNC: 41 U/L (ref 12–78)
ANION GAP SERPL CALC-SCNC: 10 MMOL/L (ref 3–11)
AST SERPL-CCNC: 16 U/L (ref 15–37)
BASOPHILS NFR BLD: 0.6 % (ref 0–3)
BILIRUB SERPL-MCNC: 0.4 MG/DL (ref 0–1)
BUN SERPL-MCNC: 20 MG/DL (ref 7–18)
BUN/CREAT SERPL: 11.04 RATIO (ref 7–18)
CALCIUM SERPL-MCNC: 8.5 MG/DL (ref 8.8–10.5)
CHLORIDE SERPL-SCNC: 101 MMOL/L (ref 100–108)
CO2 SERPL-SCNC: 26 MMOL/L (ref 21–32)
CREAT SERPL-MCNC: 1.81 MG/DL (ref 0.7–1.3)
EOSINOPHIL NFR BLD: 2.2 % (ref 1–3)
ERYTHROCYTE [DISTWIDTH] IN BLOOD BY AUTOMATED COUNT: 13.2 % (ref 12.5–18)
GFR ESTIMATION: 44
GLOBULIN: 2.9 G/DL (ref 2.3–3.5)
GLUCOSE SERPL-MCNC: 191 MG/DL (ref 70–110)
HCT VFR BLD AUTO: 41.8 % (ref 42–52)
HGB BLD-MCNC: 14.5 G/DL (ref 14–18)
LYMPHOCYTES NFR BLD: 16.8 % (ref 25–40)
MAGNESIUM SERPL-MCNC: 1.8 MG/DL (ref 1.8–2.4)
MCH RBC QN AUTO: 33.3 PG (ref 27–31.2)
MCHC RBC AUTO-ENTMCNC: 34.7 G/DL (ref 31.8–35.4)
MCV RBC AUTO: 96.1 FL (ref 80–97)
MONOCYTES NFR BLD: 9.3 % (ref 1–15)
NEUTROPHILS # BLD AUTO: 4.8 10*3/UL (ref 1.8–7.7)
NEUTROPHILS NFR BLD: 68.9 % (ref 37–80)
NUCLEATED RED BLOOD CELLS: 0 %
PHOSPHATE FLD-MCNC: 2.1 MG/DL (ref 2.6–4.7)
PLATELETS: 194 10*3/UL (ref 142–424)
POTASSIUM SERPL-SCNC: 4.4 MMOL/L (ref 3.6–5.2)
PROT SERPL-MCNC: 6.3 G/DL (ref 6.4–8.2)
RBC # BLD AUTO: 4.35 10*6/UL (ref 4.7–6.1)
SODIUM BLD-SCNC: 137 MMOL/L (ref 135–145)
WBC # BLD: 7 10*3/UL (ref 4.6–10.2)

## 2025-02-27 NOTE — Clinical Note
Hey y'all, Looks like he's had SD/NC from 6 cycles of CyBorD. He looks great. I think it may be time to change therapy to a Dina based regimen. Possibly D-Kd or Dina-VRD? Just wanted to get everyone's thoughts. We can be ready to go with transplant as soon as we get more of a response. Thanks. Edson

## 2025-02-28 ENCOUNTER — PATIENT MESSAGE (OUTPATIENT)
Dept: HEMATOLOGY/ONCOLOGY | Facility: CLINIC | Age: 55
End: 2025-02-28
Payer: COMMERCIAL

## 2025-03-04 ENCOUNTER — TELEPHONE (OUTPATIENT)
Dept: HEMATOLOGY/ONCOLOGY | Facility: CLINIC | Age: 55
End: 2025-03-04
Payer: COMMERCIAL

## 2025-03-06 ENCOUNTER — OFFICE VISIT (OUTPATIENT)
Dept: HEMATOLOGY/ONCOLOGY | Facility: CLINIC | Age: 55
End: 2025-03-06
Payer: COMMERCIAL

## 2025-03-06 DIAGNOSIS — Z76.82 STEM CELL TRANSPLANT CANDIDATE: ICD-10-CM

## 2025-03-06 DIAGNOSIS — Z91.09 ENVIRONMENTAL ALLERGIES: ICD-10-CM

## 2025-03-06 DIAGNOSIS — C90.00 MULTIPLE MYELOMA NOT HAVING ACHIEVED REMISSION: Primary | ICD-10-CM

## 2025-03-06 RX ORDER — FLUTICASONE PROPIONATE 50 MCG
1 SPRAY, SUSPENSION (ML) NASAL DAILY
Qty: 18.2 G | Refills: 3 | Status: SHIPPED | OUTPATIENT
Start: 2025-03-06

## 2025-03-06 RX ORDER — CETIRIZINE HYDROCHLORIDE 10 MG/1
10 TABLET ORAL DAILY
Qty: 90 TABLET | Refills: 3 | Status: SHIPPED | OUTPATIENT
Start: 2025-03-06 | End: 2026-03-06

## 2025-03-06 NOTE — PROGRESS NOTES
HEMATOLOGIC MALIGNANCIES CONSULT NOTE    The patient location is: [place]  The chief complaint leading to consultation is: MM    Visit type: audiovisual    Face to Face time with patient: 45 min  57 minutes of total time spent on the encounter, which includes face to face time and non-face to face time preparing to see the patient (eg, review of tests), Obtaining and/or reviewing separately obtained history, Documenting clinical information in the electronic or other health record, Independently interpreting results (not separately reported) and communicating results to the patient/family/caregiver, or Care coordination (not separately reported).         Each patient to whom he or she provides medical services by telemedicine is:  (1) informed of the relationship between the physician and patient and the respective role of any other health care provider with respect to management of the patient; and (2) notified that he or she may decline to receive medical services by telemedicine and may withdraw from such care at any time.    Notes:       IDENTIFYING STATEMENT   Miky Cheng) is a 54 y.o. male with a  of 1970 from [place], [place], referred by El Saunders for evaluation of Multiple Myeloma.     HISTORY OF PRESENT ILLNESS:    Referred to Dr. Saunders in 2024 for possible MGUS vs myeloma in setting of worsening renal function. Bone marrow biopsy 24 showed hypocellular marrow with increased plasma cells (15-20%)     Baseline labs 2024  kappa lc 698.20  Lambda lc 8.10  FLCR 86.2  IgG 881, IgA 61, IgM 24    B2M 3.6  Cr 1.95  Ca 9.2  Hgb 17.6    Labs 11/15/24   .04  LLC 7.44  FLCR 46.65  Cr 2.06    Started CyBorD 24      The patient is a 54-year-old male with newly diagnosed kappa light chain multiple myeloma (2024) currently on his third cycle of CyBorD (cyclophosphamide, bortezomib, dexamethasone) induction therapy. He presents to the myeloma clinic for evaluation  of autologous hematopoietic stem cell transplant (autoHSCT).  Treatment Tolerance: Reports tolerating CyBorD well overall, with fatigue on the day of treatment but no significant impact on daily functioning. He works full-time as a  and remains active, which is a positive indicator of his functional status.  Referral Rationale: Referred early for transplant consideration due to his strength and good physical condition.  Questions/Concerns: Patient seeks clarification on the transplant process, timeline, and success rates. He expresses motivation to proceed quickly, aiming to improve remission duration and quality of life.  Other Details:  Mild kidney dysfunction (eGFR ~35), noted but manageable with transplant dosing adjustments.  Lives with his wife in [place], with no major home risks identified for post-transplant recovery.  Well-supported at home with plans for a  during treatment.    INTERVAL HISTORY:  Mr. Carrasco participates remotely in telemedicine follow-up today. His BMBx on 1/28/25 showed residual plasma cells. He has only had partial response to treatment thus far. He saw Dr. Cardoso last week to be evaluated for auto hsct. Due to persistent disease Dr. Cardoso has recommended additional cycles of induction treatment to deepen remission. Today I spoke with Mr. Carrasco about changing to Dina-VRd--Discussed rationale, risks, expected toxicity and possible side-effects as well as symptomatic management/supportive care of Dina-VRd and he does wish to proceed.           RELEVANT COMORBID CONDITIONS:    Past Medical History:   Diagnosis Date    Disorder of kidney and ureter     Multiple myeloma not having achieved remission        Family History   Problem Relation Name Age of Onset    Hypertension Mother      Diabetes Mother      Skin cancer Mother      Hypertension Father      Cancer Sister         Social History     Socioeconomic History    Marital status:    Tobacco Use     Smoking status: Every Day     Passive exposure: Current    Smokeless tobacco: Current     Types: Chew   Substance and Sexual Activity    Alcohol use: Not Currently    Drug use: Not Currently     Types: Marijuana    Sexual activity: Yes     Social Drivers of Health     Financial Resource Strain: Low Risk  (2/20/2025)    Overall Financial Resource Strain (CARDIA)     Difficulty of Paying Living Expenses: Not hard at all   Food Insecurity: No Food Insecurity (2/20/2025)    Hunger Vital Sign     Worried About Running Out of Food in the Last Year: Never true     Ran Out of Food in the Last Year: Never true   Transportation Needs: No Transportation Needs (2/20/2025)    PRAPARE - Transportation     Lack of Transportation (Medical): No     Lack of Transportation (Non-Medical): No   Physical Activity: Insufficiently Active (2/20/2025)    Exercise Vital Sign     Days of Exercise per Week: 1 day     Minutes of Exercise per Session: 30 min   Stress: No Stress Concern Present (2/20/2025)    [employer] - Occupational Stress Questionnaire     Feeling of Stress : Only a little   Housing Stability: Low Risk  (2/20/2025)    Housing Stability Vital Sign     Unable to Pay for Housing in the Last Year: No     Number of Times Moved in the Last Year: 0     Homeless in the Last Year: No         MEDICATIONS:     Current Outpatient Medications on File Prior to Visit   Medication Sig Dispense Refill    acyclovir (ZOVIRAX) 400 MG tablet Take 1 tablet (400 mg total) by mouth once daily. 30 tablet 11    cycloPHOSphamide (CYTOXAN) 50 mg capsule Take 12 capsules (600 mg total) by mouth every 7 days on days 1, 8, 15 and 22 of each 28 day cycle. Take with lots of water. with 1 other cycloPHOSphamide prescription for 625 mg total. 48 capsule 5    cycloPHOSphamide 25 mg Cap Take 1 capsule (25 mg total) by mouth every 7 days on days 1, 8, 15 and 22 of each 28 day cycle. Take with lots of water. with 1 other  cycloPHOSphamide prescription for 625 mg total. 4 capsule 5    dexAMETHasone (DECADRON) 4 MG Tab Take 10 tablets (40 mg total) by mouth every 7 days. on days 1, 8, 15, and 22 of each chemotherapy cycle. Take with food. 40 tablet 5    sulfamethoxazole-trimethoprim 400-80mg (BACTRIM) 400-80 mg per tablet Take 1 tablet by mouth once daily. for 360 doses 30 tablet 11     No current facility-administered medications on file prior to visit.       ALLERGIES:   Review of patient's allergies indicates:   Allergen Reactions    Penicillins Rash    Baclofen Other (See Comments)     Can't sit still, causes body to twitch        ROS:       Review of Systems   Constitutional: Negative.    HENT:  Negative.     Eyes: Negative.    Respiratory: Negative.     Cardiovascular: Negative.    Gastrointestinal: Negative.    Genitourinary: Negative.     Musculoskeletal:         Leg cramps   Skin: Negative.    Neurological: Negative.    Hematological: Negative.    Psychiatric/Behavioral: Negative.         PHYSICAL EXAM:  There were no vitals filed for this visit.      Physical Exam  Vitals and nursing note reviewed.   Constitutional:       General: He is not in acute distress.     Appearance: Normal appearance. He is normal weight. He is not toxic-appearing.   HENT:      Head: Normocephalic and atraumatic.   Eyes:      General: No scleral icterus.     Conjunctiva/sclera: Conjunctivae normal.   Cardiovascular:      Rate and Rhythm: Normal rate.   Pulmonary:      Effort: Pulmonary effort is normal. No respiratory distress.   Abdominal:      General: There is no distension.   Musculoskeletal:         General: No deformity.   Skin:     Coloration: Skin is not jaundiced.      Findings: No erythema.   Neurological:      General: No focal deficit present.      Mental Status: He is alert and oriented to person, place, and time. Mental status is at baseline.   Psychiatric:         Mood and Affect: Mood normal.         Behavior: Behavior normal.          Thought Content: Thought content normal.         LAB:   Results for orders placed or performed in visit on 02/27/25   Comprehensive Metabolic Panel    Collection Time: 02/27/25  8:20 AM   Result Value Ref Range    Sodium 137 135 - 145 mmol/L    Potassium 4.4 3.6 - 5.2 mmol/L    Chloride 101 100 - 108 mmol/L    CO2 26 21 - 32 mmol/L    Anion Gap 10.0 3.0 - 11.0 mmol/L    BUN 20 (H) 7 - 18 mg/dL    Creatinine 1.81 (H) 0.70 - 1.30 mg/dL    GFR ESTIMATION 44 (L) >60    BUN/Creatinine Ratio 11.04 7 - 18 Ratio    Glucose 191 (H) 70 - 110 mg/dL    Calcium 8.5 (L) 8.8 - 10.5 mg/dL    Total Bilirubin 0.4 0.0 - 1.0 mg/dL    AST 16 15 - 37 U/L    ALT 41 12 - 78 U/L    Total Protein 6.3 (L) 6.4 - 8.2 g/dL    Albumin 3.4 3.4 - 5.0 g/dL    Globulin 2.9 2.3 - 3.5 g/dL    Albumin/Globulin Ratio 1.172 1.1 - 1.8 Ratio    Alkaline Phosphatase 101 46 - 116 U/L   Phosphorus    Collection Time: 02/27/25  8:20 AM   Result Value Ref Range    Phosphorus 2.1 (L) 2.6 - 4.7 mg/dL   Magnesium    Collection Time: 02/27/25  8:20 AM   Result Value Ref Range    Magnesium 1.8 1.8 - 2.4 mg/dL     CLINICAL DATA:  MGUS.  LEFT ILIUM BONE MARROW:  ADDENDUM REPORT  CYTOGENETICS  8/12/2024    HYPOCELLULAR BONE MARROW WITH PLASMA CELL DYSCRASIA (SEE COMMENT).  LEFT-SHIFTED AND MILDLY ATYPICAL MYELOID MATURATION PATTERN VIA FLOW CYTOMETRY.  CYTOGENETICS: 46,XY[20] - NORMAL MALE KARYOTYPE.    COMMENT: The clinical history of MGUS is noted, and plasma cells are increased, however they were in insufficient numbers to evaluate clonality.  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  Evaluation of other traditional CRAB features, as well as a repeat of serum protein electrophoresis and molecular studies should be considered.  The atypical myeloid element noted on flow analysis is not appreciated  within the material submitted, which has low (<2%) blasts.      GROSS  DESCRIPTION:  SPECIMEN: Bone marrow aspirate with core biopsy for pathologist's review  PROCEDURE: Peripheral smear, aspiration, and biopsy    WBC 9.2  RBC 5.49  HGB 16.4  HCT 49.3  MCV 89.8  MCH 29.9  MCHC 33.3  RDW 12.1  PLATELETS 213  MPV 10.4  NEUT 70  BANDS 0 LYMPHS 25  MONOS 4  EOS 1      MICROSCOPIC DESCRIPTION:  PERIPHERAL SMEAR  RBCs: show a normocytic, normochromic red cell population with normal RDW.  No rouleaux, schistocytes, significant polychromasia, or nucleated red blood cells are seen.  WBCs: normal in number with a normal distribution.  No dysplastic neutrophils, lymphocytosis, monocytosis, or definitive blasts are seen.  Platelets: normal in number and morphology.    ASPIRATE SMEARS (evaluation of 4 [place]-Gi[place]a stained slides)  CELLULARITY: hypocellular.  M:E ratio: 3:1.  ERYTHROID LINE: adequate in number with normoblastic maturation.  MYELOID LINE: complete maturation, but with a slight tendency toward immaturity, with no increase in blasts.  MEGAKARYOCYTIC LINE: adequate in number and morphology.  LYMPHOID CELLS: not increased, no atypical lymphocytes or aggregates seen.  PLASMA CELLS: increased.    BONE MARROW TREPHINE BIOPSY and CLOT SECTION  TECHNICAL QUALITY: adequate biopsy, evaluated following decalcification; adequate clot section.  CELLULARITY: 20-30% cellular (hypocellular).  ERYTHROID LINE: adequate in number.  MYELOID LINE: complete maturation with no increase in blasts (<2% blasts).  MEGAKARYOCYTIC LINE: adequate in number and morphology.  LYMPHOID CELLS: not increased, no atypical lymphocytes or atypical aggregates are seen.  PLASMA CELLS: increased (15 to 20%).    CYTOCHEMICAL STAINS  PRUSSIAN BLUE: stainable iron is adequate in the aspirate smear. No ring sideroblasts are noted.  Control slide stained appropriately.    IMMUNOHISTOCHEMICAL STAINS  An immunohistochemical panel is performed on paraffin embedded tissue (core biopsy and clot section) and stains as follows:    (plasma cell marker): positive (4+ of 4+, cytoplasmic in 15 to 20% of cells)  CD34: positive (4+ of 4+, cytoplasmic in <2% of cells)  Control slides stained appropriately.    FLOW CYTOMETRY PERFORMED AND INTERPRETED BY: [employer]    INTERPRETATION: Left-shifted and mildly atypical myeloid maturation pattern.    COMMENT: Flow shows a mildly atypical and left-shifted myeloid maturation pattern.  The findings raise concern for a myeloid neoplasm, although they can also be seen in some reactive/infectious processes, or with drugs such as growth factor therapy.    Correlation with the manual blast count (gold standard) is necessary for confirmation since flow cytometry may over or under estimate the blast count.  There is no flow immunophenotypic evidence of a lymphoproliferative disorder, and plasma cells are  insufficient in number to evaluate for clonality on the plasma cell add on panel.  Please correlate the result with morphological findings, other pertinent laboratory data, and clinical information.  For additional information, please see separate flow  cytometry report.     Specimen Collected: 08/02/24 00:00 CDT Last Resulted: 08/12/24 13:46 CDT     PROBLEMS ASSESSED THIS VISIT:    1. Multiple myeloma not having achieved remission    2. Environmental allergies    3. Stem cell transplant candidate        MULTIPLE MYELOMA DISEASE CHARACTERISTICS:    Date of diagnosis: 8/2/24    Baseline serologic characteristics at diagnosis:    - M-protein: n/a g/dl   - JANIS: n/a   - Free light chains:    - Kappa 698.20 mg/dl    - Lambda 8.10 mg/dl    - Kappa/Lambda ratio 86.20    Prognostic Factors at diagnosis:   - Serum albumin: 4.1   - LDH: 180   - Beta-2 microglobulin: 3.6   - Chromosomal abnormalities on FISH: n/a (normal cytogenetic karyotype)    R-ISS Stage: II    PLAN:       Transplant Eligibility  - This patient is eligible for autologous stem cell transplant. He has established with Dr. Janae Alcazar  Plan  - Treatment with Dina-VRd  - Cycle length: 28 days  - Schedule of treatment:   - Revlimid 25mg on days 1-21   - Darzalex faspro 1800mg on days 1, 8, 15, 22  - Bortezomib 1.3mg/m2 on days 1, 8, 15, 22   - Dexamethasone 40mg on days 1, 8, 15, 22    Antimicrobial Prophylaxis  - HSV/VZV: acyclovir  - Bacterial: none  - PJP: TMP/SMX  - Other prophylaxis na    Thromboprophylaxis  - baby aspirin advised to start when he starts revlimid    Response Assessment:  - Evaluate serologic studies with each cycle (SPEP, JANIS, free light chains)  - Obtain Bone marrow biopsy for response assessment after 4 cycles    FOLLOW-UP PLAN:    Multiple Myeloma, kappa light chain, on induction (CyBorD):  Assessment: Responding well to induction therapy with preserved functional status. Preparing for autoHSCT for deeper remission.  Currently on C3; labs prior to C3 suggestive of SC based on .2-->347.04.  Plan:  Coordinate with transplant specialist (Dr. Cardoso) for evaluation and planning.  Arrange restaging studies, including:  Bone marrow biopsy to confirm remission status after C4,  PET CT after C4  Updated serum and urine myeloma markers prior to transplant clinic eval.  Continue CyBorD with Dr. Saunders in [place] as planned until transplant timing is confirmed.  Recommend dental evaluation for initiation of antiresorptive therapy (ie Zometa or Xgeva.)    2. Transplant Candidacy:  Assessment: Good candidate for autoHSCT based on functional status, disease control, and strong support system.  Plan:  -discussed average 67 month median PFS following transplant (per determination trial) with standard risk disease but that early relapses as well as much more durable remissions >10 years possible with transplant approach in modern therapy era   -discussed my recommendation for early over late transplant; but that late transplant could be considered given standard risk disease   -discussed approximately 2 week hospitalization  during which most pts experience moderate chemotherapy related side effects   -discussed approximate day +100 restaging and likely recommendations for Revlimid maintenance for at least 2 years post transplant  and possibly indefinitely or until intolerance or progression   -discussed need for revaccinations starting at day +180   -discussed need for appt and medication compliance following transplant   -discussed need for full time caretaker through day +30   -patient  and family  expressed understanding and all questions answered;   -I will have transplant coordinator contact patient promptly to discuss logistics and address any remaining questions/concerns.    3. Renal Impairment:  Assessment: Likely myeloma-related kidney dysfunction; no contraindication to melphalan with dose adjustment.  Plan:  Monitor renal function and adjust transplant medications as needed.    4. Post-Transplant Planning:  Assessment: Maintenance therapy with Revlimid (lenalidomide) post-transplant is the standard of care to prolong remission.  Plan:  Educate patient about maintenance therapy expectations and potential for long-term remission.  @Route Chart for Scheduling    BMT Chart Routing      Follow up with physician 4 weeks.   Follow up with PATRICIA    Provider visit type    Infusion scheduling note    Injection scheduling note    Labs Free light chains, immunofixation, immunoglobulins, CBC, CMP and SPEP   Schedulin day prior to infusion  Preferred lab:  Lab interval:     Imaging    Pharmacy appointment    Other referrals                  Treatment Plan Information   OP CyBorD: CYCLOPHOSPHAMIDE PO + ONCE WEEKLY BORTEZOMIB + DEXAMETHASONE Q4W  El Saunders MD   Associated diagnosis: Multiple myeloma not having achieved remission  Beta-2-microglobulin (mg/L): 3.6, Albumin (g/dL): 4.1, ISS: Stage II, High-risk cytogenetics: Absent, LDH: Normal noted on 8/15/2024   Line of treatment: First Line  Treatment Goal: Curative     Upcoming  Treatment Dates - OP CyBorD: CYCLOPHOSPHAMIDE PO + ONCE WEEKLY BORTEZOMIB + DEXAMETHASONE Q4W     3/7/2025       Pre-Medications       palonosetron injection 0.25 mg       Chemotherapy       bortezomib (VELCADE) injection 3.25 mg  3/14/2025       Pre-Medications       palonosetron injection 0.25 mg       Chemotherapy       bortezomib (VELCADE) injection 3.25 mg      Hector Meredith MD  Section of Multiple Myeloma and Plasma Cell Disorders  Malignant Hematology and Cellular Therapy  [facility]     Total time of this visit, including time spent face to face with patient and/or via video/audio, and also in preparing for today's visit for MDM and documentation. (Medical Decision Making, including consideration of possible diagnoses, management options, complex medical record review, review of diagnostic tests and information, consideration and discussion of significant complications based on comorbidities, and discussion with providers involved with the care of the patient) 57 minutes. Greater than 50% was spent face to face with the patient counseling and coordinating care.

## 2025-03-06 NOTE — Clinical Note
Hi Dr. Saunders, I think you saw the message from Dr. Cardoso but basically Mr. Carrasco's bone marrow showed residual disease and so he has recommended trying a different induction regimen prior to stem cell consolidation--I discussed starting Dina-VRd with the patient yesterday which would be my recommendation for next line of treatment, of course he can get that done with you closer to home. Let me know if you have any questions.

## 2025-03-07 ENCOUNTER — OFFICE VISIT (OUTPATIENT)
Dept: HEMATOLOGY/ONCOLOGY | Facility: CLINIC | Age: 55
End: 2025-03-07
Payer: COMMERCIAL

## 2025-03-07 ENCOUNTER — PATIENT MESSAGE (OUTPATIENT)
Dept: HEMATOLOGY/ONCOLOGY | Facility: CLINIC | Age: 55
End: 2025-03-07
Payer: COMMERCIAL

## 2025-03-07 VITALS
OXYGEN SATURATION: 98 % | SYSTOLIC BLOOD PRESSURE: 146 MMHG | WEIGHT: 206.38 LBS | DIASTOLIC BLOOD PRESSURE: 76 MMHG | HEIGHT: 71 IN | BODY MASS INDEX: 28.89 KG/M2 | RESPIRATION RATE: 16 BRPM | TEMPERATURE: 98 F | HEART RATE: 72 BPM

## 2025-03-07 DIAGNOSIS — C90.00 MULTIPLE MYELOMA NOT HAVING ACHIEVED REMISSION: Primary | ICD-10-CM

## 2025-03-07 DIAGNOSIS — N18.32 STAGE 3B CHRONIC KIDNEY DISEASE: ICD-10-CM

## 2025-03-07 DIAGNOSIS — Z76.82 STEM CELL TRANSPLANT CANDIDATE: ICD-10-CM

## 2025-03-07 DIAGNOSIS — D84.81 IMMUNODEFICIENCY DUE TO CONDITIONS CLASSIFIED ELSEWHERE: ICD-10-CM

## 2025-03-07 NOTE — PROGRESS NOTES
HEMATOLOGY FOLLOW UP CONSULTATION NOTE    Patient ID: Miky Carrasco is a 54 y.o. male.    Chief Complaint:  Multiple myeloma    HPI:  Patient is a 53-year-old male with past medical history of hyperlipidemia who recently was evaluated by Nephrology for worsening kidney function and underwent further lab work including serum protein electrophoresis which showed findings concerning for possible MGUS versus active multiple myeloma with decline in kidney.  Patient presents to our clinic today for further evaluation.  Voices no acute complaints. Denies bone pain, activity and appetite changes.              Past Medical History:   Diagnosis Date    Disorder of kidney and ureter     Multiple myeloma not having achieved remission        Family History   Problem Relation Name Age of Onset    Hypertension Mother      Diabetes Mother      Skin cancer Mother      Hypertension Father      Cancer Sister         Social History     Socioeconomic History    Marital status:    Tobacco Use    Smoking status: Every Day     Passive exposure: Current    Smokeless tobacco: Current     Types: Chew   Substance and Sexual Activity    Alcohol use: Not Currently    Drug use: Not Currently     Types: Marijuana    Sexual activity: Yes     Social Drivers of Health     Financial Resource Strain: Low Risk  (2/20/2025)    Overall Financial Resource Strain (CARDIA)     Difficulty of Paying Living Expenses: Not hard at all   Food Insecurity: No Food Insecurity (2/20/2025)    Hunger Vital Sign     Worried About Running Out of Food in the Last Year: Never true     Ran Out of Food in the Last Year: Never true   Transportation Needs: No Transportation Needs (2/20/2025)    PRAPARE - Transportation     Lack of Transportation (Medical): No     Lack of Transportation (Non-Medical): No   Physical Activity: Insufficiently Active (2/20/2025)    Exercise Vital Sign     Days of Exercise per Week: 1 day     Minutes of Exercise per Session: 30 min    Stress: No Stress Concern Present (2/20/2025)    [employer] [place] [employer] - Occupational Stress Questionnaire     Feeling of Stress : Only a little   Housing Stability: Low Risk  (2/20/2025)    Housing Stability Vital Sign     Unable to Pay for Housing in the Last Year: No     Number of Times Moved in the Last Year: 0     Homeless in the Last Year: No         History reviewed. No pertinent surgical history.      Review of systems:  Review of Systems   Constitutional:  Negative for activity change, appetite change, chills, diaphoresis, fatigue and unexpected weight change.   HENT:  Negative for congestion, facial swelling, hearing loss, mouth sores, trouble swallowing and voice change.    Eyes:  Negative for photophobia, pain, discharge and itching.   Respiratory:  Negative for apnea, cough, choking, chest tightness and shortness of breath.    Cardiovascular:  Negative for chest pain, palpitations and leg swelling.   Gastrointestinal:  Negative for abdominal distention, abdominal pain, anal bleeding and blood in stool.   Endocrine: Negative for cold intolerance, heat intolerance, polydipsia and polyphagia.   Genitourinary:  Negative for difficulty urinating, dysuria, flank pain and hematuria.   Musculoskeletal:  Negative for arthralgias, back pain, joint swelling, myalgias, neck pain and neck stiffness.   Skin:  Negative for color change, pallor and wound.   Allergic/Immunologic: Negative for environmental allergies, food allergies and immunocompromised state.   Neurological:  Negative for dizziness, seizures, facial asymmetry, speech difficulty, light-headedness, numbness and headaches.   Hematological:  Negative for adenopathy. Does not bruise/bleed easily.   Psychiatric/Behavioral:  Negative for agitation, behavioral problems, confusion, decreased concentration and sleep disturbance.                                Physical Exam  Vitals and nursing note reviewed.   Constitutional:       General:  He is not in acute distress.     Appearance: Normal appearance. He is not ill-appearing.   HENT:      Head: Normocephalic and atraumatic.      Nose: No congestion or rhinorrhea.   Eyes:      General: No scleral icterus.     Extraocular Movements: Extraocular movements intact.      Pupils: Pupils are equal, round, and reactive to light.   Cardiovascular:      Rate and Rhythm: Normal rate and regular rhythm.      Pulses: Normal pulses.      Heart sounds: Normal heart sounds. No murmur heard.     No gallop.   Pulmonary:      Effort: Pulmonary effort is normal. No respiratory distress.      Breath sounds: Normal breath sounds. No stridor. No wheezing or rhonchi.   Abdominal:      General: Bowel sounds are normal. There is no distension.      Palpations: There is no mass.      Tenderness: There is no abdominal tenderness. There is no guarding.   Musculoskeletal:         General: No swelling, tenderness, deformity or signs of injury. Normal range of motion.      Cervical back: Normal range of motion and neck supple. No rigidity. No muscular tenderness.      Right lower leg: No edema.      Left lower leg: No edema.   Skin:     General: Skin is warm.      Coloration: Skin is not jaundiced or pale.      Findings: No bruising or lesion.   Neurological:      General: No focal deficit present.      Mental Status: He is alert and oriented to person, place, and time.      Cranial Nerves: No cranial nerve deficit.      Sensory: No sensory deficit.      Motor: No weakness.      Gait: Gait normal.   Psychiatric:         Mood and Affect: Mood normal.         Behavior: Behavior normal.         Thought Content: Thought content normal.     Vitals:    03/07/25 1001   BP: (!) 146/76   Pulse: 72   Resp: 16   Temp: 97.8 °F (36.6 °C)       Body surface area is 2.17 meters squared.    Assessment/Plan:      ECOG 1    Multiple myeloma:    == reviewed prior lab work done with Nephrology.  Patient is noted to have worsening renal failure and also  has presence of monoclonal band on immuno fixation studies.  I will initiate workup for multiple myeloma and will include a bone marrow biopsy as well as a PET scan to rule out any osteolytic lesions.  Discussed these findings in detail with patient giving him opportunity to ask questions  == 8/18/24:  Bone marrow biopsy done showed increased number of plasma cells.  No percentage mentioned but reported to be more than normal and in the smoldering range.  With evidence of worsening renal failure along with elevated kappa free light chains and kappa/lambda free light chain ratio findings are consistent with active multiple myeloma.  Due to decline in kidney function my initial plan would be to initiate CyBorD.  Patient reports that he is on a job which requires lot of traveling and unable to come to clinics  regularly do receive his treatment.   ==9/23/24:  Patient and wife here today to discuss upcoming chemotherapy/immunotherapy. An extensive discussion was had which included a thorough discussion of potential side effect profile, risk versus benefits, and expected outcomes.  Risks, including but not limited to, possible hair loss, bone marrow damage, damage to body organs, allergic reactions, sterility, nausea/vomiting, constipation/diarrhea, sores in the mouth, secondary cancers, and rarely death were all discuss.  Specific side effects pertaining to their chemotherapy/immunotherapy medications were discussed as well.  The patient agrees with the plan and all questions and their support systems questions have been answered to their satisfaction.  Premedications were prescribed and patient was educated on appropriate usage.  Patient was educated on when to call, and given the number to call, or to notify the provider including but not limited to:  Persistent nausea and vomiting, dehydration, fever greater than 100.4 lasting over 1 hour induration or isolated feeders greater than 101, rash while on active  chemotherapy or immunotherapy, severe pain or new onset pain not controlled by current pain medication regimen.   ==10/04/2024: Tolerating chemotherapy well, pt has leg cramps has had all of his life worse at night, potassium mg normal, discussed medication, pt would like to try otc topicals such as biofreeze or icyhot with lidocaine first and if not helpful will notify clinic and will send our rx.  No other complaints, will continue chemotherapy, plan to see next week and if continues to tolerate well plan to see every other week.  ==10/11/2024: Magnesium wnl, but hypophosphatemia noted, pt denies diarrhea or antacid use.  Will start gentle supplementation with po multivitamin, discussed with patient his renal function and may need po phosphate if repeat labs show continued low phosphorus, but black box warning in CKD for po phosphate.  Will try multivitamin containing phos as well as increasing dietary intake - reviewed dietary sources of phosphorus.  Will also check vitamin D level, Vit D deficiency can cause/worsen hypophosphatemia and recheck phos level to determine if transient.  Pt is asymptomatic at this time has occasional leg cramps for all of his life, no increase or other symptoms.  Labs reviewed will continue chemotherapy   ==11/22/2024: Phos wnl, tolerating Cybor D well, leg cramps improved with baclofen, labs reviewed patient is cleared for chemotherapy. Will now also make referral to BMT team in [place].   ==12/27/2024: Has appt with Dr. Cardoso 1/16/2025, tolerating CyBorD well.  May use labs from BMT appt on 1/16/25 for chemo scheduled on 1/17/2024, will need cbc cmp. Denies complaints, will continue with CyBorD.  ==02/21/2025: Tolerating CyBorD well, next appt with Dr. Cardoso is on 2/27/2025 for transplant eval. Last myeloma labs were on 1/24/25 so I will have drawn today so they will be resulted for his visit next week. Whole body PET ct reviewed which showed bony degenerative changes  without evidence of bony destruction, no hypermetabolic activity seen and no interval changes from prior study.  Will continue CyBorD at this time.   ==03/07/2025: Pt recently had visit with Dr. Meredith with transplant who recommends patient to switch to Dina+VRD and start Xgeva. He has had dental clearance which was sent to [facility] will be scanned in chart.  Plan to send Dina+VRD as well as xgeva for approval and continue CyborD until approved. Will add hep panel and type and screen to next labs in anticipation of starting Dina    2. Prophylaxis for infection prevention  == Continue Bactrim and acyclovir    3. Hypophosphatemia  ==Asymptomatic, normal magnesium level - pt has had leg cramps all of life, no increase previous phos levels wnl  ==Recheck to determine if transient, avoid antacids, check vitamin D level  ==encourage po intake, will start multivitamin containing phosphorus, may need higher dose if does not resolve, will prescribe with caution given renal function  == 11/22/24: Resolved    Plan:  Continue  CyBorD - until start Dina VRD  Send dina vrd for approval - sent  weekly treatment with labs: Cbc cmp q weekly   NP appt in 1 weeks - need chemo ed visit   Continue Bactrim and valtrex prophylaxis  Myeloma labs q 4 weeks           Total time spent in counseling and discussion about further management options including relevant lab work, treatment,  prognosis, medications and intended side effects was more than 40 minutes. More than 50 % of the time was spent in counseling and coordination of care.  This includes face to face time and non-face to face time preparing to see the patient (eg, review of tests), Obtaining and/or reviewing separately obtained history, Documenting clinical information in the electronic or other health record, Independently interpreting resultsand communicating results to the patient/family/caregiver, or Care coordination.

## 2025-03-07 NOTE — PROGRESS NOTES
HEMATOLOGIC MALIGNANCIES CONSULT NOTE    The patient location is: [place]  The chief complaint leading to consultation is: MM    Visit type: audiovisual    Face to Face time with patient: 45 min  57 minutes of total time spent on the encounter, which includes face to face time and non-face to face time preparing to see the patient (eg, review of tests), Obtaining and/or reviewing separately obtained history, Documenting clinical information in the electronic or other health record, Independently interpreting results (not separately reported) and communicating results to the patient/family/caregiver, or Care coordination (not separately reported).         Each patient to whom he or she provides medical services by telemedicine is:  (1) informed of the relationship between the physician and patient and the respective role of any other health care provider with respect to management of the patient; and (2) notified that he or she may decline to receive medical services by telemedicine and may withdraw from such care at any time.    Notes:       IDENTIFYING STATEMENT   Miky Cheng) is a 54 y.o. male with a  of 1970 from [place], [place], referred by El Saunders for evaluation of Multiple Myeloma.     HISTORY OF PRESENT ILLNESS:    Referred to Dr. Saunders in 2024 for possible MGUS vs myeloma in setting of worsening renal function. Bone marrow biopsy 24 showed hypocellular marrow with increased plasma cells (15-20%)     Baseline labs 2024  kappa lc 698.20  Lambda lc 8.10  FLCR 86.2  IgG 881, IgA 61, IgM 24    B2M 3.6  Cr 1.95  Ca 9.2  Hgb 17.6    Labs 11/15/24   .04  LLC 7.44  FLCR 46.65  Cr 2.06    Started CyBorD 24      The patient is a 54-year-old male with newly diagnosed kappa light chain multiple myeloma (2024) currently on his third cycle of CyBorD (cyclophosphamide, bortezomib, dexamethasone) induction therapy. He presents to the myeloma clinic for evaluation  of autologous hematopoietic stem cell transplant (autoHSCT).  Treatment Tolerance: Reports tolerating CyBorD well overall, with fatigue on the day of treatment but no significant impact on daily functioning. He works full-time as a  and remains active, which is a positive indicator of his functional status.  Referral Rationale: Referred early for transplant consideration due to his strength and good physical condition.  Questions/Concerns: Patient seeks clarification on the transplant process, timeline, and success rates. He expresses motivation to proceed quickly, aiming to improve remission duration and quality of life.  Other Details:  Mild kidney dysfunction (eGFR ~35), noted but manageable with transplant dosing adjustments.  Lives with his wife in [place], with no major home risks identified for post-transplant recovery.  Well-supported at home with plans for a  during treatment.    INTERVAL HISTORY:  Mr. Carrasco participates remotely in telemedicine follow-up today. His BMBx on 1/28/25 showed residual plasma cells. He has only had partial response to treatment thus far. He saw Dr. Cardoso last week to be evaluated for auto hsct. Due to persistent disease Dr. Cardoso has recommended additional cycles of induction treatment to deepen remission. Today I spoke with Mr. Carrasco about changing to Dina-VRd--Discussed rationale, risks, expected toxicity and possible side-effects as well as symptomatic management/supportive care of Dina-VRd and he does wish to proceed.           RELEVANT COMORBID CONDITIONS:    Past Medical History:   Diagnosis Date    Disorder of kidney and ureter     Multiple myeloma not having achieved remission        Family History   Problem Relation Name Age of Onset    Hypertension Mother      Diabetes Mother      Skin cancer Mother      Hypertension Father      Cancer Sister         Social History     Socioeconomic History    Marital status:     Tobacco Use    Smoking status: Every Day     Passive exposure: Current    Smokeless tobacco: Current     Types: Chew   Substance and Sexual Activity    Alcohol use: Not Currently    Drug use: Not Currently     Types: Marijuana    Sexual activity: Yes     Social Drivers of Health     Financial Resource Strain: Low Risk  (2/20/2025)    Overall Financial Resource Strain (CARDIA)     Difficulty of Paying Living Expenses: Not hard at all   Food Insecurity: No Food Insecurity (2/20/2025)    Hunger Vital Sign     Worried About Running Out of Food in the Last Year: Never true     Ran Out of Food in the Last Year: Never true   Transportation Needs: No Transportation Needs (2/20/2025)    PRAPARE - Transportation     Lack of Transportation (Medical): No     Lack of Transportation (Non-Medical): No   Physical Activity: Insufficiently Active (2/20/2025)    Exercise Vital Sign     Days of Exercise per Week: 1 day     Minutes of Exercise per Session: 30 min   Stress: No Stress Concern Present (2/20/2025)    [employer] - Occupational Stress Questionnaire     Feeling of Stress : Only a little   Housing Stability: Low Risk  (2/20/2025)    Housing Stability Vital Sign     Unable to Pay for Housing in the Last Year: No     Number of Times Moved in the Last Year: 0     Homeless in the Last Year: No         MEDICATIONS:     Current Outpatient Medications on File Prior to Visit   Medication Sig Dispense Refill    acyclovir (ZOVIRAX) 400 MG tablet Take 1 tablet (400 mg total) by mouth once daily. 30 tablet 11    cetirizine (ZYRTEC) 10 MG tablet Take 1 tablet (10 mg total) by mouth once daily. 90 tablet 3    cycloPHOSphamide (CYTOXAN) 50 mg capsule Take 12 capsules (600 mg total) by mouth every 7 days on days 1, 8, 15 and 22 of each 28 day cycle. Take with lots of water. with 1 other cycloPHOSphamide prescription for 625 mg total. 48 capsule 5    cycloPHOSphamide 25 mg Cap Take 1 capsule  (25 mg total) by mouth every 7 days on days 1, 8, 15 and 22 of each 28 day cycle. Take with lots of water. with 1 other cycloPHOSphamide prescription for 625 mg total. 4 capsule 5    dexAMETHasone (DECADRON) 4 MG Tab Take 10 tablets (40 mg total) by mouth every 7 days. on days 1, 8, 15, and 22 of each chemotherapy cycle. Take with food. 40 tablet 5    fluticasone propionate (FLONASE) 50 mcg/actuation nasal spray 1 spray (50 mcg total) by Each Nostril route once daily. 18.2 g 3    sulfamethoxazole-trimethoprim 400-80mg (BACTRIM) 400-80 mg per tablet Take 1 tablet by mouth once daily. for 360 doses 30 tablet 11     No current facility-administered medications on file prior to visit.       ALLERGIES:   Review of patient's allergies indicates:   Allergen Reactions    Penicillins Rash    Baclofen Other (See Comments)     Can't sit still, causes body to twitch        ROS:       Review of Systems   Constitutional: Negative.    HENT:  Negative.     Eyes: Negative.    Respiratory: Negative.     Cardiovascular: Negative.    Gastrointestinal: Negative.    Genitourinary: Negative.     Musculoskeletal:         Leg cramps   Skin: Negative.    Neurological: Negative.    Hematological: Negative.    Psychiatric/Behavioral: Negative.         PHYSICAL EXAM:  There were no vitals filed for this visit.      Physical Exam  Vitals and nursing note reviewed.   Constitutional:       General: He is not in acute distress.     Appearance: Normal appearance. He is normal weight. He is not toxic-appearing.   HENT:      Head: Normocephalic and atraumatic.   Eyes:      General: No scleral icterus.     Conjunctiva/sclera: Conjunctivae normal.   Cardiovascular:      Rate and Rhythm: Normal rate.   Pulmonary:      Effort: Pulmonary effort is normal. No respiratory distress.   Abdominal:      General: There is no distension.   Musculoskeletal:         General: No deformity.   Skin:     Coloration: Skin is not jaundiced.      Findings: No  erythema.   Neurological:      General: No focal deficit present.      Mental Status: He is alert and oriented to person, place, and time. Mental status is at baseline.   Psychiatric:         Mood and Affect: Mood normal.         Behavior: Behavior normal.         Thought Content: Thought content normal.       LAB:   Results for orders placed or performed in visit on 02/27/25   Comprehensive Metabolic Panel    Collection Time: 02/27/25  8:20 AM   Result Value Ref Range    Sodium 137 135 - 145 mmol/L    Potassium 4.4 3.6 - 5.2 mmol/L    Chloride 101 100 - 108 mmol/L    CO2 26 21 - 32 mmol/L    Anion Gap 10.0 3.0 - 11.0 mmol/L    BUN 20 (H) 7 - 18 mg/dL    Creatinine 1.81 (H) 0.70 - 1.30 mg/dL    GFR ESTIMATION 44 (L) >60    BUN/Creatinine Ratio 11.04 7 - 18 Ratio    Glucose 191 (H) 70 - 110 mg/dL    Calcium 8.5 (L) 8.8 - 10.5 mg/dL    Total Bilirubin 0.4 0.0 - 1.0 mg/dL    AST 16 15 - 37 U/L    ALT 41 12 - 78 U/L    Total Protein 6.3 (L) 6.4 - 8.2 g/dL    Albumin 3.4 3.4 - 5.0 g/dL    Globulin 2.9 2.3 - 3.5 g/dL    Albumin/Globulin Ratio 1.172 1.1 - 1.8 Ratio    Alkaline Phosphatase 101 46 - 116 U/L   Phosphorus    Collection Time: 02/27/25  8:20 AM   Result Value Ref Range    Phosphorus 2.1 (L) 2.6 - 4.7 mg/dL   Magnesium    Collection Time: 02/27/25  8:20 AM   Result Value Ref Range    Magnesium 1.8 1.8 - 2.4 mg/dL     CLINICAL DATA:  MGUS.  LEFT ILIUM BONE MARROW:  ADDENDUM REPORT  CYTOGENETICS  8/12/2024    HYPOCELLULAR BONE MARROW WITH PLASMA CELL DYSCRASIA (SEE COMMENT).  LEFT-SHIFTED AND MILDLY ATYPICAL MYELOID MATURATION PATTERN VIA FLOW CYTOMETRY.  CYTOGENETICS: 46,XY[20] - NORMAL MALE KARYOTYPE.    COMMENT: The clinical history of MGUS is noted, and plasma cells are increased, however they were in insufficient numbers to evaluate clonality.  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  Evaluation of other traditional  CRAB features, as well as a repeat of serum protein electrophoresis and molecular studies should be considered.  The atypical myeloid element noted on flow analysis is not appreciated  within the material submitted, which has low (<2%) blasts.      GROSS DESCRIPTION:  SPECIMEN: Bone marrow aspirate with core biopsy for pathologist's review  PROCEDURE: Peripheral smear, aspiration, and biopsy    WBC 9.2  RBC 5.49  HGB 16.4  HCT 49.3  MCV 89.8  MCH 29.9  MCHC 33.3  RDW 12.1  PLATELETS 213  MPV 10.4  NEUT 70  BANDS 0 LYMPHS 25  MONOS 4  EOS 1      MICROSCOPIC DESCRIPTION:  PERIPHERAL SMEAR  RBCs: show a normocytic, normochromic red cell population with normal RDW.  No rouleaux, schistocytes, significant polychromasia, or nucleated red blood cells are seen.  WBCs: normal in number with a normal distribution.  No dysplastic neutrophils, lymphocytosis, monocytosis, or definitive blasts are seen.  Platelets: normal in number and morphology.    ASPIRATE SMEARS (evaluation of 4 [place] stained slides)  CELLULARITY: hypocellular.  M:E ratio: 3:1.  ERYTHROID LINE: adequate in number with normoblastic maturation.  MYELOID LINE: complete maturation, but with a slight tendency toward immaturity, with no increase in blasts.  MEGAKARYOCYTIC LINE: adequate in number and morphology.  LYMPHOID CELLS: not increased, no atypical lymphocytes or aggregates seen.  PLASMA CELLS: increased.    BONE MARROW TREPHINE BIOPSY and CLOT SECTION  TECHNICAL QUALITY: adequate biopsy, evaluated following decalcification; adequate clot section.  CELLULARITY: 20-30% cellular (hypocellular).  ERYTHROID LINE: adequate in number.  MYELOID LINE: complete maturation with no increase in blasts (<2% blasts).  MEGAKARYOCYTIC LINE: adequate in number and morphology.  LYMPHOID CELLS: not increased, no atypical lymphocytes or atypical aggregates are seen.  PLASMA CELLS: increased (15 to 20%).    CYTOCHEMICAL STAINS  PRUSSIAN BLUE: stainable iron is adequate  in the aspirate smear. No ring sideroblasts are noted.  Control slide stained appropriately.    IMMUNOHISTOCHEMICAL STAINS  An immunohistochemical panel is performed on paraffin embedded tissue (core biopsy and clot section) and stains as follows:   (plasma cell marker): positive (4+ of 4+, cytoplasmic in 15 to 20% of cells)  CD34: positive (4+ of 4+, cytoplasmic in <2% of cells)  Control slides stained appropriately.    FLOW CYTOMETRY PERFORMED AND INTERPRETED BY: Melboss    INTERPRETATION: Left-shifted and mildly atypical myeloid maturation pattern.    COMMENT: Flow shows a mildly atypical and left-shifted myeloid maturation pattern.  The findings raise concern for a myeloid neoplasm, although they can also be seen in some reactive/infectious processes, or with drugs such as growth factor therapy.    Correlation with the manual blast count (gold standard) is necessary for confirmation since flow cytometry may over or under estimate the blast count.  There is no flow immunophenotypic evidence of a lymphoproliferative disorder, and plasma cells are  insufficient in number to evaluate for clonality on the plasma cell add on panel.  Please correlate the result with morphological findings, other pertinent laboratory data, and clinical information.  For additional information, please see separate flow  cytometry report.     Specimen Collected: 08/02/24 00:00 CDT Last Resulted: 08/12/24 13:46 CDT     PROBLEMS ASSESSED THIS VISIT:    1. Multiple myeloma not having achieved remission    2. Stem cell transplant candidate    3. Immunodeficiency due to conditions classified elsewhere    4. Stage 3b chronic kidney disease        MULTIPLE MYELOMA DISEASE CHARACTERISTICS:    Date of diagnosis: 8/2/24    Baseline serologic characteristics at diagnosis:    - M-protein: n/a g/dl   - JANIS: n/a   - Free light chains:    - Kappa 698.20 mg/dl    - Lambda 8.10 mg/dl    - Kappa/Lambda ratio 86.20    Prognostic  Factors at diagnosis:   - Serum albumin: 4.1   - LDH: 180   - Beta-2 microglobulin: 3.6   - Chromosomal abnormalities on FISH: n/a (normal cytogenetic karyotype)    R-ISS Stage: II    PLAN:       Transplant Eligibility  - This patient is eligible for autologous stem cell transplant. He has established with Dr. Cardoso    Therapy Plan  - Treatment with Dina-VRd  - Cycle length: 28 days  - Schedule of treatment:   - Revlimid 25mg on days 1-21   - Darzalex faspro 1800mg on days 1, 8, 15, 22  - Bortezomib 1.3mg/m2 on days 1, 8, 15, 22   - Dexamethasone 40mg on days 1, 8, 15, 22    Antimicrobial Prophylaxis  - HSV/VZV: acyclovir  - Bacterial: none  - PJP: TMP/SMX  - Other prophylaxis na    Thromboprophylaxis  - baby aspirin advised to start when he starts revlimid    Response Assessment:  - Evaluate serologic studies with each cycle (SPEP, JANIS, free light chains)  - Obtain Bone marrow biopsy for response assessment after 4 cycles    FOLLOW-UP PLAN:    Multiple Myeloma, kappa light chain, on induction (CyBorD):  Assessment: Responding well to induction therapy with preserved functional status. Preparing for autoHSCT for deeper remission.  Currently on C3; labs prior to C3 suggestive of OK based on .2-->347.04.  Plan:  Coordinate with transplant specialist (Dr. Cardoso) for evaluation and planning.  Arrange restaging studies, including:  Bone marrow biopsy to confirm remission status after C4,  PET CT after C4  Updated serum and urine myeloma markers prior to transplant clinic eval.  Continue CyBorD with Dr. Saunders in [place] as planned until transplant timing is confirmed.  Recommend dental evaluation for initiation of antiresorptive therapy (ie Zometa or Xgeva.)    2. Transplant Candidacy:  Assessment: Good candidate for autoHSCT based on functional status, disease control, and strong support system.  Plan:  -discussed average 67 month median PFS following transplant (per determination trial) with standard  risk disease but that early relapses as well as much more durable remissions >10 years possible with transplant approach in modern therapy era   -discussed my recommendation for early over late transplant; but that late transplant could be considered given standard risk disease   -discussed approximately 2 week hospitalization during which most pts experience moderate chemotherapy related side effects   -discussed approximate day +100 restaging and likely recommendations for Revlimid maintenance for at least 2 years post transplant  and possibly indefinitely or until intolerance or progression   -discussed need for revaccinations starting at day +180   -discussed need for appt and medication compliance following transplant   -discussed need for full time caretaker through day +30   -patient  and family  expressed understanding and all questions answered;   -I will have transplant coordinator contact patient promptly to discuss logistics and address any remaining questions/concerns.    3. Renal Impairment:  Assessment: Likely myeloma-related kidney dysfunction; no contraindication to melphalan with dose adjustment.  Plan:  Monitor renal function and adjust transplant medications as needed.    4. Post-Transplant Planning:  Assessment: Maintenance therapy with Revlimid (lenalidomide) post-transplant is the standard of care to prolong remission.  Plan:  Educate patient about maintenance therapy expectations and potential for long-term remission.  @Route Chart for Scheduling  BMT Route Chart for Scheduling    Treatment Plan Information   OP CyBorD: CYCLOPHOSPHAMIDE PO + ONCE WEEKLY BORTEZOMIB + DEXAMETHASONE Q4W  El Saunders MD   Associated diagnosis: Multiple myeloma not having achieved remission  Beta-2-microglobulin (mg/L): 3.6, Albumin (g/dL): 4.1, ISS: Stage II, High-risk cytogenetics: Absent, LDH: Normal noted on 8/15/2024   Line of treatment: First Line  Treatment Goal: Curative     Upcoming Treatment Dates -  OP CyBorD: CYCLOPHOSPHAMIDE PO + ONCE WEEKLY BORTEZOMIB + DEXAMETHASONE Q4W     3/7/2025       Pre-Medications       palonosetron injection 0.25 mg       Chemotherapy       bortezomib (VELCADE) injection 3.25 mg  3/14/2025       Pre-Medications       palonosetron injection 0.25 mg       Chemotherapy       bortezomib (VELCADE) injection 3.25 mg      Sara De León NP  Section of Multiple Myeloma and Plasma Cell Disorders  Malignant Hematology and Cellular Therapy  [facility]     Total time of this visit, including time spent face to face with patient and/or via video/audio, and also in preparing for today's visit for MDM and documentation. (Medical Decision Making, including consideration of possible diagnoses, management options, complex medical record review, review of diagnostic tests and information, consideration and discussion of significant complications based on comorbidities, and discussion with providers involved with the care of the patient) 57 minutes. Greater than 50% was spent face to face with the patient counseling and coordinating care.

## 2025-03-14 ENCOUNTER — CLINICAL SUPPORT (OUTPATIENT)
Dept: HEMATOLOGY/ONCOLOGY | Facility: CLINIC | Age: 55
End: 2025-03-14
Payer: COMMERCIAL

## 2025-03-14 VITALS
OXYGEN SATURATION: 96 % | HEIGHT: 71 IN | HEART RATE: 69 BPM | DIASTOLIC BLOOD PRESSURE: 79 MMHG | RESPIRATION RATE: 16 BRPM | SYSTOLIC BLOOD PRESSURE: 149 MMHG | TEMPERATURE: 98 F | BODY MASS INDEX: 29.15 KG/M2 | WEIGHT: 208.19 LBS

## 2025-03-14 DIAGNOSIS — Z76.82 STEM CELL TRANSPLANT CANDIDATE: ICD-10-CM

## 2025-03-14 DIAGNOSIS — D84.81 IMMUNODEFICIENCY DUE TO CONDITIONS CLASSIFIED ELSEWHERE: ICD-10-CM

## 2025-03-14 DIAGNOSIS — C90.00 MULTIPLE MYELOMA NOT HAVING ACHIEVED REMISSION: Primary | ICD-10-CM

## 2025-03-14 DIAGNOSIS — T45.1X5A IMMUNODEFICIENCY DUE TO CHEMOTHERAPY: ICD-10-CM

## 2025-03-14 DIAGNOSIS — N18.32 STAGE 3B CHRONIC KIDNEY DISEASE: ICD-10-CM

## 2025-03-14 DIAGNOSIS — D84.821 IMMUNODEFICIENCY DUE TO CHEMOTHERAPY: ICD-10-CM

## 2025-03-14 DIAGNOSIS — Z79.899 IMMUNODEFICIENCY DUE TO CHEMOTHERAPY: ICD-10-CM

## 2025-03-14 RX ORDER — EPINEPHRINE 0.3 MG/.3ML
0.3 INJECTION SUBCUTANEOUS ONCE AS NEEDED
OUTPATIENT
Start: 2025-04-04

## 2025-03-14 RX ORDER — BORTEZOMIB 3.5 MG/1
1.3 INJECTION, POWDER, LYOPHILIZED, FOR SOLUTION INTRAVENOUS; SUBCUTANEOUS
OUTPATIENT
Start: 2025-03-14

## 2025-03-14 RX ORDER — BORTEZOMIB 3.5 MG/1
1.3 INJECTION, POWDER, LYOPHILIZED, FOR SOLUTION INTRAVENOUS; SUBCUTANEOUS
OUTPATIENT
Start: 2025-03-28

## 2025-03-14 RX ORDER — HEPARIN 100 UNIT/ML
500 SYRINGE INTRAVENOUS
OUTPATIENT
Start: 2025-03-28

## 2025-03-14 RX ORDER — EPINEPHRINE 0.3 MG/.3ML
0.3 INJECTION SUBCUTANEOUS ONCE AS NEEDED
OUTPATIENT
Start: 2025-03-28

## 2025-03-14 RX ORDER — ACETAMINOPHEN 325 MG/1
650 TABLET ORAL
OUTPATIENT
Start: 2025-03-21

## 2025-03-14 RX ORDER — SODIUM CHLORIDE 0.9 % (FLUSH) 0.9 %
10 SYRINGE (ML) INJECTION
OUTPATIENT
Start: 2025-03-14

## 2025-03-14 RX ORDER — ONDANSETRON HYDROCHLORIDE 8 MG/1
8 TABLET, FILM COATED ORAL EVERY 8 HOURS PRN
Qty: 60 TABLET | Refills: 6 | Status: SHIPPED | OUTPATIENT
Start: 2025-03-14 | End: 2026-03-14

## 2025-03-14 RX ORDER — HEPARIN 100 UNIT/ML
500 SYRINGE INTRAVENOUS
OUTPATIENT
Start: 2025-04-04

## 2025-03-14 RX ORDER — PROCHLORPERAZINE EDISYLATE 5 MG/ML
10 INJECTION INTRAMUSCULAR; INTRAVENOUS ONCE AS NEEDED
OUTPATIENT
Start: 2025-04-04

## 2025-03-14 RX ORDER — PROCHLORPERAZINE EDISYLATE 5 MG/ML
10 INJECTION INTRAMUSCULAR; INTRAVENOUS ONCE AS NEEDED
OUTPATIENT
Start: 2025-03-14

## 2025-03-14 RX ORDER — DIPHENHYDRAMINE HCL 25 MG
25 CAPSULE ORAL
OUTPATIENT
Start: 2025-03-28

## 2025-03-14 RX ORDER — DIPHENHYDRAMINE HYDROCHLORIDE 50 MG/ML
50 INJECTION, SOLUTION INTRAMUSCULAR; INTRAVENOUS ONCE AS NEEDED
OUTPATIENT
Start: 2025-03-21

## 2025-03-14 RX ORDER — BORTEZOMIB 3.5 MG/1
1.3 INJECTION, POWDER, LYOPHILIZED, FOR SOLUTION INTRAVENOUS; SUBCUTANEOUS
OUTPATIENT
Start: 2025-03-21

## 2025-03-14 RX ORDER — BORTEZOMIB 3.5 MG/1
1.3 INJECTION, POWDER, LYOPHILIZED, FOR SOLUTION INTRAVENOUS; SUBCUTANEOUS
OUTPATIENT
Start: 2025-04-04

## 2025-03-14 RX ORDER — DIPHENHYDRAMINE HCL 25 MG
25 CAPSULE ORAL
OUTPATIENT
Start: 2025-03-14

## 2025-03-14 RX ORDER — PROCHLORPERAZINE EDISYLATE 5 MG/ML
10 INJECTION INTRAMUSCULAR; INTRAVENOUS ONCE AS NEEDED
OUTPATIENT
Start: 2025-03-28

## 2025-03-14 RX ORDER — DIPHENHYDRAMINE HYDROCHLORIDE 50 MG/ML
50 INJECTION, SOLUTION INTRAMUSCULAR; INTRAVENOUS ONCE AS NEEDED
OUTPATIENT
Start: 2025-03-28

## 2025-03-14 RX ORDER — EPINEPHRINE 0.3 MG/.3ML
0.3 INJECTION SUBCUTANEOUS ONCE AS NEEDED
OUTPATIENT
Start: 2025-03-21

## 2025-03-14 RX ORDER — ACETAMINOPHEN 325 MG/1
650 TABLET ORAL
OUTPATIENT
Start: 2025-03-14

## 2025-03-14 RX ORDER — ESCITALOPRAM OXALATE 10 MG/1
10 TABLET ORAL DAILY
Qty: 30 TABLET | Refills: 2 | Status: SHIPPED | OUTPATIENT
Start: 2025-03-14 | End: 2026-03-14

## 2025-03-14 RX ORDER — DIPHENHYDRAMINE HCL 25 MG
25 CAPSULE ORAL
OUTPATIENT
Start: 2025-03-21

## 2025-03-14 RX ORDER — EPINEPHRINE 0.3 MG/.3ML
0.3 INJECTION SUBCUTANEOUS ONCE AS NEEDED
OUTPATIENT
Start: 2025-03-14

## 2025-03-14 RX ORDER — HEPARIN 100 UNIT/ML
500 SYRINGE INTRAVENOUS
OUTPATIENT
Start: 2025-03-21

## 2025-03-14 RX ORDER — LENALIDOMIDE 25 MG/1
25 CAPSULE ORAL DAILY
Qty: 21 CAPSULE | Refills: 0 | Status: ACTIVE | OUTPATIENT
Start: 2025-03-14 | End: 2025-04-04

## 2025-03-14 RX ORDER — HEPARIN 100 UNIT/ML
500 SYRINGE INTRAVENOUS
OUTPATIENT
Start: 2025-03-14

## 2025-03-14 RX ORDER — SODIUM CHLORIDE 0.9 % (FLUSH) 0.9 %
10 SYRINGE (ML) INJECTION
OUTPATIENT
Start: 2025-03-21

## 2025-03-14 RX ORDER — DIPHENHYDRAMINE HYDROCHLORIDE 50 MG/ML
50 INJECTION, SOLUTION INTRAMUSCULAR; INTRAVENOUS ONCE AS NEEDED
OUTPATIENT
Start: 2025-04-04

## 2025-03-14 RX ORDER — SODIUM CHLORIDE 0.9 % (FLUSH) 0.9 %
10 SYRINGE (ML) INJECTION
OUTPATIENT
Start: 2025-04-04

## 2025-03-14 RX ORDER — SODIUM CHLORIDE 0.9 % (FLUSH) 0.9 %
10 SYRINGE (ML) INJECTION
OUTPATIENT
Start: 2025-03-28

## 2025-03-14 RX ORDER — DIPHENHYDRAMINE HYDROCHLORIDE 50 MG/ML
50 INJECTION, SOLUTION INTRAMUSCULAR; INTRAVENOUS ONCE AS NEEDED
OUTPATIENT
Start: 2025-03-14

## 2025-03-14 RX ORDER — ACETAMINOPHEN 325 MG/1
650 TABLET ORAL
OUTPATIENT
Start: 2025-03-28

## 2025-03-14 RX ORDER — PROCHLORPERAZINE EDISYLATE 5 MG/ML
10 INJECTION INTRAMUSCULAR; INTRAVENOUS ONCE AS NEEDED
OUTPATIENT
Start: 2025-03-21

## 2025-03-14 NOTE — PROGRESS NOTES
HEMATOLOGY FOLLOW UP CONSULTATION NOTE    Patient ID: Miky Carrasco is a 54 y.o. male.    Chief Complaint:  Multiple myeloma    HPI:  Patient is a 53-year-old male with past medical history of hyperlipidemia who recently was evaluated by Nephrology for worsening kidney function and underwent further lab work including serum protein electrophoresis which showed findings concerning for possible MGUS versus active multiple myeloma with decline in kidney.  Patient presents to our clinic today for further evaluation.  Voices no acute complaints. Denies bone pain, activity and appetite changes.              Past Medical History:   Diagnosis Date    Disorder of kidney and ureter     Multiple myeloma not having achieved remission        Family History   Problem Relation Name Age of Onset    Hypertension Mother      Diabetes Mother      Skin cancer Mother      Hypertension Father      Cancer Sister         Social History     Socioeconomic History    Marital status:    Tobacco Use    Smoking status: Every Day     Passive exposure: Current    Smokeless tobacco: Current     Types: Chew   Substance and Sexual Activity    Alcohol use: Not Currently    Drug use: Not Currently     Types: Marijuana    Sexual activity: Yes     Social Drivers of Health     Financial Resource Strain: Low Risk  (2/20/2025)    Overall Financial Resource Strain (CARDIA)     Difficulty of Paying Living Expenses: Not hard at all   Food Insecurity: No Food Insecurity (2/20/2025)    Hunger Vital Sign     Worried About Running Out of Food in the Last Year: Never true     Ran Out of Food in the Last Year: Never true   Transportation Needs: No Transportation Needs (2/20/2025)    PRAPARE - Transportation     Lack of Transportation (Medical): No     Lack of Transportation (Non-Medical): No   Physical Activity: Insufficiently Active (2/20/2025)    Exercise Vital Sign     Days of Exercise per Week: 1 day     Minutes of Exercise per Session: 30 min    Stress: No Stress Concern Present (2/20/2025)    [employer] - Occupational Stress Questionnaire     Feeling of Stress : Only a little   Housing Stability: Low Risk  (2/20/2025)    Housing Stability Vital Sign     Unable to Pay for Housing in the Last Year: No     Number of Times Moved in the Last Year: 0     Homeless in the Last Year: No         No past surgical history on file.      Review of systems:  Review of Systems   Constitutional:  Negative for activity change, appetite change, chills, diaphoresis, fatigue and unexpected weight change.   HENT:  Negative for congestion, facial swelling, hearing loss, mouth sores, trouble swallowing and voice change.    Eyes:  Negative for photophobia, pain, discharge and itching.   Respiratory:  Negative for apnea, cough, choking, chest tightness and shortness of breath.    Cardiovascular:  Negative for chest pain, palpitations and leg swelling.   Gastrointestinal:  Negative for abdominal distention, abdominal pain, anal bleeding and blood in stool.   Endocrine: Negative for cold intolerance, heat intolerance, polydipsia and polyphagia.   Genitourinary:  Negative for difficulty urinating, dysuria, flank pain and hematuria.   Musculoskeletal:  Negative for arthralgias, back pain, joint swelling, myalgias, neck pain and neck stiffness.   Skin:  Negative for color change, pallor and wound.   Allergic/Immunologic: Negative for environmental allergies, food allergies and immunocompromised state.   Neurological:  Negative for dizziness, seizures, facial asymmetry, speech difficulty, light-headedness, numbness and headaches.   Hematological:  Negative for adenopathy. Does not bruise/bleed easily.   Psychiatric/Behavioral:  Negative for agitation, behavioral problems, confusion, decreased concentration and sleep disturbance.                                Physical Exam  Vitals and nursing note reviewed.   Constitutional:       General: He is not in  acute distress.     Appearance: Normal appearance. He is not ill-appearing.   HENT:      Head: Normocephalic and atraumatic.      Nose: No congestion or rhinorrhea.   Eyes:      General: No scleral icterus.     Extraocular Movements: Extraocular movements intact.      Pupils: Pupils are equal, round, and reactive to light.   Cardiovascular:      Rate and Rhythm: Normal rate and regular rhythm.      Pulses: Normal pulses.      Heart sounds: Normal heart sounds. No murmur heard.     No gallop.   Pulmonary:      Effort: Pulmonary effort is normal. No respiratory distress.      Breath sounds: Normal breath sounds. No stridor. No wheezing or rhonchi.   Abdominal:      General: Bowel sounds are normal. There is no distension.      Palpations: There is no mass.      Tenderness: There is no abdominal tenderness. There is no guarding.   Musculoskeletal:         General: No swelling, tenderness, deformity or signs of injury. Normal range of motion.      Cervical back: Normal range of motion and neck supple. No rigidity. No muscular tenderness.      Right lower leg: No edema.      Left lower leg: No edema.   Skin:     General: Skin is warm.      Coloration: Skin is not jaundiced or pale.      Findings: No bruising or lesion.   Neurological:      General: No focal deficit present.      Mental Status: He is alert and oriented to person, place, and time.      Cranial Nerves: No cranial nerve deficit.      Sensory: No sensory deficit.      Motor: No weakness.      Gait: Gait normal.   Psychiatric:         Mood and Affect: Mood normal.         Behavior: Behavior normal.         Thought Content: Thought content normal.     There were no vitals filed for this visit.      There is no height or weight on file to calculate BSA.    Assessment/Plan:      ECOG 1    Multiple myeloma:    == reviewed prior lab work done with Nephrology.  Patient is noted to have worsening renal failure and also has presence of monoclonal band on immuno  fixation studies.  I will initiate workup for multiple myeloma and will include a bone marrow biopsy as well as a PET scan to rule out any osteolytic lesions.  Discussed these findings in detail with patient giving him opportunity to ask questions  == 8/18/24:  Bone marrow biopsy done showed increased number of plasma cells.  No percentage mentioned but reported to be more than normal and in the smoldering range.  With evidence of worsening renal failure along with elevated kappa free light chains and kappa/lambda free light chain ratio findings are consistent with active multiple myeloma.  Due to decline in kidney function my initial plan would be to initiate CyBorD.  Patient reports that he is on a job which requires lot of traveling and unable to come to clinics  regularly do receive his treatment.   ==9/23/24:  Patient and wife here today to discuss upcoming chemotherapy/immunotherapy. An extensive discussion was had which included a thorough discussion of potential side effect profile, risk versus benefits, and expected outcomes.  Risks, including but not limited to, possible hair loss, bone marrow damage, damage to body organs, allergic reactions, sterility, nausea/vomiting, constipation/diarrhea, sores in the mouth, secondary cancers, and rarely death were all discuss.  Specific side effects pertaining to their chemotherapy/immunotherapy medications were discussed as well.  The patient agrees with the plan and all questions and their support systems questions have been answered to their satisfaction.  Premedications were prescribed and patient was educated on appropriate usage.  Patient was educated on when to call, and given the number to call, or to notify the provider including but not limited to:  Persistent nausea and vomiting, dehydration, fever greater than 100.4 lasting over 1 hour induration or isolated feeders greater than 101, rash while on active chemotherapy or immunotherapy, severe pain or new  onset pain not controlled by current pain medication regimen.   ==10/04/2024: Tolerating chemotherapy well, pt has leg cramps has had all of his life worse at night, potassium mg normal, discussed medication, pt would like to try otc topicals such as biofreeze or icyhot with lidocaine first and if not helpful will notify clinic and will send our rx.  No other complaints, will continue chemotherapy, plan to see next week and if continues to tolerate well plan to see every other week.  ==10/11/2024: Magnesium wnl, but hypophosphatemia noted, pt denies diarrhea or antacid use.  Will start gentle supplementation with po multivitamin, discussed with patient his renal function and may need po phosphate if repeat labs show continued low phosphorus, but black box warning in CKD for po phosphate.  Will try multivitamin containing phos as well as increasing dietary intake - reviewed dietary sources of phosphorus.  Will also check vitamin D level, Vit D deficiency can cause/worsen hypophosphatemia and recheck phos level to determine if transient.  Pt is asymptomatic at this time has occasional leg cramps for all of his life, no increase or other symptoms.  Labs reviewed will continue chemotherapy   ==11/22/2024: Phos wnl, tolerating Cybor D well, leg cramps improved with baclofen, labs reviewed patient is cleared for chemotherapy. Will now also make referral to BMT team in [place].   ==12/27/2024: Has appt with Dr. Cardoso 1/16/2025, tolerating CyBorD well.  May use labs from BMT appt on 1/16/25 for chemo scheduled on 1/17/2024, will need cbc cmp. Denies complaints, will continue with CyBorD.  ==02/21/2025: Tolerating CyBorD well, next appt with Dr. Cardoso is on 2/27/2025 for transplant eval. Last myeloma labs were on 1/24/25 so I will have drawn today so they will be resulted for his visit next week. Whole body PET ct reviewed which showed bony degenerative changes without evidence of bony destruction, no hypermetabolic  activity seen and no interval changes from prior study.  Will continue CyBorD at this time.   ==03/07/2025: Pt recently had visit with Dr. Meredith with transplant who recommends patient to switch to Dina+VRD and start Xgeva. He has had dental clearance which was sent to [facility] will be scanned in chart.  Plan to send Dina+VRD as well as xgeva for approval and continue CyborD until approved. Will add hep panel and type and screen to next labs in anticipation of starting Dina  ==03/14/2025:  Patient here today to discuss upcoming chemotherapy/immunotherapy. An extensive discussion was had which included a thorough discussion of potential side effect profile, risk versus benefits, and expected outcomes.  Risks, including but not limited to, possible hair loss, bone marrow damage, damage to body organs, allergic reactions, sterility, nausea/vomiting, constipation/diarrhea, sores in the mouth, secondary cancers, and rarely death were all discuss.  Specific side effects pertaining to their chemotherapy/immunotherapy medications were discussed as well.  The patient agrees with the plan and all questions and their support systems questions have been answered to their satisfaction.  Premedications were prescribed and patient was educated on appropriate usage.  Patient was educated on when to call, and given the number to call, or to notify the provider including but not limited to:  Persistent nausea and vomiting, dehydration, fever greater than 100.4 lasting over 1 hour induration or isolated feeders greater than 101, rash while on active chemotherapy or immunotherapy, severe pain or new onset pain not controlled by current pain medication regimen. Starting Dina-VRD and xgeva today, pt doesn't have revlimid yet, will cont cytoxan until he receives it.    2. Prophylaxis for infection prevention  == Continue Bactrim and acyclovir    3. Hypophosphatemia  ==Asymptomatic, normal magnesium level - pt has had leg cramps all of life,  no increase previous phos levels wnl  ==Recheck to determine if transient, avoid antacids, check vitamin D level  ==encourage po intake, will start multivitamin containing phosphorus, may need higher dose if does not resolve, will prescribe with caution given renal function  == 11/22/24: Resolved    Plan:  stop  CyBorD - until start Dina VRD  Send dina vrd for approval - sent  weekly treatment with labs: Cbc cmp q weekly   NP appt in 1 weeks   Continue Bactrim and valtrex prophylaxis  Myeloma labs q 4 weeks           Total time spent in counseling and discussion about further management options including relevant lab work, treatment,  prognosis, medications and intended side effects was more than 40 minutes. More than 50 % of the time was spent in counseling and coordination of care.  This includes face to face time and non-face to face time preparing to see the patient (eg, review of tests), Obtaining and/or reviewing separately obtained history, Documenting clinical information in the electronic or other health record, Independently interpreting resultsand communicating results to the patient/family/caregiver, or Care coordination.

## 2025-03-20 ENCOUNTER — PATIENT MESSAGE (OUTPATIENT)
Dept: HEMATOLOGY/ONCOLOGY | Facility: CLINIC | Age: 55
End: 2025-03-20

## 2025-03-21 ENCOUNTER — OFFICE VISIT (OUTPATIENT)
Dept: HEMATOLOGY/ONCOLOGY | Facility: CLINIC | Age: 55
End: 2025-03-21
Payer: COMMERCIAL

## 2025-03-21 VITALS
OXYGEN SATURATION: 97 % | WEIGHT: 204.13 LBS | BODY MASS INDEX: 28.58 KG/M2 | RESPIRATION RATE: 18 BRPM | HEIGHT: 71 IN | SYSTOLIC BLOOD PRESSURE: 144 MMHG | HEART RATE: 69 BPM | DIASTOLIC BLOOD PRESSURE: 79 MMHG

## 2025-03-21 DIAGNOSIS — C90.00 MULTIPLE MYELOMA NOT HAVING ACHIEVED REMISSION: Primary | ICD-10-CM

## 2025-03-21 DIAGNOSIS — D84.81 IMMUNODEFICIENCY DUE TO CONDITIONS CLASSIFIED ELSEWHERE: ICD-10-CM

## 2025-03-21 DIAGNOSIS — Z76.82 STEM CELL TRANSPLANT CANDIDATE: ICD-10-CM

## 2025-03-21 NOTE — PROGRESS NOTES
HEMATOLOGY FOLLOW UP CONSULTATION NOTE    Patient ID: Miky Carrasco is a 54 y.o. male.    Chief Complaint:  Multiple myeloma    HPI:  Patient is a 53-year-old male with past medical history of hyperlipidemia who recently was evaluated by Nephrology for worsening kidney function and underwent further lab work including serum protein electrophoresis which showed findings concerning for possible MGUS versus active multiple myeloma with decline in kidney.  Patient presents to our clinic today for further evaluation.  Voices no acute complaints. Denies bone pain, activity and appetite changes.              Past Medical History:   Diagnosis Date    Disorder of kidney and ureter     Multiple myeloma not having achieved remission        Family History   Problem Relation Name Age of Onset    Hypertension Mother      Diabetes Mother      Skin cancer Mother      Hypertension Father      Cancer Sister         Social History     Socioeconomic History    Marital status:    Tobacco Use    Smoking status: Every Day     Passive exposure: Current    Smokeless tobacco: Current     Types: Chew   Substance and Sexual Activity    Alcohol use: Not Currently    Drug use: Not Currently     Types: Marijuana    Sexual activity: Yes     Social Drivers of Health     Financial Resource Strain: Low Risk  (2/20/2025)    Overall Financial Resource Strain (CARDIA)     Difficulty of Paying Living Expenses: Not hard at all   Food Insecurity: No Food Insecurity (2/20/2025)    Hunger Vital Sign     Worried About Running Out of Food in the Last Year: Never true     Ran Out of Food in the Last Year: Never true   Transportation Needs: No Transportation Needs (2/20/2025)    PRAPARE - Transportation     Lack of Transportation (Medical): No     Lack of Transportation (Non-Medical): No   Physical Activity: Insufficiently Active (2/20/2025)    Exercise Vital Sign     Days of Exercise per Week: 1 day     Minutes of Exercise per Session: 30 min    Stress: No Stress Concern Present (2/20/2025)    [employer] - Occupational Stress Questionnaire     Feeling of Stress : Only a little   Housing Stability: Low Risk  (2/20/2025)    Housing Stability Vital Sign     Unable to Pay for Housing in the Last Year: No     Number of Times Moved in the Last Year: 0     Homeless in the Last Year: No         No past surgical history on file.      Review of systems:  Review of Systems   Constitutional:  Negative for activity change, appetite change, chills, diaphoresis, fatigue and unexpected weight change.   HENT:  Negative for congestion, facial swelling, hearing loss, mouth sores, trouble swallowing and voice change.    Eyes:  Negative for photophobia, pain, discharge and itching.   Respiratory:  Negative for apnea, cough, choking, chest tightness and shortness of breath.    Cardiovascular:  Negative for chest pain, palpitations and leg swelling.   Gastrointestinal:  Negative for abdominal distention, abdominal pain, anal bleeding and blood in stool.   Endocrine: Negative for cold intolerance, heat intolerance, polydipsia and polyphagia.   Genitourinary:  Negative for difficulty urinating, dysuria, flank pain and hematuria.   Musculoskeletal:  Negative for arthralgias, back pain, joint swelling, myalgias, neck pain and neck stiffness.   Skin:  Negative for color change, pallor and wound.   Allergic/Immunologic: Negative for environmental allergies, food allergies and immunocompromised state.   Neurological:  Negative for dizziness, seizures, facial asymmetry, speech difficulty, light-headedness, numbness and headaches.   Hematological:  Negative for adenopathy. Does not bruise/bleed easily.   Psychiatric/Behavioral:  Negative for agitation, behavioral problems, confusion, decreased concentration and sleep disturbance.                                Physical Exam  Vitals and nursing note reviewed.   Constitutional:       General: He is not in  acute distress.     Appearance: Normal appearance. He is not ill-appearing.   HENT:      Head: Normocephalic and atraumatic.      Nose: No congestion or rhinorrhea.   Eyes:      General: No scleral icterus.     Extraocular Movements: Extraocular movements intact.      Pupils: Pupils are equal, round, and reactive to light.   Cardiovascular:      Rate and Rhythm: Normal rate and regular rhythm.      Pulses: Normal pulses.      Heart sounds: Normal heart sounds. No murmur heard.     No gallop.   Pulmonary:      Effort: Pulmonary effort is normal. No respiratory distress.      Breath sounds: Normal breath sounds. No stridor. No wheezing or rhonchi.   Abdominal:      General: Bowel sounds are normal. There is no distension.      Palpations: There is no mass.      Tenderness: There is no abdominal tenderness. There is no guarding.   Musculoskeletal:         General: No swelling, tenderness, deformity or signs of injury. Normal range of motion.      Cervical back: Normal range of motion and neck supple. No rigidity. No muscular tenderness.      Right lower leg: No edema.      Left lower leg: No edema.   Skin:     General: Skin is warm.      Coloration: Skin is not jaundiced or pale.      Findings: No bruising or lesion.   Neurological:      General: No focal deficit present.      Mental Status: He is alert and oriented to person, place, and time.      Cranial Nerves: No cranial nerve deficit.      Sensory: No sensory deficit.      Motor: No weakness.      Gait: Gait normal.   Psychiatric:         Mood and Affect: Mood normal.         Behavior: Behavior normal.         Thought Content: Thought content normal.       There were no vitals filed for this visit.      There is no height or weight on file to calculate BSA.    Assessment/Plan:      ECOG 1    Multiple myeloma:    == reviewed prior lab work done with Nephrology.  Patient is noted to have worsening renal failure and also has presence of monoclonal band on immuno  fixation studies.  I will initiate workup for multiple myeloma and will include a bone marrow biopsy as well as a PET scan to rule out any osteolytic lesions.  Discussed these findings in detail with patient giving him opportunity to ask questions  == 8/18/24:  Bone marrow biopsy done showed increased number of plasma cells.  No percentage mentioned but reported to be more than normal and in the smoldering range.  With evidence of worsening renal failure along with elevated kappa free light chains and kappa/lambda free light chain ratio findings are consistent with active multiple myeloma.  Due to decline in kidney function my initial plan would be to initiate CyBorD.  Patient reports that he is on a job which requires lot of traveling and unable to come to clinics  regularly do receive his treatment.   ==9/23/24:  Patient and wife here today to discuss upcoming chemotherapy/immunotherapy. An extensive discussion was had which included a thorough discussion of potential side effect profile, risk versus benefits, and expected outcomes.  Risks, including but not limited to, possible hair loss, bone marrow damage, damage to body organs, allergic reactions, sterility, nausea/vomiting, constipation/diarrhea, sores in the mouth, secondary cancers, and rarely death were all discuss.  Specific side effects pertaining to their chemotherapy/immunotherapy medications were discussed as well.  The patient agrees with the plan and all questions and their support systems questions have been answered to their satisfaction.  Premedications were prescribed and patient was educated on appropriate usage.  Patient was educated on when to call, and given the number to call, or to notify the provider including but not limited to:  Persistent nausea and vomiting, dehydration, fever greater than 100.4 lasting over 1 hour induration or isolated feeders greater than 101, rash while on active chemotherapy or immunotherapy, severe pain or new  onset pain not controlled by current pain medication regimen.   ==10/04/2024: Tolerating chemotherapy well, pt has leg cramps has had all of his life worse at night, potassium mg normal, discussed medication, pt would like to try otc topicals such as biofreeze or icyhot with lidocaine first and if not helpful will notify clinic and will send our rx.  No other complaints, will continue chemotherapy, plan to see next week and if continues to tolerate well plan to see every other week.  ==10/11/2024: Magnesium wnl, but hypophosphatemia noted, pt denies diarrhea or antacid use.  Will start gentle supplementation with po multivitamin, discussed with patient his renal function and may need po phosphate if repeat labs show continued low phosphorus, but black box warning in CKD for po phosphate.  Will try multivitamin containing phos as well as increasing dietary intake - reviewed dietary sources of phosphorus.  Will also check vitamin D level, Vit D deficiency can cause/worsen hypophosphatemia and recheck phos level to determine if transient.  Pt is asymptomatic at this time has occasional leg cramps for all of his life, no increase or other symptoms.  Labs reviewed will continue chemotherapy   ==11/22/2024: Phos wnl, tolerating Cybor D well, leg cramps improved with baclofen, labs reviewed patient is cleared for chemotherapy. Will now also make referral to BMT team in [place].   ==12/27/2024: Has appt with Dr. Cardoso 1/16/2025, tolerating CyBorD well.  May use labs from BMT appt on 1/16/25 for chemo scheduled on 1/17/2024, will need cbc cmp. Denies complaints, will continue with CyBorD.  ==02/21/2025: Tolerating CyBorD well, next appt with Dr. Cardoso is on 2/27/2025 for transplant eval. Last myeloma labs were on 1/24/25 so I will have drawn today so they will be resulted for his visit next week. Whole body PET ct reviewed which showed bony degenerative changes without evidence of bony destruction, no hypermetabolic  activity seen and no interval changes from prior study.  Will continue CyBorD at this time.   ==03/07/2025: Pt recently had visit with Dr. Meredith with transplant who recommends patient to switch to Dina+VRD and start Xgeva. He has had dental clearance which was sent to [facility] will be scanned in chart.  Plan to send Dina+VRD as well as xgeva for approval and continue CyborD until approved. Will add hep panel and type and screen to next labs in anticipation of starting Dina  ==03/14/2025:  Patient here today to discuss upcoming chemotherapy/immunotherapy. An extensive discussion was had which included a thorough discussion of potential side effect profile, risk versus benefits, and expected outcomes.  Risks, including but not limited to, possible hair loss, bone marrow damage, damage to body organs, allergic reactions, sterility, nausea/vomiting, constipation/diarrhea, sores in the mouth, secondary cancers, and rarely death were all discuss.  Specific side effects pertaining to their chemotherapy/immunotherapy medications were discussed as well.  The patient agrees with the plan and all questions and their support systems questions have been answered to their satisfaction.  Premedications were prescribed and patient was educated on appropriate usage.  Patient was educated on when to call, and given the number to call, or to notify the provider including but not limited to:  Persistent nausea and vomiting, dehydration, fever greater than 100.4 lasting over 1 hour induration or isolated feeders greater than 101, rash while on active chemotherapy or immunotherapy, severe pain or new onset pain not controlled by current pain medication regimen. Starting Dina-VRD and xgeva today, pt doesn't have revlimid yet, will cont cytoxan until he receives it.  ==03/21/2025: Tolerated first cycle of VRD well, occasional headaches relived with tylenol and caffeine, labs reviewed, pt is cleared for treatment    2. Prophylaxis for  infection prevention  == Continue Bactrim and acyclovir    3. Hypophosphatemia  ==Asymptomatic, normal magnesium level - pt has had leg cramps all of life, no increase previous phos levels wnl  ==Recheck to determine if transient, avoid antacids, check vitamin D level  ==encourage po intake, will start multivitamin containing phosphorus, may need higher dose if does not resolve, will prescribe with caution given renal function  == 11/22/24: Resolved    Plan:  stop  CyBorD - until start Dina VRD  Continue Dina-VRD  Cbc cmp weekly, myeloma labs q 4 weeks  NP appt in 2 weeks   Continue Bactrim and valtrex prophylaxis  Continue Xgeva - last admin on 3/14/2025        Total time spent in counseling and discussion about further management options including relevant lab work, treatment,  prognosis, medications and intended side effects was more than 40 minutes. More than 50 % of the time was spent in counseling and coordination of care.  This includes face to face time and non-face to face time preparing to see the patient (eg, review of tests), Obtaining and/or reviewing separately obtained history, Documenting clinical information in the electronic or other health record, Independently interpreting resultsand communicating results to the patient/family/caregiver, or Care coordination.

## 2025-04-04 ENCOUNTER — OFFICE VISIT (OUTPATIENT)
Dept: HEMATOLOGY/ONCOLOGY | Facility: CLINIC | Age: 55
End: 2025-04-04
Payer: COMMERCIAL

## 2025-04-04 VITALS
RESPIRATION RATE: 16 BRPM | HEIGHT: 71 IN | OXYGEN SATURATION: 94 % | BODY MASS INDEX: 28.73 KG/M2 | TEMPERATURE: 98 F | HEART RATE: 71 BPM | WEIGHT: 205.19 LBS | SYSTOLIC BLOOD PRESSURE: 126 MMHG | DIASTOLIC BLOOD PRESSURE: 77 MMHG

## 2025-04-04 DIAGNOSIS — C90.00 MULTIPLE MYELOMA NOT HAVING ACHIEVED REMISSION: Primary | ICD-10-CM

## 2025-04-04 DIAGNOSIS — D84.81 IMMUNODEFICIENCY DUE TO CONDITIONS CLASSIFIED ELSEWHERE: ICD-10-CM

## 2025-04-04 DIAGNOSIS — E87.1 HYPONATREMIA: ICD-10-CM

## 2025-04-04 DIAGNOSIS — Z91.09 ENVIRONMENTAL ALLERGIES: ICD-10-CM

## 2025-04-04 DIAGNOSIS — Z76.82 STEM CELL TRANSPLANT CANDIDATE: ICD-10-CM

## 2025-04-04 DIAGNOSIS — H91.90 HEARING LOSS, UNSPECIFIED HEARING LOSS TYPE, UNSPECIFIED LATERALITY: ICD-10-CM

## 2025-04-04 DIAGNOSIS — N18.32 STAGE 3B CHRONIC KIDNEY DISEASE: ICD-10-CM

## 2025-04-04 RX ORDER — BORTEZOMIB 3.5 MG/1
1.3 INJECTION, POWDER, LYOPHILIZED, FOR SOLUTION INTRAVENOUS; SUBCUTANEOUS
OUTPATIENT
Start: 2025-04-11

## 2025-04-04 RX ORDER — ACETAMINOPHEN 325 MG/1
650 TABLET ORAL
OUTPATIENT
Start: 2025-04-18

## 2025-04-04 RX ORDER — HEPARIN 100 UNIT/ML
500 SYRINGE INTRAVENOUS
OUTPATIENT
Start: 2025-04-18

## 2025-04-04 RX ORDER — EPINEPHRINE 0.3 MG/.3ML
0.3 INJECTION SUBCUTANEOUS ONCE AS NEEDED
OUTPATIENT
Start: 2025-04-18

## 2025-04-04 RX ORDER — HEPARIN 100 UNIT/ML
500 SYRINGE INTRAVENOUS
OUTPATIENT
Start: 2025-04-04

## 2025-04-04 RX ORDER — BORTEZOMIB 3.5 MG/1
1.3 INJECTION, POWDER, LYOPHILIZED, FOR SOLUTION INTRAVENOUS; SUBCUTANEOUS
OUTPATIENT
Start: 2025-04-04

## 2025-04-04 RX ORDER — BORTEZOMIB 3.5 MG/1
1.3 INJECTION, POWDER, LYOPHILIZED, FOR SOLUTION INTRAVENOUS; SUBCUTANEOUS
OUTPATIENT
Start: 2025-04-25

## 2025-04-04 RX ORDER — ACETAMINOPHEN 325 MG/1
650 TABLET ORAL
OUTPATIENT
Start: 2025-04-25

## 2025-04-04 RX ORDER — DIPHENHYDRAMINE HCL 25 MG
25 CAPSULE ORAL
OUTPATIENT
Start: 2025-04-25

## 2025-04-04 RX ORDER — DIPHENHYDRAMINE HYDROCHLORIDE 50 MG/ML
50 INJECTION, SOLUTION INTRAMUSCULAR; INTRAVENOUS ONCE AS NEEDED
OUTPATIENT
Start: 2025-04-25

## 2025-04-04 RX ORDER — DIPHENHYDRAMINE HYDROCHLORIDE 50 MG/ML
50 INJECTION, SOLUTION INTRAMUSCULAR; INTRAVENOUS ONCE AS NEEDED
OUTPATIENT
Start: 2025-04-18

## 2025-04-04 RX ORDER — HEPARIN 100 UNIT/ML
500 SYRINGE INTRAVENOUS
OUTPATIENT
Start: 2025-04-25

## 2025-04-04 RX ORDER — DIPHENHYDRAMINE HCL 25 MG
25 CAPSULE ORAL
OUTPATIENT
Start: 2025-04-11

## 2025-04-04 RX ORDER — PROCHLORPERAZINE EDISYLATE 5 MG/ML
10 INJECTION INTRAMUSCULAR; INTRAVENOUS ONCE AS NEEDED
OUTPATIENT
Start: 2025-04-04

## 2025-04-04 RX ORDER — DIPHENHYDRAMINE HCL 25 MG
25 CAPSULE ORAL
OUTPATIENT
Start: 2025-04-18

## 2025-04-04 RX ORDER — DIPHENHYDRAMINE HYDROCHLORIDE 50 MG/ML
50 INJECTION, SOLUTION INTRAMUSCULAR; INTRAVENOUS ONCE AS NEEDED
OUTPATIENT
Start: 2025-04-04

## 2025-04-04 RX ORDER — SODIUM CHLORIDE 0.9 % (FLUSH) 0.9 %
10 SYRINGE (ML) INJECTION
OUTPATIENT
Start: 2025-04-11

## 2025-04-04 RX ORDER — PROCHLORPERAZINE EDISYLATE 5 MG/ML
10 INJECTION INTRAMUSCULAR; INTRAVENOUS ONCE AS NEEDED
OUTPATIENT
Start: 2025-04-18

## 2025-04-04 RX ORDER — EPINEPHRINE 0.3 MG/.3ML
0.3 INJECTION SUBCUTANEOUS ONCE AS NEEDED
OUTPATIENT
Start: 2025-04-25

## 2025-04-04 RX ORDER — PROCHLORPERAZINE EDISYLATE 5 MG/ML
10 INJECTION INTRAMUSCULAR; INTRAVENOUS ONCE AS NEEDED
OUTPATIENT
Start: 2025-04-25

## 2025-04-04 RX ORDER — SODIUM CHLORIDE 0.9 % (FLUSH) 0.9 %
10 SYRINGE (ML) INJECTION
OUTPATIENT
Start: 2025-04-25

## 2025-04-04 RX ORDER — SODIUM CHLORIDE 0.9 % (FLUSH) 0.9 %
10 SYRINGE (ML) INJECTION
OUTPATIENT
Start: 2025-04-04

## 2025-04-04 RX ORDER — EPINEPHRINE 0.3 MG/.3ML
0.3 INJECTION SUBCUTANEOUS ONCE AS NEEDED
OUTPATIENT
Start: 2025-04-04

## 2025-04-04 RX ORDER — PROCHLORPERAZINE EDISYLATE 5 MG/ML
10 INJECTION INTRAMUSCULAR; INTRAVENOUS ONCE AS NEEDED
OUTPATIENT
Start: 2025-04-11

## 2025-04-04 RX ORDER — EPINEPHRINE 0.3 MG/.3ML
0.3 INJECTION SUBCUTANEOUS ONCE AS NEEDED
OUTPATIENT
Start: 2025-04-11

## 2025-04-04 RX ORDER — DIPHENHYDRAMINE HYDROCHLORIDE 50 MG/ML
50 INJECTION, SOLUTION INTRAMUSCULAR; INTRAVENOUS ONCE AS NEEDED
OUTPATIENT
Start: 2025-04-11

## 2025-04-04 RX ORDER — HEPARIN 100 UNIT/ML
500 SYRINGE INTRAVENOUS
OUTPATIENT
Start: 2025-04-11

## 2025-04-04 RX ORDER — ACETAMINOPHEN 325 MG/1
650 TABLET ORAL
OUTPATIENT
Start: 2025-04-11

## 2025-04-04 RX ORDER — ACETAMINOPHEN 325 MG/1
650 TABLET ORAL
OUTPATIENT
Start: 2025-04-04

## 2025-04-04 RX ORDER — SODIUM CHLORIDE 0.9 % (FLUSH) 0.9 %
10 SYRINGE (ML) INJECTION
OUTPATIENT
Start: 2025-04-18

## 2025-04-04 RX ORDER — BORTEZOMIB 3.5 MG/1
1.3 INJECTION, POWDER, LYOPHILIZED, FOR SOLUTION INTRAVENOUS; SUBCUTANEOUS
OUTPATIENT
Start: 2025-04-18

## 2025-04-04 RX ORDER — DIPHENHYDRAMINE HCL 25 MG
25 CAPSULE ORAL
OUTPATIENT
Start: 2025-04-04

## 2025-04-04 NOTE — PROGRESS NOTES
HEMATOLOGY FOLLOW UP CONSULTATION NOTE    Patient ID: Miky Carrasco is a 54 y.o. male.    Chief Complaint:  Multiple myeloma    HPI:  Patient is a 53-year-old male with past medical history of hyperlipidemia who recently was evaluated by Nephrology for worsening kidney function and underwent further lab work including serum protein electrophoresis which showed findings concerning for possible MGUS versus active multiple myeloma with decline in kidney.  Patient presents to our clinic today for further evaluation.  Voices no acute complaints. Denies bone pain, activity and appetite changes.              Past Medical History:   Diagnosis Date    Disorder of kidney and ureter     Multiple myeloma not having achieved remission        Family History   Problem Relation Name Age of Onset    Hypertension Mother      Diabetes Mother      Skin cancer Mother      Hypertension Father      Cancer Sister         Social History     Socioeconomic History    Marital status:    Tobacco Use    Smoking status: Never     Passive exposure: Current    Smokeless tobacco: Former     Types: Chew   Substance and Sexual Activity    Alcohol use: Not Currently    Drug use: Not Currently     Types: Marijuana    Sexual activity: Yes     Social Drivers of Health     Financial Resource Strain: Low Risk  (2/20/2025)    Overall Financial Resource Strain (CARDIA)     Difficulty of Paying Living Expenses: Not hard at all   Food Insecurity: No Food Insecurity (2/20/2025)    Hunger Vital Sign     Worried About Running Out of Food in the Last Year: Never true     Ran Out of Food in the Last Year: Never true   Transportation Needs: No Transportation Needs (2/20/2025)    PRAPARE - Transportation     Lack of Transportation (Medical): No     Lack of Transportation (Non-Medical): No   Physical Activity: Insufficiently Active (2/20/2025)    Exercise Vital Sign     Days of Exercise per Week: 1 day     Minutes of Exercise per Session: 30 min   Stress: No  Stress Concern Present (2/20/2025)    [employer] - Occupational Stress Questionnaire     Feeling of Stress : Only a little   Housing Stability: Low Risk  (2/20/2025)    Housing Stability Vital Sign     Unable to Pay for Housing in the Last Year: No     Number of Times Moved in the Last Year: 0     Homeless in the Last Year: No         No past surgical history on file.      Review of systems:  Review of Systems   Constitutional:  Negative for activity change, appetite change, chills, diaphoresis, fatigue and unexpected weight change.   HENT:  Negative for congestion, facial swelling, hearing loss, mouth sores, trouble swallowing and voice change.    Eyes:  Negative for photophobia, pain, discharge and itching.   Respiratory:  Negative for apnea, cough, choking, chest tightness and shortness of breath.    Cardiovascular:  Negative for chest pain, palpitations and leg swelling.   Gastrointestinal:  Negative for abdominal distention, abdominal pain, anal bleeding and blood in stool.   Endocrine: Negative for cold intolerance, heat intolerance, polydipsia and polyphagia.   Genitourinary:  Negative for difficulty urinating, dysuria, flank pain and hematuria.   Musculoskeletal:  Negative for arthralgias, back pain, joint swelling, myalgias, neck pain and neck stiffness.   Skin:  Negative for color change, pallor and wound.   Allergic/Immunologic: Negative for environmental allergies, food allergies and immunocompromised state.   Neurological:  Negative for dizziness, seizures, facial asymmetry, speech difficulty, light-headedness, numbness and headaches.   Hematological:  Negative for adenopathy. Does not bruise/bleed easily.   Psychiatric/Behavioral:  Negative for agitation, behavioral problems, confusion, decreased concentration and sleep disturbance.                                Physical Exam  Vitals and nursing note reviewed.   Constitutional:       General: He is not in acute  distress.     Appearance: Normal appearance. He is not ill-appearing.   HENT:      Head: Normocephalic and atraumatic.      Nose: No congestion or rhinorrhea.   Eyes:      General: No scleral icterus.     Extraocular Movements: Extraocular movements intact.      Pupils: Pupils are equal, round, and reactive to light.   Cardiovascular:      Rate and Rhythm: Normal rate and regular rhythm.      Pulses: Normal pulses.      Heart sounds: Normal heart sounds. No murmur heard.     No gallop.   Pulmonary:      Effort: Pulmonary effort is normal. No respiratory distress.      Breath sounds: Normal breath sounds. No stridor. No wheezing or rhonchi.   Abdominal:      General: Bowel sounds are normal. There is no distension.      Palpations: There is no mass.      Tenderness: There is no abdominal tenderness. There is no guarding.   Musculoskeletal:         General: No swelling, tenderness, deformity or signs of injury. Normal range of motion.      Cervical back: Normal range of motion and neck supple. No rigidity. No muscular tenderness.      Right lower leg: No edema.      Left lower leg: No edema.   Skin:     General: Skin is warm.      Coloration: Skin is not jaundiced or pale.      Findings: No bruising or lesion.   Neurological:      General: No focal deficit present.      Mental Status: He is alert and oriented to person, place, and time.      Cranial Nerves: No cranial nerve deficit.      Sensory: No sensory deficit.      Motor: No weakness.      Gait: Gait normal.   Psychiatric:         Mood and Affect: Mood normal.         Behavior: Behavior normal.         Thought Content: Thought content normal.     There were no vitals filed for this visit.      There is no height or weight on file to calculate BSA.    Assessment/Plan:      ECOG 1    Multiple myeloma:    == reviewed prior lab work done with Nephrology.  Patient is noted to have worsening renal failure and also has presence of monoclonal band on immuno fixation  studies.  I will initiate workup for multiple myeloma and will include a bone marrow biopsy as well as a PET scan to rule out any osteolytic lesions.  Discussed these findings in detail with patient giving him opportunity to ask questions  == 8/18/24:  Bone marrow biopsy done showed increased number of plasma cells.  No percentage mentioned but reported to be more than normal and in the smoldering range.  With evidence of worsening renal failure along with elevated kappa free light chains and kappa/lambda free light chain ratio findings are consistent with active multiple myeloma.  Due to decline in kidney function my initial plan would be to initiate CyBorD.  Patient reports that he is on a job which requires lot of traveling and unable to come to clinics  regularly do receive his treatment.   ==9/23/24:  Patient and wife here today to discuss upcoming chemotherapy/immunotherapy. An extensive discussion was had which included a thorough discussion of potential side effect profile, risk versus benefits, and expected outcomes.  Risks, including but not limited to, possible hair loss, bone marrow damage, damage to body organs, allergic reactions, sterility, nausea/vomiting, constipation/diarrhea, sores in the mouth, secondary cancers, and rarely death were all discuss.  Specific side effects pertaining to their chemotherapy/immunotherapy medications were discussed as well.  The patient agrees with the plan and all questions and their support systems questions have been answered to their satisfaction.  Premedications were prescribed and patient was educated on appropriate usage.  Patient was educated on when to call, and given the number to call, or to notify the provider including but not limited to:  Persistent nausea and vomiting, dehydration, fever greater than 100.4 lasting over 1 hour induration or isolated feeders greater than 101, rash while on active chemotherapy or immunotherapy, severe pain or new onset pain  not controlled by current pain medication regimen.   ==10/04/2024: Tolerating chemotherapy well, pt has leg cramps has had all of his life worse at night, potassium mg normal, discussed medication, pt would like to try otc topicals such as biofreeze or icyhot with lidocaine first and if not helpful will notify clinic and will send our rx.  No other complaints, will continue chemotherapy, plan to see next week and if continues to tolerate well plan to see every other week.  ==10/11/2024: Magnesium wnl, but hypophosphatemia noted, pt denies diarrhea or antacid use.  Will start gentle supplementation with po multivitamin, discussed with patient his renal function and may need po phosphate if repeat labs show continued low phosphorus, but black box warning in CKD for po phosphate.  Will try multivitamin containing phos as well as increasing dietary intake - reviewed dietary sources of phosphorus.  Will also check vitamin D level, Vit D deficiency can cause/worsen hypophosphatemia and recheck phos level to determine if transient.  Pt is asymptomatic at this time has occasional leg cramps for all of his life, no increase or other symptoms.  Labs reviewed will continue chemotherapy   ==11/22/2024: Phos wnl, tolerating Cybor D well, leg cramps improved with baclofen, labs reviewed patient is cleared for chemotherapy. Will now also make referral to BMT team in [place].   ==12/27/2024: Has appt with Dr. Cardoso 1/16/2025, tolerating CyBorD well.  May use labs from BMT appt on 1/16/25 for chemo scheduled on 1/17/2024, will need cbc cmp. Denies complaints, will continue with CyBorD.  ==02/21/2025: Tolerating CyBorD well, next appt with Dr. Cardoso is on 2/27/2025 for transplant eval. Last myeloma labs were on 1/24/25 so I will have drawn today so they will be resulted for his visit next week. Whole body PET ct reviewed which showed bony degenerative changes without evidence of bony destruction, no hypermetabolic activity  seen and no interval changes from prior study.  Will continue CyBorD at this time.   ==03/07/2025: Pt recently had visit with Dr. Meredith with transplant who recommends patient to switch to Dina+VRD and start Xgeva. He has had dental clearance which was sent to [facility] will be scanned in chart.  Plan to send Dina+VRD as well as xgeva for approval and continue CyborD until approved. Will add hep panel and type and screen to next labs in anticipation of starting Dina  ==03/14/2025:  Patient here today to discuss upcoming chemotherapy/immunotherapy. An extensive discussion was had which included a thorough discussion of potential side effect profile, risk versus benefits, and expected outcomes.  Risks, including but not limited to, possible hair loss, bone marrow damage, damage to body organs, allergic reactions, sterility, nausea/vomiting, constipation/diarrhea, sores in the mouth, secondary cancers, and rarely death were all discuss.  Specific side effects pertaining to their chemotherapy/immunotherapy medications were discussed as well.  The patient agrees with the plan and all questions and their support systems questions have been answered to their satisfaction.  Premedications were prescribed and patient was educated on appropriate usage.  Patient was educated on when to call, and given the number to call, or to notify the provider including but not limited to:  Persistent nausea and vomiting, dehydration, fever greater than 100.4 lasting over 1 hour induration or isolated feeders greater than 101, rash while on active chemotherapy or immunotherapy, severe pain or new onset pain not controlled by current pain medication regimen. Starting Dina-VRD and xgeva today, pt doesn't have revlimid yet, will cont cytoxan until he receives it.  ==03/21/2025: Tolerated first cycle of VRD well, occasional headaches relived with tylenol and caffeine, labs reviewed, pt is cleared for treatment  ==04/04/2025: Sodium 127, pt having  dull headaches, encouraged increasing po sodium, reviewed dietary sources of sodium, will start on sodium tablets if does not increase with dietary salt.  Pt has history of chronic hearing loss for years, but has noticed worse over the past few months, will refer to ENT.  Labs reviewed, pt is cleared for treatment, week of Good Friday will see on Thurs due to holiday    2. Prophylaxis for infection prevention  == Continue Bactrim and acyclovir    3. Hypophosphatemia  ==Asymptomatic, normal magnesium level - pt has had leg cramps all of life, no increase previous phos levels wnl  ==Recheck to determine if transient, avoid antacids, check vitamin D level  ==encourage po intake, will start multivitamin containing phosphorus, may need higher dose if does not resolve, will prescribe with caution given renal function  == 11/22/24: Resolved    Plan:  stop  CyBorD - until start Dina VRD  Continue Dina-VRD  Cbc cmp weekly, myeloma labs q 4 weeks  NP appt in 2 weeks   Continue Bactrim and valtrex prophylaxis  Continue Xgeva - last admin on 3/14/2025        Total time spent in counseling and discussion about further management options including relevant lab work, treatment,  prognosis, medications and intended side effects was more than 40 minutes. More than 50 % of the time was spent in counseling and coordination of care.  This includes face to face time and non-face to face time preparing to see the patient (eg, review of tests), Obtaining and/or reviewing separately obtained history, Documenting clinical information in the electronic or other health record, Independently interpreting resultsand communicating results to the patient/family/caregiver, or Care coordination.

## 2025-04-07 DIAGNOSIS — C90.00 MULTIPLE MYELOMA NOT HAVING ACHIEVED REMISSION: Primary | ICD-10-CM

## 2025-04-07 DIAGNOSIS — C90.00 MULTIPLE MYELOMA NOT HAVING ACHIEVED REMISSION: ICD-10-CM

## 2025-04-07 DIAGNOSIS — Z76.82 STEM CELL TRANSPLANT CANDIDATE: Primary | ICD-10-CM

## 2025-04-07 DIAGNOSIS — Z76.82 STEM CELL TRANSPLANT CANDIDATE: ICD-10-CM

## 2025-04-12 ENCOUNTER — PATIENT MESSAGE (OUTPATIENT)
Dept: HEMATOLOGY/ONCOLOGY | Facility: CLINIC | Age: 55
End: 2025-04-12
Payer: COMMERCIAL

## 2025-04-17 ENCOUNTER — OFFICE VISIT (OUTPATIENT)
Dept: HEMATOLOGY/ONCOLOGY | Facility: CLINIC | Age: 55
End: 2025-04-17
Payer: COMMERCIAL

## 2025-04-17 VITALS
HEART RATE: 71 BPM | HEIGHT: 71 IN | WEIGHT: 202.81 LBS | RESPIRATION RATE: 16 BRPM | BODY MASS INDEX: 28.39 KG/M2 | OXYGEN SATURATION: 97 % | DIASTOLIC BLOOD PRESSURE: 76 MMHG | SYSTOLIC BLOOD PRESSURE: 125 MMHG

## 2025-04-17 DIAGNOSIS — C90.00 MULTIPLE MYELOMA NOT HAVING ACHIEVED REMISSION: Primary | ICD-10-CM

## 2025-04-17 DIAGNOSIS — N18.32 STAGE 3B CHRONIC KIDNEY DISEASE: ICD-10-CM

## 2025-04-17 DIAGNOSIS — D84.81 IMMUNODEFICIENCY DUE TO CONDITIONS CLASSIFIED ELSEWHERE: ICD-10-CM

## 2025-04-17 DIAGNOSIS — Z76.82 STEM CELL TRANSPLANT CANDIDATE: ICD-10-CM

## 2025-04-17 PROCEDURE — 3008F BODY MASS INDEX DOCD: CPT | Mod: CPTII,,, | Performed by: NURSE PRACTITIONER

## 2025-04-17 PROCEDURE — 3078F DIAST BP <80 MM HG: CPT | Mod: CPTII,,, | Performed by: NURSE PRACTITIONER

## 2025-04-17 PROCEDURE — 99215 OFFICE O/P EST HI 40 MIN: CPT | Mod: S$PBB,,, | Performed by: NURSE PRACTITIONER

## 2025-04-17 PROCEDURE — 3074F SYST BP LT 130 MM HG: CPT | Mod: CPTII,,, | Performed by: NURSE PRACTITIONER

## 2025-04-17 PROCEDURE — G2211 COMPLEX E/M VISIT ADD ON: HCPCS | Mod: S$PBB,,, | Performed by: NURSE PRACTITIONER

## 2025-04-17 PROCEDURE — 1159F MED LIST DOCD IN RCRD: CPT | Mod: CPTII,,, | Performed by: NURSE PRACTITIONER

## 2025-04-17 NOTE — PROGRESS NOTES
HEMATOLOGY FOLLOW UP CONSULTATION NOTE    Patient ID: Miky Carrasco is a 54 y.o. male.    Chief Complaint:  Multiple myeloma    HPI:  Patient is a 53-year-old male with past medical history of hyperlipidemia who recently was evaluated by Nephrology for worsening kidney function and underwent further lab work including serum protein electrophoresis which showed findings concerning for possible MGUS versus active multiple myeloma with decline in kidney.  Patient presents to our clinic today for further evaluation.  Voices no acute complaints. Denies bone pain, activity and appetite changes.              Past Medical History:   Diagnosis Date    Disorder of kidney and ureter     Multiple myeloma not having achieved remission        Family History   Problem Relation Name Age of Onset    Hypertension Mother      Diabetes Mother      Skin cancer Mother      Hypertension Father      Cancer Sister         Social History     Socioeconomic History    Marital status:    Tobacco Use    Smoking status: Never     Passive exposure: Current    Smokeless tobacco: Former     Types: Chew   Substance and Sexual Activity    Alcohol use: Not Currently    Drug use: Not Currently     Types: Marijuana    Sexual activity: Yes     Social Drivers of Health     Financial Resource Strain: Low Risk  (2/20/2025)    Overall Financial Resource Strain (CARDIA)     Difficulty of Paying Living Expenses: Not hard at all   Food Insecurity: No Food Insecurity (2/20/2025)    Hunger Vital Sign     Worried About Running Out of Food in the Last Year: Never true     Ran Out of Food in the Last Year: Never true   Transportation Needs: No Transportation Needs (2/20/2025)    PRAPARE - Transportation     Lack of Transportation (Medical): No     Lack of Transportation (Non-Medical): No   Physical Activity: Insufficiently Active (2/20/2025)    Exercise Vital Sign     Days of Exercise per Week: 1 day     Minutes of Exercise per Session: 30 min   Stress: No  Stress Concern Present (2/20/2025)    [employer] - Occupational Stress Questionnaire     Feeling of Stress : Only a little   Housing Stability: Low Risk  (2/20/2025)    Housing Stability Vital Sign     Unable to Pay for Housing in the Last Year: No     Number of Times Moved in the Last Year: 0     Homeless in the Last Year: No         History reviewed. No pertinent surgical history.      Review of systems:  Review of Systems   Constitutional:  Negative for activity change, appetite change, chills, diaphoresis, fatigue and unexpected weight change.   HENT:  Negative for congestion, facial swelling, hearing loss, mouth sores, trouble swallowing and voice change.    Eyes:  Negative for photophobia, pain, discharge and itching.   Respiratory:  Negative for apnea, cough, choking, chest tightness and shortness of breath.    Cardiovascular:  Negative for chest pain, palpitations and leg swelling.   Gastrointestinal:  Negative for abdominal distention, abdominal pain, anal bleeding and blood in stool.   Endocrine: Negative for cold intolerance, heat intolerance, polydipsia and polyphagia.   Genitourinary:  Negative for difficulty urinating, dysuria, flank pain and hematuria.   Musculoskeletal:  Negative for arthralgias, back pain, joint swelling, myalgias, neck pain and neck stiffness.   Skin:  Negative for color change, pallor and wound.   Allergic/Immunologic: Negative for environmental allergies, food allergies and immunocompromised state.   Neurological:  Negative for dizziness, seizures, facial asymmetry, speech difficulty, light-headedness, numbness and headaches.   Hematological:  Negative for adenopathy. Does not bruise/bleed easily.   Psychiatric/Behavioral:  Negative for agitation, behavioral problems, confusion, decreased concentration and sleep disturbance.                                Physical Exam  Vitals and nursing note reviewed.   Constitutional:       General: He is not in  acute distress.     Appearance: Normal appearance. He is not ill-appearing.   HENT:      Head: Normocephalic and atraumatic.      Nose: No congestion or rhinorrhea.   Eyes:      General: No scleral icterus.     Extraocular Movements: Extraocular movements intact.      Pupils: Pupils are equal, round, and reactive to light.   Cardiovascular:      Rate and Rhythm: Normal rate and regular rhythm.      Pulses: Normal pulses.      Heart sounds: Normal heart sounds. No murmur heard.     No gallop.   Pulmonary:      Effort: Pulmonary effort is normal. No respiratory distress.      Breath sounds: Normal breath sounds. No stridor. No wheezing or rhonchi.   Abdominal:      General: Bowel sounds are normal. There is no distension.      Palpations: There is no mass.      Tenderness: There is no abdominal tenderness. There is no guarding.   Musculoskeletal:         General: No swelling, tenderness, deformity or signs of injury. Normal range of motion.      Cervical back: Normal range of motion and neck supple. No rigidity. No muscular tenderness.      Right lower leg: No edema.      Left lower leg: No edema.   Skin:     General: Skin is warm.      Coloration: Skin is not jaundiced or pale.      Findings: No bruising or lesion.   Neurological:      General: No focal deficit present.      Mental Status: He is alert and oriented to person, place, and time.      Cranial Nerves: No cranial nerve deficit.      Sensory: No sensory deficit.      Motor: No weakness.      Gait: Gait normal.   Psychiatric:         Mood and Affect: Mood normal.         Behavior: Behavior normal.         Thought Content: Thought content normal.     Vitals:    04/17/25 0831   BP: 125/76   Pulse: 71   Resp: 16         Body surface area is 2.15 meters squared.    Assessment/Plan:      ECOG 1    Multiple myeloma:    == reviewed prior lab work done with Nephrology.  Patient is noted to have worsening renal failure and also has presence of monoclonal band on  immuno fixation studies.  I will initiate workup for multiple myeloma and will include a bone marrow biopsy as well as a PET scan to rule out any osteolytic lesions.  Discussed these findings in detail with patient giving him opportunity to ask questions  == 8/18/24:  Bone marrow biopsy done showed increased number of plasma cells.  No percentage mentioned but reported to be more than normal and in the smoldering range.  With evidence of worsening renal failure along with elevated kappa free light chains and kappa/lambda free light chain ratio findings are consistent with active multiple myeloma.  Due to decline in kidney function my initial plan would be to initiate CyBorD.  Patient reports that he is on a job which requires lot of traveling and unable to come to clinics  regularly do receive his treatment.   ==9/23/24:  Patient and wife here today to discuss upcoming chemotherapy/immunotherapy. An extensive discussion was had which included a thorough discussion of potential side effect profile, risk versus benefits, and expected outcomes.  Risks, including but not limited to, possible hair loss, bone marrow damage, damage to body organs, allergic reactions, sterility, nausea/vomiting, constipation/diarrhea, sores in the mouth, secondary cancers, and rarely death were all discuss.  Specific side effects pertaining to their chemotherapy/immunotherapy medications were discussed as well.  The patient agrees with the plan and all questions and their support systems questions have been answered to their satisfaction.  Premedications were prescribed and patient was educated on appropriate usage.  Patient was educated on when to call, and given the number to call, or to notify the provider including but not limited to:  Persistent nausea and vomiting, dehydration, fever greater than 100.4 lasting over 1 hour induration or isolated feeders greater than 101, rash while on active chemotherapy or immunotherapy, severe pain  or new onset pain not controlled by current pain medication regimen.   ==10/04/2024: Tolerating chemotherapy well, pt has leg cramps has had all of his life worse at night, potassium mg normal, discussed medication, pt would like to try otc topicals such as biofreeze or icyhot with lidocaine first and if not helpful will notify clinic and will send our rx.  No other complaints, will continue chemotherapy, plan to see next week and if continues to tolerate well plan to see every other week.  ==10/11/2024: Magnesium wnl, but hypophosphatemia noted, pt denies diarrhea or antacid use.  Will start gentle supplementation with po multivitamin, discussed with patient his renal function and may need po phosphate if repeat labs show continued low phosphorus, but black box warning in CKD for po phosphate.  Will try multivitamin containing phos as well as increasing dietary intake - reviewed dietary sources of phosphorus.  Will also check vitamin D level, Vit D deficiency can cause/worsen hypophosphatemia and recheck phos level to determine if transient.  Pt is asymptomatic at this time has occasional leg cramps for all of his life, no increase or other symptoms.  Labs reviewed will continue chemotherapy   ==11/22/2024: Phos wnl, tolerating Cybor D well, leg cramps improved with baclofen, labs reviewed patient is cleared for chemotherapy. Will now also make referral to BMT team in [place].   ==12/27/2024: Has appt with Dr. Cardoso 1/16/2025, tolerating CyBorD well.  May use labs from BMT appt on 1/16/25 for chemo scheduled on 1/17/2024, will need cbc cmp. Denies complaints, will continue with CyBorD.  ==02/21/2025: Tolerating CyBorD well, next appt with Dr. Cardoso is on 2/27/2025 for transplant eval. Last myeloma labs were on 1/24/25 so I will have drawn today so they will be resulted for his visit next week. Whole body PET ct reviewed which showed bony degenerative changes without evidence of bony destruction, no  hypermetabolic activity seen and no interval changes from prior study.  Will continue CyBorD at this time.   ==03/07/2025: Pt recently had visit with Dr. Meredith with transplant who recommends patient to switch to Dina+VRD and start Xgeva. He has had dental clearance which was sent to [facility] will be scanned in chart.  Plan to send Dina+VRD as well as xgeva for approval and continue CyborD until approved. Will add hep panel and type and screen to next labs in anticipation of starting Dina  ==03/14/2025:  Patient here today to discuss upcoming chemotherapy/immunotherapy. An extensive discussion was had which included a thorough discussion of potential side effect profile, risk versus benefits, and expected outcomes.  Risks, including but not limited to, possible hair loss, bone marrow damage, damage to body organs, allergic reactions, sterility, nausea/vomiting, constipation/diarrhea, sores in the mouth, secondary cancers, and rarely death were all discuss.  Specific side effects pertaining to their chemotherapy/immunotherapy medications were discussed as well.  The patient agrees with the plan and all questions and their support systems questions have been answered to their satisfaction.  Premedications were prescribed and patient was educated on appropriate usage.  Patient was educated on when to call, and given the number to call, or to notify the provider including but not limited to:  Persistent nausea and vomiting, dehydration, fever greater than 100.4 lasting over 1 hour induration or isolated feeders greater than 101, rash while on active chemotherapy or immunotherapy, severe pain or new onset pain not controlled by current pain medication regimen. Starting Dina-VRD and xgeva today, pt doesn't have revlimid yet, will cont cytoxan until he receives it.  ==03/21/2025: Tolerated first cycle of VRD well, occasional headaches relived with tylenol and caffeine, labs reviewed, pt is cleared for  treatment  ==04/04/2025: Sodium 127, pt having dull headaches, encouraged increasing po sodium, reviewed dietary sources of sodium, will start on sodium tablets if does not increase with dietary salt.  Pt has history of chronic hearing loss for years, but has noticed worse over the past few months, will refer to ENT.  Labs reviewed, pt is cleared for treatment, week of Good Friday will see on Thurs due to holiday  ==04/17/2025: Seeing ENT next week for fluid behind ear drum per pcp and tinnitus, stem cell transplant delayed by bmt to August, will continue dina-vrd. Last xgeva was 4/11 will continue    2. Prophylaxis for infection prevention  == Continue Bactrim and acyclovir    3. Hypophosphatemia  ==Asymptomatic, normal magnesium level - pt has had leg cramps all of life, no increase previous phos levels wnl  ==Recheck to determine if transient, avoid antacids, check vitamin D level  ==encourage po intake, will start multivitamin containing phosphorus, may need higher dose if does not resolve, will prescribe with caution given renal function  == 11/22/24: Resolved      Plan:  stop  CyBorD - until start Dina VRD  Continue Dina-VRD  Cbc cmp weekly, myeloma labs q 4 weeks  NP appt in 2 weeks   Continue Bactrim and valtrex prophylaxis  Continue Xgeva - last admin on 4/11/2025        Total time spent in counseling and discussion about further management options including relevant lab work, treatment,  prognosis, medications and intended side effects was more than 40 minutes. More than 50 % of the time was spent in counseling and coordination of care.  This includes face to face time and non-face to face time preparing to see the patient (eg, review of tests), Obtaining and/or reviewing separately obtained history, Documenting clinical information in the electronic or other health record, Independently interpreting resultsand communicating results to the patient/family/caregiver, or Care coordination.

## 2025-04-22 ENCOUNTER — PATIENT MESSAGE (OUTPATIENT)
Dept: HEMATOLOGY/ONCOLOGY | Facility: CLINIC | Age: 55
End: 2025-04-22
Payer: COMMERCIAL

## 2025-04-22 DIAGNOSIS — C90.00 MULTIPLE MYELOMA NOT HAVING ACHIEVED REMISSION: Primary | ICD-10-CM

## 2025-04-25 RX ORDER — BORTEZOMIB 3.5 MG/1
1.3 INJECTION, POWDER, LYOPHILIZED, FOR SOLUTION INTRAVENOUS; SUBCUTANEOUS
OUTPATIENT
Start: 2025-05-02

## 2025-04-25 RX ORDER — HEPARIN 100 UNIT/ML
500 SYRINGE INTRAVENOUS
OUTPATIENT
Start: 2025-05-02

## 2025-04-25 RX ORDER — DIPHENHYDRAMINE HYDROCHLORIDE 50 MG/ML
50 INJECTION, SOLUTION INTRAMUSCULAR; INTRAVENOUS ONCE AS NEEDED
OUTPATIENT
Start: 2025-05-09

## 2025-04-25 RX ORDER — HEPARIN 100 UNIT/ML
500 SYRINGE INTRAVENOUS
OUTPATIENT
Start: 2025-05-16

## 2025-04-25 RX ORDER — EPINEPHRINE 0.3 MG/.3ML
0.3 INJECTION SUBCUTANEOUS ONCE AS NEEDED
OUTPATIENT
Start: 2025-05-16

## 2025-04-25 RX ORDER — EPINEPHRINE 0.3 MG/.3ML
0.3 INJECTION SUBCUTANEOUS ONCE AS NEEDED
OUTPATIENT
Start: 2025-05-02

## 2025-04-25 RX ORDER — BORTEZOMIB 3.5 MG/1
1.3 INJECTION, POWDER, LYOPHILIZED, FOR SOLUTION INTRAVENOUS; SUBCUTANEOUS
OUTPATIENT
Start: 2025-04-25

## 2025-04-25 RX ORDER — DIPHENHYDRAMINE HYDROCHLORIDE 50 MG/ML
50 INJECTION, SOLUTION INTRAMUSCULAR; INTRAVENOUS ONCE AS NEEDED
OUTPATIENT
Start: 2025-05-16

## 2025-04-25 RX ORDER — DIPHENHYDRAMINE HCL 25 MG
25 CAPSULE ORAL
OUTPATIENT
Start: 2025-05-02

## 2025-04-25 RX ORDER — DIPHENHYDRAMINE HCL 25 MG
25 CAPSULE ORAL
OUTPATIENT
Start: 2025-05-09

## 2025-04-25 RX ORDER — SODIUM CHLORIDE 0.9 % (FLUSH) 0.9 %
10 SYRINGE (ML) INJECTION
OUTPATIENT
Start: 2025-05-16

## 2025-04-25 RX ORDER — BORTEZOMIB 3.5 MG/1
1.3 INJECTION, POWDER, LYOPHILIZED, FOR SOLUTION INTRAVENOUS; SUBCUTANEOUS
OUTPATIENT
Start: 2025-05-09

## 2025-04-25 RX ORDER — ACETAMINOPHEN 325 MG/1
650 TABLET ORAL
OUTPATIENT
Start: 2025-05-16

## 2025-04-25 RX ORDER — SODIUM CHLORIDE 0.9 % (FLUSH) 0.9 %
10 SYRINGE (ML) INJECTION
OUTPATIENT
Start: 2025-05-02

## 2025-04-25 RX ORDER — DIPHENHYDRAMINE HCL 25 MG
25 CAPSULE ORAL
OUTPATIENT
Start: 2025-05-16

## 2025-04-25 RX ORDER — BORTEZOMIB 3.5 MG/1
1.3 INJECTION, POWDER, LYOPHILIZED, FOR SOLUTION INTRAVENOUS; SUBCUTANEOUS
OUTPATIENT
Start: 2025-05-16

## 2025-04-25 RX ORDER — SODIUM CHLORIDE 0.9 % (FLUSH) 0.9 %
10 SYRINGE (ML) INJECTION
OUTPATIENT
Start: 2025-05-09

## 2025-04-25 RX ORDER — SODIUM CHLORIDE 0.9 % (FLUSH) 0.9 %
10 SYRINGE (ML) INJECTION
OUTPATIENT
Start: 2025-04-25

## 2025-04-25 RX ORDER — DIPHENHYDRAMINE HCL 25 MG
25 CAPSULE ORAL
OUTPATIENT
Start: 2025-04-25

## 2025-04-25 RX ORDER — ACETAMINOPHEN 325 MG/1
650 TABLET ORAL
OUTPATIENT
Start: 2025-05-09

## 2025-04-25 RX ORDER — PROCHLORPERAZINE EDISYLATE 5 MG/ML
10 INJECTION INTRAMUSCULAR; INTRAVENOUS ONCE AS NEEDED
OUTPATIENT
Start: 2025-05-09

## 2025-04-25 RX ORDER — ACETAMINOPHEN 325 MG/1
650 TABLET ORAL
OUTPATIENT
Start: 2025-04-25

## 2025-04-25 RX ORDER — PROCHLORPERAZINE EDISYLATE 5 MG/ML
10 INJECTION INTRAMUSCULAR; INTRAVENOUS ONCE AS NEEDED
OUTPATIENT
Start: 2025-05-02

## 2025-04-25 RX ORDER — ACETAMINOPHEN 325 MG/1
650 TABLET ORAL
OUTPATIENT
Start: 2025-05-02

## 2025-04-25 RX ORDER — HEPARIN 100 UNIT/ML
500 SYRINGE INTRAVENOUS
OUTPATIENT
Start: 2025-05-09

## 2025-04-25 RX ORDER — DIPHENHYDRAMINE HYDROCHLORIDE 50 MG/ML
50 INJECTION, SOLUTION INTRAMUSCULAR; INTRAVENOUS ONCE AS NEEDED
OUTPATIENT
Start: 2025-04-25

## 2025-04-25 RX ORDER — EPINEPHRINE 0.3 MG/.3ML
0.3 INJECTION SUBCUTANEOUS ONCE AS NEEDED
OUTPATIENT
Start: 2025-05-09

## 2025-04-25 RX ORDER — PROCHLORPERAZINE EDISYLATE 5 MG/ML
10 INJECTION INTRAMUSCULAR; INTRAVENOUS ONCE AS NEEDED
OUTPATIENT
Start: 2025-05-16

## 2025-04-25 RX ORDER — HEPARIN 100 UNIT/ML
500 SYRINGE INTRAVENOUS
OUTPATIENT
Start: 2025-04-25

## 2025-04-25 RX ORDER — PROCHLORPERAZINE EDISYLATE 5 MG/ML
10 INJECTION INTRAMUSCULAR; INTRAVENOUS ONCE AS NEEDED
OUTPATIENT
Start: 2025-04-25

## 2025-04-25 RX ORDER — DIPHENHYDRAMINE HYDROCHLORIDE 50 MG/ML
50 INJECTION, SOLUTION INTRAMUSCULAR; INTRAVENOUS ONCE AS NEEDED
OUTPATIENT
Start: 2025-05-02

## 2025-04-25 RX ORDER — EPINEPHRINE 0.3 MG/.3ML
0.3 INJECTION SUBCUTANEOUS ONCE AS NEEDED
OUTPATIENT
Start: 2025-04-25

## 2025-04-28 ENCOUNTER — PATIENT MESSAGE (OUTPATIENT)
Dept: HEMATOLOGY/ONCOLOGY | Facility: CLINIC | Age: 55
End: 2025-04-28

## 2025-04-28 RX ORDER — LENALIDOMIDE 2.5 MG/1
CAPSULE ORAL
COMMUNITY

## 2025-04-29 ENCOUNTER — OFFICE VISIT (OUTPATIENT)
Dept: HEMATOLOGY/ONCOLOGY | Facility: CLINIC | Age: 55
End: 2025-04-29
Payer: COMMERCIAL

## 2025-04-29 DIAGNOSIS — Z76.82 STEM CELL TRANSPLANT CANDIDATE: ICD-10-CM

## 2025-04-29 DIAGNOSIS — D84.81 IMMUNODEFICIENCY DUE TO CONDITIONS CLASSIFIED ELSEWHERE: ICD-10-CM

## 2025-04-29 DIAGNOSIS — C90.00 MULTIPLE MYELOMA NOT HAVING ACHIEVED REMISSION: Primary | ICD-10-CM

## 2025-04-29 RX ORDER — CYCLOBENZAPRINE HCL 10 MG
TABLET ORAL
COMMUNITY
Start: 2025-02-28

## 2025-04-29 RX ORDER — DIPHENHYDRAMINE HCL 25 MG
25 CAPSULE ORAL
COMMUNITY
Start: 2025-04-17

## 2025-04-29 RX ORDER — MONTELUKAST SODIUM 10 MG/1
10 TABLET ORAL
COMMUNITY
Start: 2025-04-28

## 2025-04-29 RX ORDER — NEOMYCIN SULFATE, POLYMYXIN B SULFATE AND HYDROCORTISONE 10; 3.5; 1 MG/ML; MG/ML; [USP'U]/ML
SUSPENSION/ DROPS AURICULAR (OTIC)
COMMUNITY
Start: 2025-03-26

## 2025-04-29 RX ORDER — LENALIDOMIDE 25 MG/1
CAPSULE ORAL
Qty: 21 CAPSULE | Refills: 3 | Status: SHIPPED | OUTPATIENT
Start: 2025-04-29 | End: 2025-05-20

## 2025-04-29 RX ORDER — METHOCARBAMOL 750 MG/1
TABLET, FILM COATED ORAL
COMMUNITY
Start: 2025-04-28

## 2025-04-29 NOTE — PROGRESS NOTES
Section of Hematology and Stem Cell Transplantation  Follow Up     The patient location is: [place], [place]  The chief complaint leading to consultation is: Multiple myeloma   Visit type: audiovisual  Face to Face time with patient: 20 minutes  35 minutes of total time spent on the encounter, which includes face to face time and non-face to face time preparing to see the patient (eg, review of tests), Obtaining and/or reviewing separately obtained history, Documenting clinical information in the electronic or other health record, Independently interpreting results (not separately reported) and communicating results to the patient/family/caregiver, or Care coordination (not separately reported).   Each patient to whom he or she provides medical services by telemedicine is:  (1) informed of the relationship between the physician and patient and the respective role of any other health care provider with respect to management of the patient; and (2) notified that he or she may decline to receive medical services by telemedicine and may withdraw from such care at any time.    Notes:     Date of visit: 4/29/25  Visit diagnosis: No primary diagnosis found.    Oncologic History:     Primary Oncologic Diagnosis: [place] free light chain restricted multiple myeloma    7/2024: First labs in [facility] system showing renal dysfunction with creatinine 1.9. He was referred to Dr. Saunders for further evaluation.   7/2024: Baseline myeloma labs - K , L FLC 8.10, FLC ratio 86; IgG 881, IgA 61, IgM 24; ; B2M 3.6; Cr 1.95, Ca 9.2, Hgb 17.6.  8/2/24: Bone marrow biopsy showed increased number of plasma cells (20-25%). [place] red negative. Diploid karyotype. FISH not available.   9/27/24: Cycle 1 day 1 of CyBorD.  12/20/24: Bone marrow biopsy revealed persistent plasma cell neoplasm (30%). FISH normal.   12/27/24: Cycle 4 day 1 of CyBorD.  3/14/2025: Cycle 1 day 1 Dina-VRD    History of Present Ilness:   Miky Cheng)  is a pleasant 54 y.o.male with multiple myeloma who presents for follow up. He is doing well overall. He has started cycle 3 of Dina-VRD and is tolerating it well. He is currently seeing ENT for TMJ issues but besides that has no other complaints.    PAST MEDICAL HISTORY:   Past Medical History:   Diagnosis Date    Disorder of kidney and ureter     Multiple myeloma not having achieved remission        PAST SURGICAL HISTORY:   No past surgical history on file.    PAST SOCIAL HISTORY:  Social History     Tobacco Use    Smoking status: Never     Passive exposure: Current    Smokeless tobacco: Former     Types: Chew   Substance Use Topics    Alcohol use: Not Currently    Drug use: Not Currently     Types: Marijuana       FAMILY HISTORY:  Family History   Problem Relation Name Age of Onset    Hypertension Mother      Diabetes Mother      Skin cancer Mother      Hypertension Father      Cancer Sister         CURRENT MEDICATIONS:   Current Outpatient Medications   Medication Sig    acyclovir (ZOVIRAX) 400 MG tablet Take 1 tablet (400 mg total) by mouth once daily.    acyclovir (ZOVIRAX) 400 MG tablet Take 1 tablet (400 mg total) by mouth 2 (two) times daily.    aspirin 81 MG Chew Take 1 tablet (81 mg total) by mouth once daily.    cetirizine (ZYRTEC) 10 MG tablet Take 1 tablet (10 mg total) by mouth once daily.    dexAMETHasone (DECADRON) 4 MG Tab Take 10 tablets (40 mg total) by mouth every 7 days. on days 1, 8, 15, and 22 of each chemotherapy cycle. Take with food.    dexAMETHasone (DECADRON) 4 MG Tab Take 10 tablets (40 mg total) by mouth every 7 days. Take with food.    EScitalopram oxalate (LEXAPRO) 10 MG tablet Take 1 tablet (10 mg total) by mouth once daily.    fluticasone propionate (FLONASE) 50 mcg/actuation nasal spray 1 spray (50 mcg total) by Each Nostril route once daily.    lenalidomide (REVLIMID) 25 mg Cap TAKE 1 CAPSULE ONCE DAILY FOR 21 DAYS ON, THEN 7 DAYS OFF    lenalidomide 2.5 mg  Cap     ondansetron (ZOFRAN) 8 MG tablet Take 1 tablet (8 mg total) by mouth every 8 (eight) hours as needed.    prochlorperazine (COMPAZINE) 5 MG tablet Take 2 tablets (10 mg total) by mouth every 6 (six) hours as needed for Nausea.    sulfamethoxazole-trimethoprim 400-80mg (BACTRIM) 400-80 mg per tablet Take 1 tablet by mouth once daily. for 360 doses     No current facility-administered medications for this visit.       ALLERGIES:   Review of patient's allergies indicates:   Allergen Reactions    Penicillins Rash    Baclofen Other (See Comments)     Can't sit still, causes body to twitch       Review of Systems:     Pertinent positives and negatives included in the HPI. Otherwise a complete review of systems is negative.    Physical Exam:     There were no vitals filed for this visit.      ECOG Performance Status: (foot note - ECOG PS provided by Eastern Cooperative Oncology Group) 0 - Asymptomatic    Karnofsky Performance Score:  100%- Normal, No Complaints, No Evidence of Disease    Labs:   Lab Results   Component Value Date    WBC 4.50 04/24/2025    RBC 4.69 01/16/2025    HGB 13.5 (L) 04/24/2025    HCT 39.9 (L) 04/24/2025    MCV 96.6 (H) 04/24/2025    MCH 31.8 (H) 01/16/2025    MCHC 34.0 01/16/2025    RDW 14.4 01/16/2025     04/24/2025    MPV 11.3 01/16/2025    GRAN 4.6 01/16/2025    GRAN 65.0 01/16/2025    LYMPH 1.5 01/16/2025    LYMPH 20.9 01/16/2025    MONO 0.7 01/16/2025    MONO 9.8 01/16/2025    EOS 0.1 01/16/2025    BASO 0.04 01/16/2025    EOSINOPHIL 1.7 01/16/2025    BASOPHIL 0.6 01/16/2025       CMP  Sodium   Date Value Ref Range Status   04/24/2025 133 (L) 136 - 145 mmol/L Final     Potassium   Date Value Ref Range Status   04/24/2025 3.8 3.5 - 5.1 mmol/L Final     Chloride   Date Value Ref Range Status   04/24/2025 98 98 - 107 mmol/L Final     CO2   Date Value Ref Range Status   04/24/2025 22 22 - 29 mmol/L Final     Glucose   Date Value Ref Range Status   02/27/2025 191 (H) 70 - 110  mg/dL Final     BUN   Date Value Ref Range Status   04/24/2025 21.5 (H) 6 - 20 mg/dL Final     Creatinine   Date Value Ref Range Status   04/24/2025 1.95 (H) 0.70 - 1.20 mg/dL Final     Comment:     Recommend repeat creatinine within 90 days.     Calcium   Date Value Ref Range Status   04/24/2025 9.1 8.6 - 10.2 mg/dL Final     Total Protein   Date Value Ref Range Status   02/27/2025 6.3 (L) 6.4 - 8.2 g/dL Final     Albumin   Date Value Ref Range Status   04/24/2025 4.2 3.5 - 5.2 g/dL Final     Total Bilirubin   Date Value Ref Range Status   02/27/2025 0.4 0.0 - 1.0 mg/dL Final     Alkaline Phosphatase   Date Value Ref Range Status   02/27/2025 101 46 - 116 U/L Final     AST   Date Value Ref Range Status   04/24/2025 14 0 - 40 U/L Final     ALT   Date Value Ref Range Status   02/27/2025 41 12 - 78 U/L Final     Comment:     specimen grossly lipemic     Anion Gap   Date Value Ref Range Status   04/24/2025 13.0 8.0 - 17.0 mmol/L Final     Comment:     NOTE  Testing performed at:  [facility], [address] [place] [address] CLIA #:[id number]           Pathology:  Reviewed     Assessment and Plan:   Miky Carrasco (Miky) is a pleasant 54 y.o.male with multiple myeloma here to discuss transplant.     Autologous stem cell transplant candidate:  We discussed the role for autologous stem cell transplantation in multiple myeloma as a means to improve progression free survival (not curative) and provide prolonged disease control. We had an extensive discussion about the rationale, logistics, risks, and benefits. We reviewed the requirement to stay in the [place] area for 30 days with a caregiver at all times. We discussed the risks, including infection, organ toxicity, relapse of disease, and secondary cancers. We reviewed the need for maintenance therapy after day 100. Ideally we will move forward with transplant ASAP depending on response (VGPR or CR) - at this time his FLC ratio remains elevated  and bone marrow biopsy with persistent myeloma. He tsrated Dina-VRD for more disease control on 3/14/25.  Coordinator: Kayla Ewing  Conditioning Regimen: Melphalan 200 mg/m2  Cell Dose: 5-10 x 10^6 CD34/kg    Kappa FLC restricted multiple myeloma, R-ISS stage II:  Patient was referred to hematology oncology in 7/2024 due to worsening renal function. He was worked up for multple myeloma and his bone marrow biopsy on 8/2/24 showed increased number of plasma cells (20-25%). His marrow and the evidence of worsening renal failure, along with elevated kappa free light chains and kappa/lambda free light chain ratio findings were consistent with active multiple myeloma. He is currently on cycle 6 of CyBorD and he had stable disease/partial response after 6 cycles of CyBorD.  - Patient now on cycle 3 of Dina-VRD. He will get updated myeloma labs tomorrow to see how he is responding to treatment. Will recheck myeloma labs following cycle 4 of treatment. Plan to move forward with transplant in August 2025.     Immunodeficiency   Patient is currently on bactrim and acyclovir    Orders/Follow Up:           Route Chart for Scheduling    BMT Chart Routing      Follow up with physician 6 weeks. With [facility] virtual   Follow up with PATRICIA    Provider visit type    Infusion scheduling note    Injection scheduling note    Labs    Imaging    Pharmacy appointment    Other referrals              Treatment Plan Information   OP Myeloma DINA-VRD daratumumab bortezomib (WEEKLY) lenalidomide dexAMETHasone Q4W Sara De León NP   Associated diagnosis: Multiple myeloma not having achieved remission  Beta-2-microglobulin (mg/L): 3.6, Albumin (g/dL): 4.1, ISS: Stage II, High-risk cytogenetics: Absent, LDH: Normal noted on 8/15/2024   Line of treatment: Second Line  Treatment Goal: Curative     Upcoming Treatment Dates - OP Myeloma DINA-VRD daratumumab bortezomib (WEEKLY) lenalidomide dexAMETHasone Q4W    5/2/2025       Pre-Medications        acetaminophen tablet 650 mg       diphenhydrAMINE capsule 25 mg       Chemotherapy       bortezomib (VELCADE) injection 2.75 mg  5/9/2025       Pre-Medications       acetaminophen tablet 650 mg       diphenhydrAMINE capsule 25 mg       Chemotherapy       bortezomib (VELCADE) injection 2.75 mg       daratumumab-hyaluronidase-fihj (DARZALEX FASPRO) subcutaneous injection 1,800 mg 15 mL  5/16/2025       Pre-Medications       acetaminophen tablet 650 mg       diphenhydrAMINE capsule 25 mg       Chemotherapy       bortezomib (VELCADE) injection 2.75 mg  5/23/2025       Pre-Medications       acetaminophen tablet 650 mg       diphenhydrAMINE capsule 25 mg       Chemotherapy       bortezomib (VELCADE) injection 2.75 mg       daratumumab-hyaluronidase-fihj (DARZALEX FASPRO) subcutaneous injection 1,800 mg 15 mL    Therapy Plan Information  DENOSUMAB (XGEVA) Q4W for Multiple myeloma not having achieved remission, noted on 8/15/2024  Medications  denosumab (XGEVA) solution 120 mg  120 mg, Subcutaneous, Every 4 weeks      No therapy plan of the specified type found.    No therapy plan of the specified type found.        Total time of this visit was 40 minutes, including time spent face to face with patient and/or via video/audio, and also in preparing for today's visit for MDM and documentation. (Medical Decision Making, including consideration of possible diagnoses, management options, complex medical record review, review of diagnostic tests and information, consideration and discussion of significant complications based on comorbidities, and discussion with providers involved with the care of the patient). Greater than 50% was spent face to face with the patient counseling and coordinating care.      Shoshana Schaeffer PA-C  Malignant Hematology, Stem Cell Transplant, and Cellular Therapy  [facility]  [facility]

## 2025-04-29 NOTE — Clinical Note
Hey there team, I just saw Mr. Miky Carrasco and am glad to hear he is doing well. He states he is suppose to start revlimid again tomorrow but he has not received it from the pharmacy. Other then that, he will get updated myeloma labs tomorrow for us to determine disease response at this point. After cycle 4 could yall please check CBC, CMP, SPEP, JANIS, Immunoglobulins, and free light chains. Thanks!

## 2025-05-07 DIAGNOSIS — Z76.82 STEM CELL TRANSPLANT CANDIDATE: Primary | ICD-10-CM

## 2025-05-07 DIAGNOSIS — C90.00 MULTIPLE MYELOMA NOT HAVING ACHIEVED REMISSION: ICD-10-CM

## 2025-05-09 ENCOUNTER — OFFICE VISIT (OUTPATIENT)
Dept: HEMATOLOGY/ONCOLOGY | Facility: CLINIC | Age: 55
End: 2025-05-09
Payer: COMMERCIAL

## 2025-05-09 VITALS
DIASTOLIC BLOOD PRESSURE: 70 MMHG | WEIGHT: 201 LBS | OXYGEN SATURATION: 98 % | BODY MASS INDEX: 28.14 KG/M2 | HEIGHT: 71 IN | SYSTOLIC BLOOD PRESSURE: 118 MMHG | HEART RATE: 70 BPM | RESPIRATION RATE: 18 BRPM

## 2025-05-09 DIAGNOSIS — N18.32 STAGE 3B CHRONIC KIDNEY DISEASE: ICD-10-CM

## 2025-05-09 DIAGNOSIS — D84.81 IMMUNODEFICIENCY DUE TO CONDITIONS CLASSIFIED ELSEWHERE: ICD-10-CM

## 2025-05-09 DIAGNOSIS — Z76.82 STEM CELL TRANSPLANT CANDIDATE: ICD-10-CM

## 2025-05-09 DIAGNOSIS — C90.00 MULTIPLE MYELOMA NOT HAVING ACHIEVED REMISSION: Primary | ICD-10-CM

## 2025-05-09 NOTE — PROGRESS NOTES
HEMATOLOGY FOLLOW UP CONSULTATION NOTE    Patient ID: Miky Carrasco is a 54 y.o. male.    Chief Complaint:  Multiple myeloma    HPI:  Patient is a 53-year-old male with past medical history of hyperlipidemia who recently was evaluated by Nephrology for worsening kidney function and underwent further lab work including serum protein electrophoresis which showed findings concerning for possible MGUS versus active multiple myeloma with decline in kidney.  Patient presents to our clinic today for further evaluation.  Voices no acute complaints. Denies bone pain, activity and appetite changes.              Past Medical History:   Diagnosis Date    Disorder of kidney and ureter     Multiple myeloma not having achieved remission        Family History   Problem Relation Name Age of Onset    Hypertension Mother      Diabetes Mother      Skin cancer Mother      Hypertension Father      Cancer Sister         Social History     Socioeconomic History    Marital status:    Tobacco Use    Smoking status: Never     Passive exposure: Current    Smokeless tobacco: Former     Types: Chew   Substance and Sexual Activity    Alcohol use: Not Currently    Drug use: Not Currently     Types: Marijuana    Sexual activity: Yes     Social Drivers of Health     Financial Resource Strain: Low Risk  (4/17/2025)    Received from [facility]    Overall Financial Resource Strain (CARDIA)     Difficulty of Paying Living Expenses: Not hard at all   Food Insecurity: No Food Insecurity (4/17/2025)    Received from [facility]    Hunger Vital Sign     Worried About Running Out of Food in the Last Year: Never true     Ran Out of Food in the Last Year: Never true   Transportation Needs: No Transportation Needs (4/17/2025)    Received from [facility]    PRAPARE - Transportation     Lack of Transportation (Medical): No     Lack of Transportation (Non-Medical): No   Physical  Activity: Insufficiently Active (2/20/2025)    Exercise Vital Sign     Days of Exercise per Week: 1 day     Minutes of Exercise per Session: 30 min   Stress: No Stress Concern Present (4/17/2025)    Received from [facility]    [employer] - Occupational Stress Questionnaire     Feeling of Stress : Not at all   Housing Stability: Unknown (4/17/2025)    Received from [facility]    Housing Stability Vital Sign     Unable to Pay for Housing in the Last Year: No     Homeless in the Last Year: No         History reviewed. No pertinent surgical history.      Review of systems:  Review of Systems   Constitutional:  Negative for activity change, appetite change, chills, diaphoresis, fatigue and unexpected weight change.   HENT:  Negative for congestion, facial swelling, hearing loss, mouth sores, trouble swallowing and voice change.    Eyes:  Negative for photophobia, pain, discharge and itching.   Respiratory:  Negative for apnea, cough, choking, chest tightness and shortness of breath.    Cardiovascular:  Negative for chest pain, palpitations and leg swelling.   Gastrointestinal:  Negative for abdominal distention, abdominal pain, anal bleeding and blood in stool.   Endocrine: Negative for cold intolerance, heat intolerance, polydipsia and polyphagia.   Genitourinary:  Negative for difficulty urinating, dysuria, flank pain and hematuria.   Musculoskeletal:  Negative for arthralgias, back pain, joint swelling, myalgias, neck pain and neck stiffness.   Skin:  Negative for color change, pallor and wound.   Allergic/Immunologic: Negative for environmental allergies, food allergies and immunocompromised state.   Neurological:  Negative for dizziness, seizures, facial asymmetry, speech difficulty, light-headedness, numbness and headaches.   Hematological:  Negative for adenopathy. Does not bruise/bleed easily.   Psychiatric/Behavioral:  Negative for  agitation, behavioral problems, confusion, decreased concentration and sleep disturbance.                                Physical Exam  Vitals and nursing note reviewed.   Constitutional:       General: He is not in acute distress.     Appearance: Normal appearance. He is not ill-appearing.   HENT:      Head: Normocephalic and atraumatic.      Nose: No congestion or rhinorrhea.   Eyes:      General: No scleral icterus.     Extraocular Movements: Extraocular movements intact.      Pupils: Pupils are equal, round, and reactive to light.   Cardiovascular:      Rate and Rhythm: Normal rate and regular rhythm.      Pulses: Normal pulses.      Heart sounds: Normal heart sounds. No murmur heard.     No gallop.   Pulmonary:      Effort: Pulmonary effort is normal. No respiratory distress.      Breath sounds: Normal breath sounds. No stridor. No wheezing or rhonchi.   Abdominal:      General: Bowel sounds are normal. There is no distension.      Palpations: There is no mass.      Tenderness: There is no abdominal tenderness. There is no guarding.   Musculoskeletal:         General: No swelling, tenderness, deformity or signs of injury. Normal range of motion.      Cervical back: Normal range of motion and neck supple. No rigidity. No muscular tenderness.      Right lower leg: No edema.      Left lower leg: No edema.   Skin:     General: Skin is warm.      Coloration: Skin is not jaundiced or pale.      Findings: No bruising or lesion.   Neurological:      General: No focal deficit present.      Mental Status: He is alert and oriented to person, place, and time.      Cranial Nerves: No cranial nerve deficit.      Sensory: No sensory deficit.      Motor: No weakness.      Gait: Gait normal.   Psychiatric:         Mood and Affect: Mood normal.         Behavior: Behavior normal.         Thought Content: Thought content normal.     Vitals:    05/09/25 0814   BP: 118/70   Pulse: 70   Resp: 18         Body surface area is 2.14  meters squared.    Assessment/Plan:      ECOG 1    Multiple myeloma:    == reviewed prior lab work done with Nephrology.  Patient is noted to have worsening renal failure and also has presence of monoclonal band on immuno fixation studies.  I will initiate workup for multiple myeloma and will include a bone marrow biopsy as well as a PET scan to rule out any osteolytic lesions.  Discussed these findings in detail with patient giving him opportunity to ask questions  == 8/18/24:  Bone marrow biopsy done showed increased number of plasma cells.  No percentage mentioned but reported to be more than normal and in the smoldering range.  With evidence of worsening renal failure along with elevated kappa free light chains and kappa/lambda free light chain ratio findings are consistent with active multiple myeloma.  Due to decline in kidney function my initial plan would be to initiate CyBorD.  Patient reports that he is on a job which requires lot of traveling and unable to come to clinics  regularly do receive his treatment.   ==9/23/24:  Patient and wife here today to discuss upcoming chemotherapy/immunotherapy. An extensive discussion was had which included a thorough discussion of potential side effect profile, risk versus benefits, and expected outcomes.  Risks, including but not limited to, possible hair loss, bone marrow damage, damage to body organs, allergic reactions, sterility, nausea/vomiting, constipation/diarrhea, sores in the mouth, secondary cancers, and rarely death were all discuss.  Specific side effects pertaining to their chemotherapy/immunotherapy medications were discussed as well.  The patient agrees with the plan and all questions and their support systems questions have been answered to their satisfaction.  Premedications were prescribed and patient was educated on appropriate usage.  Patient was educated on when to call, and given the number to call, or to notify the provider including but not  limited to:  Persistent nausea and vomiting, dehydration, fever greater than 100.4 lasting over 1 hour induration or isolated feeders greater than 101, rash while on active chemotherapy or immunotherapy, severe pain or new onset pain not controlled by current pain medication regimen.   ==10/04/2024: Tolerating chemotherapy well, pt has leg cramps has had all of his life worse at night, potassium mg normal, discussed medication, pt would like to try otc topicals such as biofreeze or icyhot with lidocaine first and if not helpful will notify clinic and will send our rx.  No other complaints, will continue chemotherapy, plan to see next week and if continues to tolerate well plan to see every other week.  ==10/11/2024: Magnesium wnl, but hypophosphatemia noted, pt denies diarrhea or antacid use.  Will start gentle supplementation with po multivitamin, discussed with patient his renal function and may need po phosphate if repeat labs show continued low phosphorus, but black box warning in CKD for po phosphate.  Will try multivitamin containing phos as well as increasing dietary intake - reviewed dietary sources of phosphorus.  Will also check vitamin D level, Vit D deficiency can cause/worsen hypophosphatemia and recheck phos level to determine if transient.  Pt is asymptomatic at this time has occasional leg cramps for all of his life, no increase or other symptoms.  Labs reviewed will continue chemotherapy   ==11/22/2024: Phos wnl, tolerating Cybor D well, leg cramps improved with baclofen, labs reviewed patient is cleared for chemotherapy. Will now also make referral to BMT team in [place].   ==12/27/2024: Has appt with Dr. Cardoso 1/16/2025, tolerating CyBorD well.  May use labs from BMT appt on 1/16/25 for chemo scheduled on 1/17/2024, will need cbc cmp. Denies complaints, will continue with CyBorD.  ==02/21/2025: Tolerating CyBorD well, next appt with Dr. Cardoso is on 2/27/2025 for transplant eval. Last  myeloma labs were on 1/24/25 so I will have drawn today so they will be resulted for his visit next week. Whole body PET ct reviewed which showed bony degenerative changes without evidence of bony destruction, no hypermetabolic activity seen and no interval changes from prior study.  Will continue CyBorD at this time.   ==03/07/2025: Pt recently had visit with Dr. Meredith with transplant who recommends patient to switch to Dina+VRD and start Xgeva. He has had dental clearance which was sent to [facility] will be scanned in chart.  Plan to send Dina+VRD as well as xgeva for approval and continue CyborD until approved. Will add hep panel and type and screen to next labs in anticipation of starting Dina  ==03/14/2025:  Patient here today to discuss upcoming chemotherapy/immunotherapy. An extensive discussion was had which included a thorough discussion of potential side effect profile, risk versus benefits, and expected outcomes.  Risks, including but not limited to, possible hair loss, bone marrow damage, damage to body organs, allergic reactions, sterility, nausea/vomiting, constipation/diarrhea, sores in the mouth, secondary cancers, and rarely death were all discuss.  Specific side effects pertaining to their chemotherapy/immunotherapy medications were discussed as well.  The patient agrees with the plan and all questions and their support systems questions have been answered to their satisfaction.  Premedications were prescribed and patient was educated on appropriate usage.  Patient was educated on when to call, and given the number to call, or to notify the provider including but not limited to:  Persistent nausea and vomiting, dehydration, fever greater than 100.4 lasting over 1 hour induration or isolated feeders greater than 101, rash while on active chemotherapy or immunotherapy, severe pain or new onset pain not controlled by current pain medication regimen. Starting Dina-VRD and xgeva today, pt doesn't have  revlimid yet, will cont cytoxan until he receives it.  ==03/21/2025: Tolerated first cycle of VRD well, occasional headaches relived with tylenol and caffeine, labs reviewed, pt is cleared for treatment  ==04/04/2025: Sodium 127, pt having dull headaches, encouraged increasing po sodium, reviewed dietary sources of sodium, will start on sodium tablets if does not increase with dietary salt.  Pt has history of chronic hearing loss for years, but has noticed worse over the past few months, will refer to ENT.  Labs reviewed, pt is cleared for treatment, week of Good Friday will see on Thurs due to holiday  ==04/17/2025: Seeing ENT next week for fluid behind ear drum per pcp and tinnitus, stem cell transplant delayed by bmt to August, will continue dina-vrd. Last xgeva was 4/11 will continue  ==05/09/2025: Tolerating chemo well, due for xgeva as well, had virtual visit with bmt and plan to proceed with stem cell transplant in August. Labs reviewed, pt is cleared for xgeva and chemotherapy    2. Prophylaxis for infection prevention  == Continue Bactrim and acyclovir    3. Hypophosphatemia  ==Asymptomatic, normal magnesium level - pt has had leg cramps all of life, no increase previous phos levels wnl  ==Recheck to determine if transient, avoid antacids, check vitamin D level  ==encourage po intake, will start multivitamin containing phosphorus, may need higher dose if does not resolve, will prescribe with caution given renal function  == 11/22/24: Resolved      Plan:  stop  CyBorD - until start Dina VRD  Continue Dina-VRD  Cbc cmp weekly, myeloma labs q 4 weeks  NP appt in 2 weeks   Continue Bactrim and valtrex prophylaxis  Continue Xgeva - last admin on 5/9/2025        Total time spent in counseling and discussion about further management options including relevant lab work, treatment,  prognosis, medications and intended side effects was more than 40 minutes. More than 50 % of the time was spent in counseling and  coordination of care.  This includes face to face time and non-face to face time preparing to see the patient (eg, review of tests), Obtaining and/or reviewing separately obtained history, Documenting clinical information in the electronic or other health record, Independently interpreting resultsand communicating results to the patient/family/caregiver, or Care coordination.

## 2025-05-19 ENCOUNTER — TELEPHONE (OUTPATIENT)
Dept: HEMATOLOGY/ONCOLOGY | Facility: CLINIC | Age: 55
End: 2025-05-19
Payer: COMMERCIAL

## 2025-05-19 NOTE — TELEPHONE ENCOUNTER
----- Message from Sue sent at 5/19/2025  9:31 AM CDT -----  Patient wife is requesting a call back in regards to changing appointment for labs. Please call him back at [phone] (home)   scraps on L knee/crust

## 2025-05-20 ENCOUNTER — TELEPHONE (OUTPATIENT)
Dept: GASTROENTEROLOGY | Facility: CLINIC | Age: 55
End: 2025-05-20

## 2025-05-20 ENCOUNTER — OFFICE VISIT (OUTPATIENT)
Dept: GASTROENTEROLOGY | Facility: CLINIC | Age: 55
End: 2025-05-20
Payer: COMMERCIAL

## 2025-05-20 VITALS
OXYGEN SATURATION: 96 % | DIASTOLIC BLOOD PRESSURE: 85 MMHG | HEIGHT: 71 IN | SYSTOLIC BLOOD PRESSURE: 143 MMHG | BODY MASS INDEX: 28.28 KG/M2 | HEART RATE: 74 BPM | WEIGHT: 202 LBS

## 2025-05-20 DIAGNOSIS — K21.9 GASTROESOPHAGEAL REFLUX DISEASE, UNSPECIFIED WHETHER ESOPHAGITIS PRESENT: Primary | ICD-10-CM

## 2025-05-20 DIAGNOSIS — Z86.0100 HISTORY OF COLON POLYPS: ICD-10-CM

## 2025-05-20 DIAGNOSIS — K59.09 CHRONIC CONSTIPATION: ICD-10-CM

## 2025-05-20 PROCEDURE — 3008F BODY MASS INDEX DOCD: CPT | Mod: CPTII,,, | Performed by: NURSE PRACTITIONER

## 2025-05-20 PROCEDURE — 3077F SYST BP >= 140 MM HG: CPT | Mod: CPTII,,, | Performed by: NURSE PRACTITIONER

## 2025-05-20 PROCEDURE — 3079F DIAST BP 80-89 MM HG: CPT | Mod: CPTII,,, | Performed by: NURSE PRACTITIONER

## 2025-05-20 PROCEDURE — 99204 OFFICE O/P NEW MOD 45 MIN: CPT | Mod: S$PBB,,, | Performed by: NURSE PRACTITIONER

## 2025-05-20 PROCEDURE — 1160F RVW MEDS BY RX/DR IN RCRD: CPT | Mod: CPTII,,, | Performed by: NURSE PRACTITIONER

## 2025-05-20 PROCEDURE — 1159F MED LIST DOCD IN RCRD: CPT | Mod: CPTII,,, | Performed by: NURSE PRACTITIONER

## 2025-05-20 RX ORDER — POLYETHYLENE GLYCOL 3350, SODIUM SULFATE ANHYDROUS, SODIUM BICARBONATE, SODIUM CHLORIDE, POTASSIUM CHLORIDE 236; 22.74; 6.74; 5.86; 2.97 G/4L; G/4L; G/4L; G/4L; G/4L
4 POWDER, FOR SOLUTION ORAL ONCE
Qty: 4000 ML | Refills: 0 | Status: SHIPPED | OUTPATIENT
Start: 2025-05-20 | End: 2025-05-20

## 2025-05-20 RX ORDER — PANTOPRAZOLE SODIUM 40 MG/1
40 TABLET, DELAYED RELEASE ORAL DAILY
Qty: 90 TABLET | Refills: 1 | Status: SHIPPED | OUTPATIENT
Start: 2025-05-20

## 2025-05-20 RX ORDER — SOD SULF/POT CHLORIDE/MAG SULF 1.479 G
TABLET ORAL
Qty: 24 TABLET | Refills: 0 | Status: SHIPPED | OUTPATIENT
Start: 2025-05-20 | End: 2025-05-20

## 2025-05-20 RX ORDER — CALCIUM CARBONATE 260MG(650)
TABLET,CHEWABLE ORAL
COMMUNITY

## 2025-05-20 NOTE — PROGRESS NOTES
Clinic Note    Reason for visit:  The primary encounter diagnosis was Gastroesophageal reflux disease, unspecified whether esophagitis present. Diagnoses of Chronic constipation and History of colon polyps were also pertinent to this visit.    PCP: Syd Feliciano       HPI:  This is a 54 y.o. male who was previously established with Dr. Pate. Patient with h/o GERD. He states he has had reflux since he was 14 years old. He avoids any inciting foods because he will have severe reflux. He has never been on any antireflux medication. He takes TUMs as needed.  Patient with multiple myeloma currently on chemo, followed by Dr. Saunders. He will have a stem cell transplant in August. He does note the chemo does make his reflux worse. He has chronic constipation. He has BM about once a week. He has been this way all of his life. He took Golytely last colonoscopy and cleaned him out well. His las BM was yesterday.     PET 2/20/2025 no hypermetabolic finding  Is currently on Bactrim while receiving chemo.    EGD/Colonoscopy 11/13/2019 2mm nodule lower thrid of esophagus Bx- reflux, small HH, Gbx wnl, AC TA- repeat 5 years.      Review of Systems   Constitutional:  Positive for fatigue. Negative for diaphoresis, fever and unexpected weight change.   HENT:  Negative for hearing loss, mouth sores, postnasal drip, sore throat and trouble swallowing.    Eyes:  Positive for eye dryness. Negative for pain and discharge.   Respiratory:  Positive for shortness of breath. Negative for apnea, cough, choking, chest tightness and wheezing.    Cardiovascular:  Negative for chest pain, palpitations and leg swelling.   Gastrointestinal:  Negative for abdominal distention, abdominal pain, anal bleeding, blood in stool, change in bowel habit, constipation, diarrhea, nausea, rectal pain, vomiting, reflux and fecal incontinence.   Genitourinary:  Negative for bladder incontinence, dysuria and hematuria.   Musculoskeletal:  Negative for  arthralgias, back pain and joint swelling.   Integumentary:  Negative for color change and rash.   Allergic/Immunologic: Negative for environmental allergies and food allergies.   Neurological:  Negative for seizures and headaches.   Hematological:  Negative for adenopathy. Does not bruise/bleed easily.        Past Medical History:   Diagnosis Date    CKD (chronic kidney disease)     History of colonic polyps     Multiple myeloma not having achieved remission     TMJ (dislocation of temporomandibular joint)      Past Surgical History:   Procedure Laterality Date    BONE BIOPSY N/A      Family History   Problem Relation Name Age of Onset    Hypertension Mother      Diabetes Mother      Skin cancer Mother      Hypertension Father      Lymphoma Sister      Ulcerative colitis Neg Hx      Crohn's disease Neg Hx      Pancreatic cancer Neg Hx      Liver disease Neg Hx      Stomach cancer Neg Hx      Colon cancer Neg Hx      Throat cancer Neg Hx      Esophageal cancer Neg Hx       Social History[1]  Review of patient's allergies indicates:   Allergen Reactions    Penicillins Rash    Baclofen Other (See Comments)     Can't sit still, causes body to twitch    Bacitracin Rash      Medication List with Changes/Refills   New Medications    PANTOPRAZOLE (PROTONIX) 40 MG TABLET    Take 1 tablet (40 mg total) by mouth once daily.    POLYETHYLENE GLYCOL (GOLYTELY) 236-22.74-6.74 -5.86 GRAM SUSPENSION    Take 4,000 mLs (4 L total) by mouth once. for 1 dose   Current Medications    ACYCLOVIR (ZOVIRAX) 400 MG TABLET    Take 1 tablet (400 mg total) by mouth 2 (two) times daily.    ASPIRIN 81 MG CHEW    Take 1 tablet (81 mg total) by mouth once daily.    CALCIUM CARBONATE 260 MG CALCIUM (650 MG) CHEW    Take by mouth.    CETIRIZINE (ZYRTEC) 10 MG TABLET    Take 1 tablet (10 mg total) by mouth once daily.    CYCLOBENZAPRINE (FLEXERIL) 10 MG TABLET    TAKE 1/2 TO 1 (ONE-HALF TO ONE) TABLET BY MOUTH THREE TIMES DAILY AS NEEDED FOR MUSCLE  "SPASM    DEXAMETHASONE (DECADRON) 4 MG TAB    Take 10 tablets (40 mg total) by mouth every 7 days. on days 1, 8, 15, and 22 of each chemotherapy cycle. Take with food.    DEXAMETHASONE (DECADRON) 4 MG TAB    Take 10 tablets (40 mg total) by mouth every 7 days. Take with food.    DIPHENHYDRAMINE (BENADRYL) 25 MG CAPSULE    Take 25 mg by mouth.    ESCITALOPRAM OXALATE (LEXAPRO) 10 MG TABLET    Take 1 tablet (10 mg total) by mouth once daily.    FLUTICASONE PROPIONATE (FLONASE) 50 MCG/ACTUATION NASAL SPRAY    1 spray (50 mcg total) by Each Nostril route once daily.    LENALIDOMIDE (REVLIMID) 25 MG CAP    TAKE 1 CAPSULE ONCE DAILY FOR 21 DAYS ON, THEN 7 DAYS OFF    METHOCARBAMOL (ROBAXIN) 750 MG TAB    Take by mouth.    MONTELUKAST (SINGULAIR) 10 MG TABLET    Take 10 mg by mouth.    MULTIVITAMIN TAB    Take 1 tablet by mouth.    NEOMYCIN-POLYMYXIN-HYDROCORTISONE (CORTISPORIN) 3.5-10,000-1 MG/ML-UNIT/ML-% OTIC SUSPENSION    INSTILL 4 DROPS INTO AFFECTED EAR(S) 2 TO 3 TIMES DAILY    ONDANSETRON (ZOFRAN) 8 MG TABLET    Take 1 tablet (8 mg total) by mouth every 8 (eight) hours as needed.    PROCHLORPERAZINE (COMPAZINE) 5 MG TABLET    Take 2 tablets (10 mg total) by mouth every 6 (six) hours as needed for Nausea.    SULFAMETHOXAZOLE-TRIMETHOPRIM 400-80MG (BACTRIM) 400-80 MG PER TABLET    Take 1 tablet by mouth once daily. for 360 doses   Discontinued Medications    ACYCLOVIR (ZOVIRAX) 400 MG TABLET    Take 1 tablet (400 mg total) by mouth once daily.    LENALIDOMIDE 2.5 MG CAP             Vital Signs:  BP (!) 143/85 (BP Location: Left arm, Patient Position: Sitting)   Pulse 74   Ht 5' 11" (1.803 m)   Wt 91.6 kg (202 lb)   SpO2 96%   BMI 28.17 kg/m²        Physical Exam  Constitutional:       General: He is not in acute distress.     Appearance: Normal appearance. He is well-developed. He is not ill-appearing or toxic-appearing.   HENT:      Head: Normocephalic and atraumatic.      Nose: Nose normal.      Mouth/Throat: "      Mouth: Mucous membranes are moist.      Pharynx: Oropharynx is clear. No oropharyngeal exudate or posterior oropharyngeal erythema.   Eyes:      General: Lids are normal. No scleral icterus.        Right eye: No discharge.         Left eye: No discharge.      Conjunctiva/sclera: Conjunctivae normal.   Cardiovascular:      Rate and Rhythm: Normal rate and regular rhythm.      Pulses:           Radial pulses are 2+ on the right side and 2+ on the left side.   Pulmonary:      Effort: Pulmonary effort is normal. No respiratory distress.      Breath sounds: No stridor. No wheezing.   Abdominal:      General: Bowel sounds are normal. There is no distension.      Palpations: Abdomen is soft. There is no fluid wave, hepatomegaly, splenomegaly or mass.      Tenderness: There is no abdominal tenderness. There is no guarding or rebound.      Comments: Prominent xiphoid process   Lymphadenopathy:      Cervical: No cervical adenopathy.   Skin:     General: Skin is warm and dry.      Capillary Refill: Capillary refill takes less than 2 seconds.      Coloration: Skin is not cyanotic, jaundiced or pale.   Neurological:      General: No focal deficit present.      Mental Status: He is alert and oriented to person, place, and time.   Psychiatric:         Mood and Affect: Mood normal.         Behavior: Behavior is cooperative.            All of the data above and below has been reviewed by myself and any further interpretations will be reflected in the assessment and plan.   The data includes review of external notes, and independent interpretation of lab results, procedures, x-rays, and imaging reports.      Assessment:  Gastroesophageal reflux disease, unspecified whether esophagitis present  -     Ambulatory Referral to External Surgery  -     pantoprazole (PROTONIX) 40 MG tablet; Take 1 tablet (40 mg total) by mouth once daily.  Dispense: 90 tablet; Refill: 1    Chronic constipation    History of colon polyps  -      Ambulatory Referral to External Surgery  -     Discontinue: sod sulf-pot chloride-mag sulf (SUTAB) 1.479-0.188- 0.225 gram tablet; Take according to package instructions with indicated amount of water. No breakfast day before test. May substitute with Suprep, Clenpiq, Plenvu, Moviprep or GoLytely based on Rx plan and patient preference.  Dispense: 24 tablet; Refill: 0  -     polyethylene glycol (GOLYTELY) 236-22.74-6.74 -5.86 gram suspension; Take 4,000 mLs (4 L total) by mouth once. for 1 dose  Dispense: 4000 mL; Refill: 0       GERD- Will send out pantoprazole to take as needed.   Hx colon polyps- due for colonoscopy  Chronic constipation- BM once a week at baseline. Start MiraLax BID prior to colonoscopy.    Recommendations:    Schedule upper and lower endoscopy 5/23/2025  Start MiraLax 1 capful twice daily until colonoscopy.  May take pantoprazole 40mg as needed.    If any tests, procedures, or imaging has been ordered and you are not contacted to schedule within 1-2 weeks, then you may call the central scheduling department directly at [phone].     Risks, benefits, and alternatives of medical management, any associated procedures, and/or treatment discussed with the patient. Patient given opportunity to ask questions and voices understanding. Patient has elected to proceed with the recommended care modalities as discussed.    Follow up if symptoms worsen or fail to improve.    Order summary:  Orders Placed This Encounter   Procedures    Ambulatory Referral to External Surgery          Instructed patient to notify my office if they have not been contacted within two weeks after any procedures, submitting any samples (biopsies, blood, stool, urine, etc.) or after any imaging (X-ray, CT, MRI, etc.).      Maria Antonia Duffy NP    This document may have been created using a voice recognition transcribing system. Incorrect words or phrases may have been missed during proofreading. Please interpret accordingly or  contact me for clarification.          [1]   Social History  Tobacco Use    Smoking status: Never     Passive exposure: Current    Smokeless tobacco: Former     Types: Chew   Substance Use Topics    Alcohol use: Not Currently    Drug use: Not Currently     Types: Benzodiazepines

## 2025-05-20 NOTE — PATIENT INSTRUCTIONS
Schedule upper and lower endoscopy 5/23/2025  Start MiraLax 1 capful twice daily until colonoscopy.  May take pantoprazole 40mg as needed.    If any tests, procedures, or imaging has been ordered and you are not contacted to schedule within 1-2 weeks, then you may call the central scheduling department directly at [phone].   Please notify my office if you have not been contacted within two weeks after any procedures, submitting any samples (biopsies, blood, stool, urine, etc.) or after any imaging (X-ray, CT, MRI, etc.).

## 2025-05-20 NOTE — TELEPHONE ENCOUNTER
Patient was given instructions and they were reviewed with patient. Patient was given AVS with apt details. Patients order was faxed to central scheduling at [facility]. No pa required. LRA 5/20/25

## 2025-05-21 ENCOUNTER — PATIENT MESSAGE (OUTPATIENT)
Dept: HEMATOLOGY/ONCOLOGY | Facility: CLINIC | Age: 55
End: 2025-05-21

## 2025-05-22 ENCOUNTER — OFFICE VISIT (OUTPATIENT)
Dept: HEMATOLOGY/ONCOLOGY | Facility: CLINIC | Age: 55
End: 2025-05-22
Payer: COMMERCIAL

## 2025-05-22 VITALS
HEART RATE: 64 BPM | DIASTOLIC BLOOD PRESSURE: 85 MMHG | TEMPERATURE: 98 F | RESPIRATION RATE: 16 BRPM | OXYGEN SATURATION: 96 % | HEIGHT: 71 IN | SYSTOLIC BLOOD PRESSURE: 124 MMHG | WEIGHT: 200.81 LBS | BODY MASS INDEX: 28.11 KG/M2

## 2025-05-22 DIAGNOSIS — Z76.82 STEM CELL TRANSPLANT CANDIDATE: ICD-10-CM

## 2025-05-22 DIAGNOSIS — C90.00 MULTIPLE MYELOMA NOT HAVING ACHIEVED REMISSION: Primary | ICD-10-CM

## 2025-05-22 DIAGNOSIS — N18.32 STAGE 3B CHRONIC KIDNEY DISEASE: ICD-10-CM

## 2025-05-22 DIAGNOSIS — D84.81 IMMUNODEFICIENCY DUE TO CONDITIONS CLASSIFIED ELSEWHERE: ICD-10-CM

## 2025-05-22 PROCEDURE — 3008F BODY MASS INDEX DOCD: CPT | Mod: CPTII,,, | Performed by: NURSE PRACTITIONER

## 2025-05-22 PROCEDURE — 3074F SYST BP LT 130 MM HG: CPT | Mod: CPTII,,, | Performed by: NURSE PRACTITIONER

## 2025-05-22 PROCEDURE — 3079F DIAST BP 80-89 MM HG: CPT | Mod: CPTII,,, | Performed by: NURSE PRACTITIONER

## 2025-05-22 PROCEDURE — 99215 OFFICE O/P EST HI 40 MIN: CPT | Mod: S$PBB,,, | Performed by: NURSE PRACTITIONER

## 2025-05-22 PROCEDURE — G2211 COMPLEX E/M VISIT ADD ON: HCPCS | Mod: S$PBB,,, | Performed by: NURSE PRACTITIONER

## 2025-05-22 PROCEDURE — 1159F MED LIST DOCD IN RCRD: CPT | Mod: CPTII,,, | Performed by: NURSE PRACTITIONER

## 2025-05-22 RX ORDER — BORTEZOMIB 3.5 MG/1
1.3 INJECTION, POWDER, LYOPHILIZED, FOR SOLUTION INTRAVENOUS; SUBCUTANEOUS
OUTPATIENT
Start: 2025-06-12

## 2025-05-22 RX ORDER — DIPHENHYDRAMINE HCL 25 MG
25 CAPSULE ORAL
OUTPATIENT
Start: 2025-05-29

## 2025-05-22 RX ORDER — DIPHENHYDRAMINE HYDROCHLORIDE 50 MG/ML
50 INJECTION, SOLUTION INTRAMUSCULAR; INTRAVENOUS ONCE AS NEEDED
OUTPATIENT
Start: 2025-06-12

## 2025-05-22 RX ORDER — ACETAMINOPHEN 325 MG/1
650 TABLET ORAL
OUTPATIENT
Start: 2025-06-05

## 2025-05-22 RX ORDER — DIPHENHYDRAMINE HCL 25 MG
25 CAPSULE ORAL
OUTPATIENT
Start: 2025-06-12

## 2025-05-22 RX ORDER — BORTEZOMIB 3.5 MG/1
1.3 INJECTION, POWDER, LYOPHILIZED, FOR SOLUTION INTRAVENOUS; SUBCUTANEOUS
OUTPATIENT
Start: 2025-06-05

## 2025-05-22 RX ORDER — HEPARIN 100 UNIT/ML
500 SYRINGE INTRAVENOUS
OUTPATIENT
Start: 2025-06-05

## 2025-05-22 RX ORDER — HEPARIN 100 UNIT/ML
500 SYRINGE INTRAVENOUS
OUTPATIENT
Start: 2025-05-29

## 2025-05-22 RX ORDER — PROCHLORPERAZINE EDISYLATE 5 MG/ML
10 INJECTION INTRAMUSCULAR; INTRAVENOUS ONCE AS NEEDED
OUTPATIENT
Start: 2025-05-29

## 2025-05-22 RX ORDER — SODIUM CHLORIDE 0.9 % (FLUSH) 0.9 %
10 SYRINGE (ML) INJECTION
OUTPATIENT
Start: 2025-06-12

## 2025-05-22 RX ORDER — DIPHENHYDRAMINE HCL 25 MG
25 CAPSULE ORAL
OUTPATIENT
Start: 2025-06-05

## 2025-05-22 RX ORDER — DIPHENHYDRAMINE HYDROCHLORIDE 50 MG/ML
50 INJECTION, SOLUTION INTRAMUSCULAR; INTRAVENOUS ONCE AS NEEDED
OUTPATIENT
Start: 2025-06-05

## 2025-05-22 RX ORDER — DIPHENHYDRAMINE HYDROCHLORIDE 50 MG/ML
50 INJECTION, SOLUTION INTRAMUSCULAR; INTRAVENOUS ONCE AS NEEDED
OUTPATIENT
Start: 2025-05-22

## 2025-05-22 RX ORDER — DIPHENHYDRAMINE HCL 25 MG
25 CAPSULE ORAL
OUTPATIENT
Start: 2025-05-22

## 2025-05-22 RX ORDER — LENALIDOMIDE 25 MG/1
CAPSULE ORAL
Qty: 21 CAPSULE | Refills: 0 | Status: SHIPPED | OUTPATIENT
Start: 2025-05-22 | End: 2025-06-12

## 2025-05-22 RX ORDER — PROCHLORPERAZINE EDISYLATE 5 MG/ML
10 INJECTION INTRAMUSCULAR; INTRAVENOUS ONCE AS NEEDED
OUTPATIENT
Start: 2025-06-05

## 2025-05-22 RX ORDER — HEPARIN 100 UNIT/ML
500 SYRINGE INTRAVENOUS
OUTPATIENT
Start: 2025-05-22

## 2025-05-22 RX ORDER — DIPHENHYDRAMINE HYDROCHLORIDE 50 MG/ML
50 INJECTION, SOLUTION INTRAMUSCULAR; INTRAVENOUS ONCE AS NEEDED
OUTPATIENT
Start: 2025-05-29

## 2025-05-22 RX ORDER — SODIUM CHLORIDE 0.9 % (FLUSH) 0.9 %
10 SYRINGE (ML) INJECTION
OUTPATIENT
Start: 2025-05-29

## 2025-05-22 RX ORDER — PROCHLORPERAZINE EDISYLATE 5 MG/ML
10 INJECTION INTRAMUSCULAR; INTRAVENOUS ONCE AS NEEDED
OUTPATIENT
Start: 2025-06-12

## 2025-05-22 RX ORDER — HEPARIN 100 UNIT/ML
500 SYRINGE INTRAVENOUS
OUTPATIENT
Start: 2025-06-12

## 2025-05-22 RX ORDER — EPINEPHRINE 0.3 MG/.3ML
0.3 INJECTION SUBCUTANEOUS ONCE AS NEEDED
OUTPATIENT
Start: 2025-05-29

## 2025-05-22 RX ORDER — SODIUM CHLORIDE 0.9 % (FLUSH) 0.9 %
10 SYRINGE (ML) INJECTION
OUTPATIENT
Start: 2025-06-05

## 2025-05-22 RX ORDER — SODIUM CHLORIDE 0.9 % (FLUSH) 0.9 %
10 SYRINGE (ML) INJECTION
OUTPATIENT
Start: 2025-05-22

## 2025-05-22 RX ORDER — BORTEZOMIB 3.5 MG/1
1.3 INJECTION, POWDER, LYOPHILIZED, FOR SOLUTION INTRAVENOUS; SUBCUTANEOUS
OUTPATIENT
Start: 2025-05-22

## 2025-05-22 RX ORDER — PROCHLORPERAZINE EDISYLATE 5 MG/ML
10 INJECTION INTRAMUSCULAR; INTRAVENOUS ONCE AS NEEDED
OUTPATIENT
Start: 2025-05-22

## 2025-05-22 RX ORDER — EPINEPHRINE 0.3 MG/.3ML
0.3 INJECTION SUBCUTANEOUS ONCE AS NEEDED
OUTPATIENT
Start: 2025-06-05

## 2025-05-22 RX ORDER — EPINEPHRINE 0.3 MG/.3ML
0.3 INJECTION SUBCUTANEOUS ONCE AS NEEDED
OUTPATIENT
Start: 2025-05-22

## 2025-05-22 RX ORDER — BORTEZOMIB 3.5 MG/1
1.3 INJECTION, POWDER, LYOPHILIZED, FOR SOLUTION INTRAVENOUS; SUBCUTANEOUS
OUTPATIENT
Start: 2025-05-29

## 2025-05-22 RX ORDER — EPINEPHRINE 0.3 MG/.3ML
0.3 INJECTION SUBCUTANEOUS ONCE AS NEEDED
OUTPATIENT
Start: 2025-06-12

## 2025-05-22 RX ORDER — ACETAMINOPHEN 325 MG/1
650 TABLET ORAL
OUTPATIENT
Start: 2025-06-12

## 2025-05-22 RX ORDER — ACETAMINOPHEN 325 MG/1
650 TABLET ORAL
OUTPATIENT
Start: 2025-05-22

## 2025-05-22 RX ORDER — ACETAMINOPHEN 325 MG/1
650 TABLET ORAL
OUTPATIENT
Start: 2025-05-29

## 2025-05-22 NOTE — PROGRESS NOTES
HEMATOLOGY FOLLOW UP CONSULTATION NOTE    Patient ID: Miky Carrasco is a 54 y.o. male.    Chief Complaint:  Multiple myeloma    HPI:  Patient is a 53-year-old male with past medical history of hyperlipidemia who recently was evaluated by Nephrology for worsening kidney function and underwent further lab work including serum protein electrophoresis which showed findings concerning for possible MGUS versus active multiple myeloma with decline in kidney.  Patient presents to our clinic today for further evaluation.  Voices no acute complaints. Denies bone pain, activity and appetite changes.              Past Medical History:   Diagnosis Date    CKD (chronic kidney disease)     History of colonic polyps     Multiple myeloma not having achieved remission     TMJ (dislocation of temporomandibular joint)        Family History   Problem Relation Name Age of Onset    Hypertension Mother      Diabetes Mother      Skin cancer Mother      Hypertension Father      Lymphoma Sister      Ulcerative colitis Neg Hx      Crohn's disease Neg Hx      Pancreatic cancer Neg Hx      Liver disease Neg Hx      Stomach cancer Neg Hx      Colon cancer Neg Hx      Throat cancer Neg Hx      Esophageal cancer Neg Hx         Social History     Socioeconomic History    Marital status:    Tobacco Use    Smoking status: Never     Passive exposure: Current    Smokeless tobacco: Former     Types: Chew   Substance and Sexual Activity    Alcohol use: Not Currently    Drug use: Not Currently     Types: Benzodiazepines    Sexual activity: Yes     Social Drivers of Health     Financial Resource Strain: Low Risk  (4/17/2025)    Received from [facility]    Overall Financial Resource Strain (CARDIA)     Difficulty of Paying Living Expenses: Not hard at all   Food Insecurity: No Food Insecurity (4/17/2025)    Received from [facility]    Hunger Vital Sign     Worried About Running Out of Food in the  Last Year: Never true     Ran Out of Food in the Last Year: Never true   Transportation Needs: No Transportation Needs (4/17/2025)    Received from [facility]    PRAPARE - Transportation     Lack of Transportation (Medical): No     Lack of Transportation (Non-Medical): No   Physical Activity: Insufficiently Active (2/20/2025)    Exercise Vital Sign     Days of Exercise per Week: 1 day     Minutes of Exercise per Session: 30 min   Stress: No Stress Concern Present (4/17/2025)    Received from [facility]    [employer] - Occupational Stress Questionnaire     Feeling of Stress : Not at all   Housing Stability: Unknown (4/17/2025)    Received from [facility]    Housing Stability Vital Sign     Unable to Pay for Housing in the Last Year: No     Homeless in the Last Year: No         Past Surgical History:   Procedure Laterality Date    BONE BIOPSY N/A          Review of systems:  Review of Systems   Constitutional:  Negative for activity change, appetite change, chills, diaphoresis, fatigue and unexpected weight change.   HENT:  Negative for congestion, facial swelling, hearing loss, mouth sores, trouble swallowing and voice change.    Eyes:  Negative for photophobia, pain, discharge and itching.   Respiratory:  Negative for apnea, cough, choking, chest tightness and shortness of breath.    Cardiovascular:  Negative for chest pain, palpitations and leg swelling.   Gastrointestinal:  Negative for abdominal distention, abdominal pain, anal bleeding and blood in stool.   Endocrine: Negative for cold intolerance, heat intolerance, polydipsia and polyphagia.   Genitourinary:  Negative for difficulty urinating, dysuria, flank pain and hematuria.   Musculoskeletal:  Negative for arthralgias, back pain, joint swelling, myalgias, neck pain and neck stiffness.   Skin:  Negative for color change, pallor and wound.    Allergic/Immunologic: Negative for environmental allergies, food allergies and immunocompromised state.   Neurological:  Negative for dizziness, seizures, facial asymmetry, speech difficulty, light-headedness, numbness and headaches.   Hematological:  Negative for adenopathy. Does not bruise/bleed easily.   Psychiatric/Behavioral:  Negative for agitation, behavioral problems, confusion, decreased concentration and sleep disturbance.                                Physical Exam  Vitals and nursing note reviewed.   Constitutional:       General: He is not in acute distress.     Appearance: Normal appearance. He is not ill-appearing.   HENT:      Head: Normocephalic and atraumatic.      Nose: No congestion or rhinorrhea.   Eyes:      General: No scleral icterus.     Extraocular Movements: Extraocular movements intact.      Pupils: Pupils are equal, round, and reactive to light.   Cardiovascular:      Rate and Rhythm: Normal rate and regular rhythm.      Pulses: Normal pulses.      Heart sounds: Normal heart sounds. No murmur heard.     No gallop.   Pulmonary:      Effort: Pulmonary effort is normal. No respiratory distress.      Breath sounds: Normal breath sounds. No stridor. No wheezing or rhonchi.   Abdominal:      General: Bowel sounds are normal. There is no distension.      Palpations: There is no mass.      Tenderness: There is no abdominal tenderness. There is no guarding.   Musculoskeletal:         General: No swelling, tenderness, deformity or signs of injury. Normal range of motion.      Cervical back: Normal range of motion and neck supple. No rigidity. No muscular tenderness.      Right lower leg: No edema.      Left lower leg: No edema.   Skin:     General: Skin is warm.      Coloration: Skin is not jaundiced or pale.      Findings: No bruising or lesion.   Neurological:      General: No focal deficit present.      Mental Status: He is alert and oriented to person, place, and time.      Cranial Nerves:  No cranial nerve deficit.      Sensory: No sensory deficit.      Motor: No weakness.      Gait: Gait normal.   Psychiatric:         Mood and Affect: Mood normal.         Behavior: Behavior normal.         Thought Content: Thought content normal.     Vitals:    05/22/25 0850   BP: 124/85   Pulse: 64   Resp: 16   Temp: 98.2 °F (36.8 °C)           Body surface area is 2.14 meters squared.    Assessment/Plan:      ECOG 1    Multiple myeloma:    == reviewed prior lab work done with Nephrology.  Patient is noted to have worsening renal failure and also has presence of monoclonal band on immuno fixation studies.  I will initiate workup for multiple myeloma and will include a bone marrow biopsy as well as a PET scan to rule out any osteolytic lesions.  Discussed these findings in detail with patient giving him opportunity to ask questions  == 8/18/24:  Bone marrow biopsy done showed increased number of plasma cells.  No percentage mentioned but reported to be more than normal and in the smoldering range.  With evidence of worsening renal failure along with elevated kappa free light chains and kappa/lambda free light chain ratio findings are consistent with active multiple myeloma.  Due to decline in kidney function my initial plan would be to initiate CyBorD.  Patient reports that he is on a job which requires lot of traveling and unable to come to clinics  regularly do receive his treatment.   ==9/23/24:  Patient and wife here today to discuss upcoming chemotherapy/immunotherapy. An extensive discussion was had which included a thorough discussion of potential side effect profile, risk versus benefits, and expected outcomes.  Risks, including but not limited to, possible hair loss, bone marrow damage, damage to body organs, allergic reactions, sterility, nausea/vomiting, constipation/diarrhea, sores in the mouth, secondary cancers, and rarely death were all discuss.  Specific side effects pertaining to their  chemotherapy/immunotherapy medications were discussed as well.  The patient agrees with the plan and all questions and their support systems questions have been answered to their satisfaction.  Premedications were prescribed and patient was educated on appropriate usage.  Patient was educated on when to call, and given the number to call, or to notify the provider including but not limited to:  Persistent nausea and vomiting, dehydration, fever greater than 100.4 lasting over 1 hour induration or isolated feeders greater than 101, rash while on active chemotherapy or immunotherapy, severe pain or new onset pain not controlled by current pain medication regimen.   ==10/04/2024: Tolerating chemotherapy well, pt has leg cramps has had all of his life worse at night, potassium mg normal, discussed medication, pt would like to try otc topicals such as biofreeze or icyhot with lidocaine first and if not helpful will notify clinic and will send our rx.  No other complaints, will continue chemotherapy, plan to see next week and if continues to tolerate well plan to see every other week.  ==10/11/2024: Magnesium wnl, but hypophosphatemia noted, pt denies diarrhea or antacid use.  Will start gentle supplementation with po multivitamin, discussed with patient his renal function and may need po phosphate if repeat labs show continued low phosphorus, but black box warning in CKD for po phosphate.  Will try multivitamin containing phos as well as increasing dietary intake - reviewed dietary sources of phosphorus.  Will also check vitamin D level, Vit D deficiency can cause/worsen hypophosphatemia and recheck phos level to determine if transient.  Pt is asymptomatic at this time has occasional leg cramps for all of his life, no increase or other symptoms.  Labs reviewed will continue chemotherapy   ==11/22/2024: Phos wnl, tolerating Cybor D well, leg cramps improved with baclofen, labs reviewed patient is cleared for chemotherapy.  Will now also make referral to BMT team in [place].   ==12/27/2024: Has appt with Dr. Cardoso 1/16/2025, tolerating CyBorD well.  May use labs from BMT appt on 1/16/25 for chemo scheduled on 1/17/2024, will need cbc cmp. Denies complaints, will continue with CyBorD.  ==02/21/2025: Tolerating CyBorD well, next appt with Dr. Cardoso is on 2/27/2025 for transplant eval. Last myeloma labs were on 1/24/25 so I will have drawn today so they will be resulted for his visit next week. Whole body PET ct reviewed which showed bony degenerative changes without evidence of bony destruction, no hypermetabolic activity seen and no interval changes from prior study.  Will continue CyBorD at this time.   ==03/07/2025: Pt recently had visit with Dr. Meredith with transplant who recommends patient to switch to Dina+VRD and start Xgeva. He has had dental clearance which was sent to [facility] will be scanned in chart.  Plan to send Dina+VRD as well as xgeva for approval and continue CyborD until approved. Will add hep panel and type and screen to next labs in anticipation of starting Dina  ==03/14/2025:  Patient here today to discuss upcoming chemotherapy/immunotherapy. An extensive discussion was had which included a thorough discussion of potential side effect profile, risk versus benefits, and expected outcomes.  Risks, including but not limited to, possible hair loss, bone marrow damage, damage to body organs, allergic reactions, sterility, nausea/vomiting, constipation/diarrhea, sores in the mouth, secondary cancers, and rarely death were all discuss.  Specific side effects pertaining to their chemotherapy/immunotherapy medications were discussed as well.  The patient agrees with the plan and all questions and their support systems questions have been answered to their satisfaction.  Premedications were prescribed and patient was educated on appropriate usage.  Patient was educated on when to call, and given the number to call, or  to notify the provider including but not limited to:  Persistent nausea and vomiting, dehydration, fever greater than 100.4 lasting over 1 hour induration or isolated feeders greater than 101, rash while on active chemotherapy or immunotherapy, severe pain or new onset pain not controlled by current pain medication regimen. Starting Dina-VRD and xgeva today, pt doesn't have revlimid yet, will cont cytoxan until he receives it.  ==03/21/2025: Tolerated first cycle of VRD well, occasional headaches relived with tylenol and caffeine, labs reviewed, pt is cleared for treatment  ==04/04/2025: Sodium 127, pt having dull headaches, encouraged increasing po sodium, reviewed dietary sources of sodium, will start on sodium tablets if does not increase with dietary salt.  Pt has history of chronic hearing loss for years, but has noticed worse over the past few months, will refer to ENT.  Labs reviewed, pt is cleared for treatment, week of Good Friday will see on Thurs due to holiday  ==04/17/2025: Seeing ENT next week for fluid behind ear drum per pcp and tinnitus, stem cell transplant delayed by bmt to August, will continue dina-vrd. Last xgeva was 4/11 will continue  ==05/09/2025: Tolerating chemo well, due for xgeva as well, had virtual visit with bmt and plan to proceed with stem cell transplant in August. Labs reviewed, pt is cleared for xgeva and chemotherapy  ==05/22/2025: Tolerating therapy well, myeloma labs drawn, not resulted yet. Labs reviewed, pt is cleared for chemotherapy    2. Prophylaxis for infection prevention  == Continue Bactrim and acyclovir    3. Hypophosphatemia  ==Asymptomatic, normal magnesium level - pt has had leg cramps all of life, no increase previous phos levels wnl  ==Recheck to determine if transient, avoid antacids, check vitamin D level  ==encourage po intake, will start multivitamin containing phosphorus, may need higher dose if does not resolve, will prescribe with caution given renal  function  == 11/22/24: Resolved      Plan:  stop  CyBorD - until start Dina VRD  Continue Dina-VRD  Cbc cmp weekly, myeloma labs q 4 weeks  NP appt in 2 weeks   Continue Bactrim and valtrex prophylaxis  Continue Xgeva - last admin on 5/9/2025        Total time spent in counseling and discussion about further management options including relevant lab work, treatment,  prognosis, medications and intended side effects was more than 40 minutes. More than 50 % of the time was spent in counseling and coordination of care.  This includes face to face time and non-face to face time preparing to see the patient (eg, review of tests), Obtaining and/or reviewing separately obtained history, Documenting clinical information in the electronic or other health record, Independently interpreting resultsand communicating results to the patient/family/caregiver, or Care coordination.

## 2025-05-23 ENCOUNTER — OUTSIDE PLACE OF SERVICE (OUTPATIENT)
Dept: GASTROENTEROLOGY | Facility: CLINIC | Age: 55
End: 2025-05-23

## 2025-05-23 PROCEDURE — 43235 EGD DIAGNOSTIC BRUSH WASH: CPT | Mod: 51,,, | Performed by: INTERNAL MEDICINE

## 2025-05-23 PROCEDURE — 45385 COLONOSCOPY W/LESION REMOVAL: CPT | Mod: 33,,, | Performed by: INTERNAL MEDICINE

## 2025-05-24 DIAGNOSIS — C90.00 MULTIPLE MYELOMA NOT HAVING ACHIEVED REMISSION: ICD-10-CM

## 2025-05-27 RX ORDER — ESCITALOPRAM OXALATE 10 MG/1
10 TABLET ORAL
Qty: 30 TABLET | Refills: 0 | Status: SHIPPED | OUTPATIENT
Start: 2025-05-27

## 2025-05-29 ENCOUNTER — OFFICE VISIT (OUTPATIENT)
Dept: HEMATOLOGY/ONCOLOGY | Facility: CLINIC | Age: 55
End: 2025-05-29
Payer: COMMERCIAL

## 2025-05-29 VITALS
HEIGHT: 71 IN | OXYGEN SATURATION: 96 % | HEART RATE: 62 BPM | BODY MASS INDEX: 28.09 KG/M2 | SYSTOLIC BLOOD PRESSURE: 115 MMHG | DIASTOLIC BLOOD PRESSURE: 79 MMHG | TEMPERATURE: 99 F | WEIGHT: 200.63 LBS | RESPIRATION RATE: 16 BRPM

## 2025-05-29 DIAGNOSIS — C90.00 MULTIPLE MYELOMA NOT HAVING ACHIEVED REMISSION: Primary | ICD-10-CM

## 2025-05-29 DIAGNOSIS — Z76.82 STEM CELL TRANSPLANT CANDIDATE: ICD-10-CM

## 2025-05-29 DIAGNOSIS — D84.81 IMMUNODEFICIENCY DUE TO CONDITIONS CLASSIFIED ELSEWHERE: ICD-10-CM

## 2025-05-29 DIAGNOSIS — N18.32 STAGE 3B CHRONIC KIDNEY DISEASE: ICD-10-CM

## 2025-05-29 PROCEDURE — 1159F MED LIST DOCD IN RCRD: CPT | Mod: CPTII,,, | Performed by: NURSE PRACTITIONER

## 2025-05-29 PROCEDURE — 3008F BODY MASS INDEX DOCD: CPT | Mod: CPTII,,, | Performed by: NURSE PRACTITIONER

## 2025-05-29 PROCEDURE — 3074F SYST BP LT 130 MM HG: CPT | Mod: CPTII,,, | Performed by: NURSE PRACTITIONER

## 2025-05-29 PROCEDURE — 99214 OFFICE O/P EST MOD 30 MIN: CPT | Mod: S$PBB,,, | Performed by: NURSE PRACTITIONER

## 2025-05-29 PROCEDURE — G2211 COMPLEX E/M VISIT ADD ON: HCPCS | Mod: S$PBB,,, | Performed by: NURSE PRACTITIONER

## 2025-05-29 PROCEDURE — 3078F DIAST BP <80 MM HG: CPT | Mod: CPTII,,, | Performed by: NURSE PRACTITIONER

## 2025-05-29 NOTE — PROGRESS NOTES
HEMATOLOGY FOLLOW UP CONSULTATION NOTE    Patient ID: Miky Carrasco is a 54 y.o. male.    Chief Complaint:  Multiple myeloma    HPI:  Patient is a 53-year-old male with past medical history of hyperlipidemia who recently was evaluated by Nephrology for worsening kidney function and underwent further lab work including serum protein electrophoresis which showed findings concerning for possible MGUS versus active multiple myeloma with decline in kidney.  Patient presents to our clinic today for further evaluation.  Voices no acute complaints. Denies bone pain, activity and appetite changes.              Past Medical History:   Diagnosis Date    CKD (chronic kidney disease)     History of colonic polyps     Multiple myeloma not having achieved remission     TMJ (dislocation of temporomandibular joint)        Family History   Problem Relation Name Age of Onset    Hypertension Mother      Diabetes Mother      Skin cancer Mother      Hypertension Father      Lymphoma Sister      Ulcerative colitis Neg Hx      Crohn's disease Neg Hx      Pancreatic cancer Neg Hx      Liver disease Neg Hx      Stomach cancer Neg Hx      Colon cancer Neg Hx      Throat cancer Neg Hx      Esophageal cancer Neg Hx         Social History     Socioeconomic History    Marital status:    Tobacco Use    Smoking status: Never     Passive exposure: Current    Smokeless tobacco: Former     Types: Chew   Substance and Sexual Activity    Alcohol use: Not Currently    Drug use: Not Currently     Types: Benzodiazepines    Sexual activity: Yes     Social Drivers of Health     Financial Resource Strain: Low Risk  (4/17/2025)    Received from [facility]    Overall Financial Resource Strain (CARDIA)     Difficulty of Paying Living Expenses: Not hard at all   Food Insecurity: No Food Insecurity (4/17/2025)    Received from [facility]    Hunger Vital Sign     Worried About  Running Out of Food in the Last Year: Never true     Ran Out of Food in the Last Year: Never true   Transportation Needs: No Transportation Needs (4/17/2025)    Received from [facility]    PRAPARE - Transportation     Lack of Transportation (Medical): No     Lack of Transportation (Non-Medical): No   Physical Activity: Insufficiently Active (2/20/2025)    Exercise Vital Sign     Days of Exercise per Week: 1 day     Minutes of Exercise per Session: 30 min   Stress: No Stress Concern Present (4/17/2025)    Received from [facility] [employer] [facility] [place]    [employer] - Occupational Stress Questionnaire     Feeling of Stress : Not at all   Housing Stability: Unknown (4/17/2025)    Received from [facility]    Housing Stability Vital Sign     Unable to Pay for Housing in the Last Year: No     Homeless in the Last Year: No         Past Surgical History:   Procedure Laterality Date    BONE BIOPSY N/A          Review of systems:  Review of Systems   Constitutional:  Negative for activity change, appetite change, chills, diaphoresis, fatigue and unexpected weight change.   HENT:  Negative for congestion, facial swelling, hearing loss, mouth sores, trouble swallowing and voice change.    Eyes:  Negative for photophobia, pain, discharge and itching.   Respiratory:  Negative for apnea, cough, choking, chest tightness and shortness of breath.    Cardiovascular:  Negative for chest pain, palpitations and leg swelling.   Gastrointestinal:  Negative for abdominal distention, abdominal pain, anal bleeding and blood in stool.   Endocrine: Negative for cold intolerance, heat intolerance, polydipsia and polyphagia.   Genitourinary:  Negative for difficulty urinating, dysuria, flank pain and hematuria.   Musculoskeletal:  Negative for arthralgias, back pain, joint swelling, myalgias, neck pain and neck stiffness.   Skin:  Negative for color  change, pallor and wound.   Allergic/Immunologic: Negative for environmental allergies, food allergies and immunocompromised state.   Neurological:  Negative for dizziness, seizures, facial asymmetry, speech difficulty, light-headedness, numbness and headaches.   Hematological:  Negative for adenopathy. Does not bruise/bleed easily.   Psychiatric/Behavioral:  Negative for agitation, behavioral problems, confusion, decreased concentration and sleep disturbance.                                Physical Exam  Vitals and nursing note reviewed.   Constitutional:       General: He is not in acute distress.     Appearance: Normal appearance. He is not ill-appearing.   HENT:      Head: Normocephalic and atraumatic.      Nose: No congestion or rhinorrhea.   Eyes:      General: No scleral icterus.     Extraocular Movements: Extraocular movements intact.      Pupils: Pupils are equal, round, and reactive to light.   Cardiovascular:      Rate and Rhythm: Normal rate and regular rhythm.      Pulses: Normal pulses.      Heart sounds: Normal heart sounds. No murmur heard.     No gallop.   Pulmonary:      Effort: Pulmonary effort is normal. No respiratory distress.      Breath sounds: Normal breath sounds. No stridor. No wheezing or rhonchi.   Abdominal:      General: Bowel sounds are normal. There is no distension.      Palpations: There is no mass.      Tenderness: There is no abdominal tenderness. There is no guarding.   Musculoskeletal:         General: No swelling, tenderness, deformity or signs of injury. Normal range of motion.      Cervical back: Normal range of motion and neck supple. No rigidity. No muscular tenderness.      Right lower leg: No edema.      Left lower leg: No edema.   Skin:     General: Skin is warm.      Coloration: Skin is not jaundiced or pale.      Findings: Rash present. No bruising or lesion.   Neurological:      General: No focal deficit present.      Mental Status: He is alert and oriented to  person, place, and time.      Cranial Nerves: No cranial nerve deficit.      Sensory: No sensory deficit.      Motor: No weakness.      Gait: Gait normal.   Psychiatric:         Mood and Affect: Mood normal.         Behavior: Behavior normal.         Thought Content: Thought content normal.     There were no vitals filed for this visit.          There is no height or weight on file to calculate BSA.    Assessment/Plan:      ECOG 1    Multiple myeloma:    == reviewed prior lab work done with Nephrology.  Patient is noted to have worsening renal failure and also has presence of monoclonal band on immuno fixation studies.  I will initiate workup for multiple myeloma and will include a bone marrow biopsy as well as a PET scan to rule out any osteolytic lesions.  Discussed these findings in detail with patient giving him opportunity to ask questions  == 8/18/24:  Bone marrow biopsy done showed increased number of plasma cells.  No percentage mentioned but reported to be more than normal and in the smoldering range.  With evidence of worsening renal failure along with elevated kappa free light chains and kappa/lambda free light chain ratio findings are consistent with active multiple myeloma.  Due to decline in kidney function my initial plan would be to initiate CyBorD.  Patient reports that he is on a job which requires lot of traveling and unable to come to clinics  regularly do receive his treatment.   ==9/23/24:  Patient and wife here today to discuss upcoming chemotherapy/immunotherapy. An extensive discussion was had which included a thorough discussion of potential side effect profile, risk versus benefits, and expected outcomes.  Risks, including but not limited to, possible hair loss, bone marrow damage, damage to body organs, allergic reactions, sterility, nausea/vomiting, constipation/diarrhea, sores in the mouth, secondary cancers, and rarely death were all discuss.  Specific side effects pertaining to their  chemotherapy/immunotherapy medications were discussed as well.  The patient agrees with the plan and all questions and their support systems questions have been answered to their satisfaction.  Premedications were prescribed and patient was educated on appropriate usage.  Patient was educated on when to call, and given the number to call, or to notify the provider including but not limited to:  Persistent nausea and vomiting, dehydration, fever greater than 100.4 lasting over 1 hour induration or isolated feeders greater than 101, rash while on active chemotherapy or immunotherapy, severe pain or new onset pain not controlled by current pain medication regimen.   ==10/04/2024: Tolerating chemotherapy well, pt has leg cramps has had all of his life worse at night, potassium mg normal, discussed medication, pt would like to try otc topicals such as biofreeze or icyhot with lidocaine first and if not helpful will notify clinic and will send our rx.  No other complaints, will continue chemotherapy, plan to see next week and if continues to tolerate well plan to see every other week.  ==10/11/2024: Magnesium wnl, but hypophosphatemia noted, pt denies diarrhea or antacid use.  Will start gentle supplementation with po multivitamin, discussed with patient his renal function and may need po phosphate if repeat labs show continued low phosphorus, but black box warning in CKD for po phosphate.  Will try multivitamin containing phos as well as increasing dietary intake - reviewed dietary sources of phosphorus.  Will also check vitamin D level, Vit D deficiency can cause/worsen hypophosphatemia and recheck phos level to determine if transient.  Pt is asymptomatic at this time has occasional leg cramps for all of his life, no increase or other symptoms.  Labs reviewed will continue chemotherapy   ==11/22/2024: Phos wnl, tolerating Cybor D well, leg cramps improved with baclofen, labs reviewed patient is cleared for chemotherapy.  Will now also make referral to BMT team in [place].   ==12/27/2024: Has appt with Dr. Cardoso 1/16/2025, tolerating CyBorD well.  May use labs from BMT appt on 1/16/25 for chemo scheduled on 1/17/2024, will need cbc cmp. Denies complaints, will continue with CyBorD.  ==02/21/2025: Tolerating CyBorD well, next appt with Dr. Cardoso is on 2/27/2025 for transplant eval. Last myeloma labs were on 1/24/25 so I will have drawn today so they will be resulted for his visit next week. Whole body PET ct reviewed which showed bony degenerative changes without evidence of bony destruction, no hypermetabolic activity seen and no interval changes from prior study.  Will continue CyBorD at this time.   ==03/07/2025: Pt recently had visit with Dr. Meredith with transplant who recommends patient to switch to Dina+VRD and start Xgeva. He has had dental clearance which was sent to [facility] will be scanned in chart.  Plan to send Dina+VRD as well as xgeva for approval and continue CyborD until approved. Will add hep panel and type and screen to next labs in anticipation of starting Dina  ==03/14/2025:  Patient here today to discuss upcoming chemotherapy/immunotherapy. An extensive discussion was had which included a thorough discussion of potential side effect profile, risk versus benefits, and expected outcomes.  Risks, including but not limited to, possible hair loss, bone marrow damage, damage to body organs, allergic reactions, sterility, nausea/vomiting, constipation/diarrhea, sores in the mouth, secondary cancers, and rarely death were all discuss.  Specific side effects pertaining to their chemotherapy/immunotherapy medications were discussed as well.  The patient agrees with the plan and all questions and their support systems questions have been answered to their satisfaction.  Premedications were prescribed and patient was educated on appropriate usage.  Patient was educated on when to call, and given the number to call, or  to notify the provider including but not limited to:  Persistent nausea and vomiting, dehydration, fever greater than 100.4 lasting over 1 hour induration or isolated feeders greater than 101, rash while on active chemotherapy or immunotherapy, severe pain or new onset pain not controlled by current pain medication regimen. Starting Dina-VRD and xgeva today, pt doesn't have revlimid yet, will cont cytoxan until he receives it.  ==03/21/2025: Tolerated first cycle of VRD well, occasional headaches relived with tylenol and caffeine, labs reviewed, pt is cleared for treatment  ==04/04/2025: Sodium 127, pt having dull headaches, encouraged increasing po sodium, reviewed dietary sources of sodium, will start on sodium tablets if does not increase with dietary salt.  Pt has history of chronic hearing loss for years, but has noticed worse over the past few months, will refer to ENT.  Labs reviewed, pt is cleared for treatment, week of Good Friday will see on Thurs due to holiday  ==04/17/2025: Seeing ENT next week for fluid behind ear drum per pcp and tinnitus, stem cell transplant delayed by bmt to August, will continue dina-vrd. Last xgeva was 4/11 will continue  ==05/09/2025: Tolerating chemo well, due for xgeva as well, had virtual visit with bmt and plan to proceed with stem cell transplant in August. Labs reviewed, pt is cleared for xgeva and chemotherapy  ==05/22/2025: Tolerating therapy well, myeloma labs drawn, not resulted yet. Labs reviewed, pt is cleared for chemotherapy  ==05/29/2025: Pt presented to clinic as walk in/prn visit reported rash from medication.  Pt has a few small red bumps to bilateral arms, not urticra, not herpes zoster, will start on daily pepcid and zyrtec or claritin.  Pt is on week off of lenolidamide, received dina last week.  Also has been in sun a lot recently encouraged protective clothing/sunblock. Educated on notifying clinic if worsens. No pruritis    2. Prophylaxis for infection  prevention  == Continue Bactrim and acyclovir    3. Hypophosphatemia  ==Asymptomatic, normal magnesium level - pt has had leg cramps all of life, no increase previous phos levels wnl  ==Recheck to determine if transient, avoid antacids, check vitamin D level  ==encourage po intake, will start multivitamin containing phosphorus, may need higher dose if does not resolve, will prescribe with caution given renal function  == 11/22/24: Resolved      Plan:    05/29/2025:     stop  CyBorD - until start Dina VRD  Continue Dina-VRD  Cbc cmp weekly, myeloma labs q 4 weeks  NP appt in 2 weeks   Continue Bactrim and valtrex prophylaxis  Continue Xgeva - last admin on 5/9/2025        Total time spent in counseling and discussion about further management options including relevant lab work, treatment,  prognosis, medications and intended side effects was more than 30 minutes. More than 50 % of the time was spent in counseling and coordination of care.  This includes face to face time and non-face to face time preparing to see the patient (eg, review of tests), Obtaining and/or reviewing separately obtained history, Documenting clinical information in the electronic or other health record, Independently interpreting resultsand communicating results to the patient/family/caregiver, or Care coordination.

## 2025-06-04 ENCOUNTER — RESULTS FOLLOW-UP (OUTPATIENT)
Dept: GASTROENTEROLOGY | Facility: CLINIC | Age: 55
End: 2025-06-04
Payer: COMMERCIAL

## 2025-06-05 NOTE — TELEPHONE ENCOUNTER
1 TA, repeat colonoscopy in 5 years.     Notify patient his colon polyp was benign. Repeat colonoscopy in 5 years. Confirm recall tab in appointment desk is up-to-date (update if needed).  Send copy of reports and path to [facility]. He should notify me if any issues.  NBP

## 2025-06-06 ENCOUNTER — PATIENT MESSAGE (OUTPATIENT)
Dept: HEMATOLOGY/ONCOLOGY | Facility: CLINIC | Age: 55
End: 2025-06-06
Payer: COMMERCIAL

## 2025-06-06 ENCOUNTER — OFFICE VISIT (OUTPATIENT)
Dept: HEMATOLOGY/ONCOLOGY | Facility: CLINIC | Age: 55
End: 2025-06-06
Payer: COMMERCIAL

## 2025-06-06 VITALS
WEIGHT: 197.5 LBS | DIASTOLIC BLOOD PRESSURE: 72 MMHG | SYSTOLIC BLOOD PRESSURE: 116 MMHG | HEIGHT: 71 IN | HEART RATE: 61 BPM | TEMPERATURE: 98 F | OXYGEN SATURATION: 94 % | BODY MASS INDEX: 27.65 KG/M2 | RESPIRATION RATE: 16 BRPM

## 2025-06-06 DIAGNOSIS — Z76.82 STEM CELL TRANSPLANT CANDIDATE: ICD-10-CM

## 2025-06-06 DIAGNOSIS — D84.81 IMMUNODEFICIENCY DUE TO CONDITIONS CLASSIFIED ELSEWHERE: ICD-10-CM

## 2025-06-06 DIAGNOSIS — C90.00 MULTIPLE MYELOMA NOT HAVING ACHIEVED REMISSION: Primary | ICD-10-CM

## 2025-06-06 DIAGNOSIS — N18.32 STAGE 3B CHRONIC KIDNEY DISEASE: ICD-10-CM

## 2025-06-06 RX ORDER — ESCITALOPRAM OXALATE 10 MG/1
10 TABLET ORAL DAILY
Qty: 30 TABLET | Refills: 0 | Status: SHIPPED | OUTPATIENT
Start: 2025-06-06

## 2025-06-10 NOTE — TELEPHONE ENCOUNTER
Patient's wife was notified of results and voiced understanding. Recall tab is up-to-date. Procedure reports and path report faxed to Dr. Tereso Cardoso at [phone]. DMP

## 2025-06-18 ENCOUNTER — TELEPHONE (OUTPATIENT)
Dept: HEMATOLOGY/ONCOLOGY | Facility: CLINIC | Age: 55
End: 2025-06-18
Payer: COMMERCIAL

## 2025-06-18 DIAGNOSIS — C90.00 MULTIPLE MYELOMA NOT HAVING ACHIEVED REMISSION: Primary | ICD-10-CM

## 2025-06-18 RX ORDER — LENALIDOMIDE 25 MG/1
CAPSULE ORAL
Qty: 21 CAPSULE | Refills: 0 | Status: SHIPPED | OUTPATIENT
Start: 2025-06-18

## 2025-06-18 NOTE — TELEPHONE ENCOUNTER
Copied from CRM #[id number]. Topic: Medications - Medication Authorization  >> Jun 18, 2025 12:36 PM Shantell wrote:  Type:  RX Refill Request    Who Called: soo  Refill or New Rx:refill  RX Name and Strength:revlimid/lenalidomide 25 mg  How is the patient currently taking it? (ex. 1XDay):daily  Is this a 30 day or 90 day RX:30  Preferred Pharmacy with phone number:    [address] [address]  Phone: [phone] ref#[id number]   Fax: [phone]    Local or Mail Order:local  Ordering Provider:ferdinand  Would the patient rather a call back or a response via [facility]?   Best Call Back Number:[phone]  Additional Information: missing auth#

## 2025-06-20 ENCOUNTER — TELEPHONE (OUTPATIENT)
Dept: HEMATOLOGY/ONCOLOGY | Facility: CLINIC | Age: 55
End: 2025-06-20

## 2025-06-20 ENCOUNTER — OFFICE VISIT (OUTPATIENT)
Dept: HEMATOLOGY/ONCOLOGY | Facility: CLINIC | Age: 55
End: 2025-06-20
Payer: COMMERCIAL

## 2025-06-20 VITALS
DIASTOLIC BLOOD PRESSURE: 71 MMHG | WEIGHT: 196.69 LBS | TEMPERATURE: 99 F | BODY MASS INDEX: 27.54 KG/M2 | RESPIRATION RATE: 16 BRPM | HEIGHT: 71 IN | HEART RATE: 61 BPM | OXYGEN SATURATION: 96 % | SYSTOLIC BLOOD PRESSURE: 110 MMHG

## 2025-06-20 DIAGNOSIS — D84.81 IMMUNODEFICIENCY DUE TO CONDITIONS CLASSIFIED ELSEWHERE: ICD-10-CM

## 2025-06-20 DIAGNOSIS — N18.32 STAGE 3B CHRONIC KIDNEY DISEASE: ICD-10-CM

## 2025-06-20 DIAGNOSIS — C90.00 MULTIPLE MYELOMA NOT HAVING ACHIEVED REMISSION: Primary | ICD-10-CM

## 2025-06-20 DIAGNOSIS — Z76.82 STEM CELL TRANSPLANT CANDIDATE: ICD-10-CM

## 2025-06-20 RX ORDER — ACETAMINOPHEN 325 MG/1
650 TABLET ORAL
OUTPATIENT
Start: 2025-06-20

## 2025-06-20 RX ORDER — BORTEZOMIB 3.5 MG/1
1.3 INJECTION, POWDER, LYOPHILIZED, FOR SOLUTION INTRAVENOUS; SUBCUTANEOUS
OUTPATIENT
Start: 2025-07-11

## 2025-06-20 RX ORDER — HEPARIN 100 UNIT/ML
500 SYRINGE INTRAVENOUS
OUTPATIENT
Start: 2025-06-27

## 2025-06-20 RX ORDER — PROCHLORPERAZINE EDISYLATE 5 MG/ML
10 INJECTION INTRAMUSCULAR; INTRAVENOUS ONCE AS NEEDED
OUTPATIENT
Start: 2025-07-04

## 2025-06-20 RX ORDER — PROCHLORPERAZINE EDISYLATE 5 MG/ML
10 INJECTION INTRAMUSCULAR; INTRAVENOUS ONCE AS NEEDED
OUTPATIENT
Start: 2025-07-11

## 2025-06-20 RX ORDER — SODIUM CHLORIDE 0.9 % (FLUSH) 0.9 %
10 SYRINGE (ML) INJECTION
OUTPATIENT
Start: 2025-07-11

## 2025-06-20 RX ORDER — BORTEZOMIB 3.5 MG/1
1.3 INJECTION, POWDER, LYOPHILIZED, FOR SOLUTION INTRAVENOUS; SUBCUTANEOUS
OUTPATIENT
Start: 2025-06-27

## 2025-06-20 RX ORDER — EPINEPHRINE 0.3 MG/.3ML
0.3 INJECTION SUBCUTANEOUS ONCE AS NEEDED
OUTPATIENT
Start: 2025-06-27

## 2025-06-20 RX ORDER — DIPHENHYDRAMINE HYDROCHLORIDE 50 MG/ML
50 INJECTION, SOLUTION INTRAMUSCULAR; INTRAVENOUS ONCE AS NEEDED
OUTPATIENT
Start: 2025-06-20

## 2025-06-20 RX ORDER — DIPHENHYDRAMINE HYDROCHLORIDE 50 MG/ML
50 INJECTION, SOLUTION INTRAMUSCULAR; INTRAVENOUS ONCE AS NEEDED
OUTPATIENT
Start: 2025-07-04

## 2025-06-20 RX ORDER — EPINEPHRINE 0.3 MG/.3ML
0.3 INJECTION SUBCUTANEOUS ONCE AS NEEDED
OUTPATIENT
Start: 2025-06-20

## 2025-06-20 RX ORDER — BORTEZOMIB 3.5 MG/1
1.3 INJECTION, POWDER, LYOPHILIZED, FOR SOLUTION INTRAVENOUS; SUBCUTANEOUS
OUTPATIENT
Start: 2025-07-04

## 2025-06-20 RX ORDER — EPINEPHRINE 0.3 MG/.3ML
0.3 INJECTION SUBCUTANEOUS ONCE AS NEEDED
OUTPATIENT
Start: 2025-07-11

## 2025-06-20 RX ORDER — HEPARIN 100 UNIT/ML
500 SYRINGE INTRAVENOUS
OUTPATIENT
Start: 2025-06-20

## 2025-06-20 RX ORDER — DIPHENHYDRAMINE HCL 25 MG
25 CAPSULE ORAL
OUTPATIENT
Start: 2025-07-04

## 2025-06-20 RX ORDER — DIPHENHYDRAMINE HCL 25 MG
25 CAPSULE ORAL
OUTPATIENT
Start: 2025-07-11

## 2025-06-20 RX ORDER — ACETAMINOPHEN 325 MG/1
650 TABLET ORAL
OUTPATIENT
Start: 2025-07-11

## 2025-06-20 RX ORDER — DIPHENHYDRAMINE HYDROCHLORIDE 50 MG/ML
50 INJECTION, SOLUTION INTRAMUSCULAR; INTRAVENOUS ONCE AS NEEDED
OUTPATIENT
Start: 2025-06-27

## 2025-06-20 RX ORDER — ACETAMINOPHEN 325 MG/1
650 TABLET ORAL
OUTPATIENT
Start: 2025-07-04

## 2025-06-20 RX ORDER — PROCHLORPERAZINE EDISYLATE 5 MG/ML
10 INJECTION INTRAMUSCULAR; INTRAVENOUS ONCE AS NEEDED
OUTPATIENT
Start: 2025-06-20

## 2025-06-20 RX ORDER — HEPARIN 100 UNIT/ML
500 SYRINGE INTRAVENOUS
OUTPATIENT
Start: 2025-07-04

## 2025-06-20 RX ORDER — ACETAMINOPHEN 325 MG/1
650 TABLET ORAL
OUTPATIENT
Start: 2025-06-27

## 2025-06-20 RX ORDER — PROCHLORPERAZINE EDISYLATE 5 MG/ML
10 INJECTION INTRAMUSCULAR; INTRAVENOUS ONCE AS NEEDED
OUTPATIENT
Start: 2025-06-27

## 2025-06-20 RX ORDER — SODIUM CHLORIDE 0.9 % (FLUSH) 0.9 %
10 SYRINGE (ML) INJECTION
OUTPATIENT
Start: 2025-06-20

## 2025-06-20 RX ORDER — SODIUM CHLORIDE 0.9 % (FLUSH) 0.9 %
10 SYRINGE (ML) INJECTION
OUTPATIENT
Start: 2025-07-04

## 2025-06-20 RX ORDER — HEPARIN 100 UNIT/ML
500 SYRINGE INTRAVENOUS
OUTPATIENT
Start: 2025-07-11

## 2025-06-20 RX ORDER — EPINEPHRINE 0.3 MG/.3ML
0.3 INJECTION SUBCUTANEOUS ONCE AS NEEDED
OUTPATIENT
Start: 2025-07-04

## 2025-06-20 RX ORDER — DIPHENHYDRAMINE HYDROCHLORIDE 50 MG/ML
50 INJECTION, SOLUTION INTRAMUSCULAR; INTRAVENOUS ONCE AS NEEDED
OUTPATIENT
Start: 2025-07-11

## 2025-06-20 RX ORDER — BORTEZOMIB 3.5 MG/1
1.3 INJECTION, POWDER, LYOPHILIZED, FOR SOLUTION INTRAVENOUS; SUBCUTANEOUS
OUTPATIENT
Start: 2025-06-20

## 2025-06-20 RX ORDER — SODIUM CHLORIDE 0.9 % (FLUSH) 0.9 %
10 SYRINGE (ML) INJECTION
OUTPATIENT
Start: 2025-06-27

## 2025-06-20 RX ORDER — DIPHENHYDRAMINE HCL 25 MG
25 CAPSULE ORAL
OUTPATIENT
Start: 2025-06-27

## 2025-06-20 RX ORDER — DIPHENHYDRAMINE HCL 25 MG
25 CAPSULE ORAL
OUTPATIENT
Start: 2025-06-20

## 2025-06-20 NOTE — PROGRESS NOTES
HEMATOLOGY FOLLOW UP CONSULTATION NOTE    Patient ID: Miky Carrasco is a 54 y.o. male.    Chief Complaint:  Multiple myeloma    HPI:  Patient is a 53-year-old male with past medical history of hyperlipidemia who recently was evaluated by Nephrology for worsening kidney function and underwent further lab work including serum protein electrophoresis which showed findings concerning for possible MGUS versus active multiple myeloma with decline in kidney.  Patient presents to our clinic today for further evaluation.  Voices no acute complaints. Denies bone pain, activity and appetite changes.              Past Medical History:   Diagnosis Date    CKD (chronic kidney disease)     History of colonic polyps     Multiple myeloma not having achieved remission     TMJ (dislocation of temporomandibular joint)        Family History   Problem Relation Name Age of Onset    Hypertension Mother      Diabetes Mother      Skin cancer Mother      Hypertension Father      Lymphoma Sister      Ulcerative colitis Neg Hx      Crohn's disease Neg Hx      Pancreatic cancer Neg Hx      Liver disease Neg Hx      Stomach cancer Neg Hx      Colon cancer Neg Hx      Throat cancer Neg Hx      Esophageal cancer Neg Hx         Social History     Socioeconomic History    Marital status:    Tobacco Use    Smoking status: Never     Passive exposure: Current    Smokeless tobacco: Former     Types: Chew   Substance and Sexual Activity    Alcohol use: Not Currently    Drug use: Not Currently     Types: Benzodiazepines    Sexual activity: Yes     Social Drivers of Health     Financial Resource Strain: Low Risk  (4/17/2025)    Received from [facility]    Overall Financial Resource Strain (CARDIA)     Difficulty of Paying Living Expenses: Not hard at all   Food Insecurity: No Food Insecurity (4/17/2025)    Received from [facility]    Hunger Vital Sign     Worried About Running Out of Food in the  Last Year: Never true     Ran Out of Food in the Last Year: Never true   Transportation Needs: No Transportation Needs (4/17/2025)    Received from [facility]    PRAPARE - Transportation     Lack of Transportation (Medical): No     Lack of Transportation (Non-Medical): No   Physical Activity: Insufficiently Active (2/20/2025)    Exercise Vital Sign     Days of Exercise per Week: 1 day     Minutes of Exercise per Session: 30 min   Stress: No Stress Concern Present (4/17/2025)    Received from [facility]    [employer] - Occupational Stress Questionnaire     Feeling of Stress : Not at all   Housing Stability: Unknown (4/17/2025)    Received from [facility]    Housing Stability Vital Sign     Unable to Pay for Housing in the Last Year: No     Homeless in the Last Year: No         Past Surgical History:   Procedure Laterality Date    BONE BIOPSY N/A          Review of systems:  Review of Systems   Constitutional:  Negative for activity change, appetite change, chills, diaphoresis, fatigue and unexpected weight change.   HENT:  Negative for congestion, facial swelling, hearing loss, mouth sores, trouble swallowing and voice change.    Eyes:  Negative for photophobia, pain, discharge and itching.   Respiratory:  Negative for apnea, cough, choking, chest tightness and shortness of breath.    Cardiovascular:  Negative for chest pain, palpitations and leg swelling.   Gastrointestinal:  Negative for abdominal distention, abdominal pain, anal bleeding and blood in stool.   Endocrine: Negative for cold intolerance, heat intolerance, polydipsia and polyphagia.   Genitourinary:  Negative for difficulty urinating, dysuria, flank pain and hematuria.   Musculoskeletal:  Negative for arthralgias, back pain, joint swelling, myalgias, neck pain and neck stiffness.   Skin:  Negative for color change, pallor and wound.    Allergic/Immunologic: Negative for environmental allergies, food allergies and immunocompromised state.   Neurological:  Negative for dizziness, seizures, facial asymmetry, speech difficulty, light-headedness, numbness and headaches.   Hematological:  Negative for adenopathy. Does not bruise/bleed easily.   Psychiatric/Behavioral:  Negative for agitation, behavioral problems, confusion, decreased concentration and sleep disturbance.                                Physical Exam  Vitals and nursing note reviewed.   Constitutional:       General: He is not in acute distress.     Appearance: Normal appearance. He is not ill-appearing.   HENT:      Head: Normocephalic and atraumatic.      Nose: No congestion or rhinorrhea.   Eyes:      General: No scleral icterus.     Extraocular Movements: Extraocular movements intact.      Pupils: Pupils are equal, round, and reactive to light.   Cardiovascular:      Rate and Rhythm: Normal rate and regular rhythm.      Pulses: Normal pulses.      Heart sounds: Normal heart sounds. No murmur heard.     No gallop.   Pulmonary:      Effort: Pulmonary effort is normal. No respiratory distress.      Breath sounds: Normal breath sounds. No stridor. No wheezing or rhonchi.   Abdominal:      General: Bowel sounds are normal. There is no distension.      Palpations: There is no mass.      Tenderness: There is no abdominal tenderness. There is no guarding.   Musculoskeletal:         General: No swelling, tenderness, deformity or signs of injury. Normal range of motion.      Cervical back: Normal range of motion and neck supple. No rigidity. No muscular tenderness.      Right lower leg: No edema.      Left lower leg: No edema.   Skin:     General: Skin is warm.      Coloration: Skin is not jaundiced or pale.      Findings: Rash present. No bruising or lesion.   Neurological:      General: No focal deficit present.      Mental Status: He is alert and oriented to person, place, and time.       Cranial Nerves: No cranial nerve deficit.      Sensory: No sensory deficit.      Motor: No weakness.      Gait: Gait normal.   Psychiatric:         Mood and Affect: Mood normal.         Behavior: Behavior normal.         Thought Content: Thought content normal.     There were no vitals filed for this visit.          There is no height or weight on file to calculate BSA.    Assessment/Plan:      ECOG 1    Multiple myeloma:    == reviewed prior lab work done with Nephrology.  Patient is noted to have worsening renal failure and also has presence of monoclonal band on immuno fixation studies.  I will initiate workup for multiple myeloma and will include a bone marrow biopsy as well as a PET scan to rule out any osteolytic lesions.  Discussed these findings in detail with patient giving him opportunity to ask questions  == 8/18/24:  Bone marrow biopsy done showed increased number of plasma cells.  No percentage mentioned but reported to be more than normal and in the smoldering range.  With evidence of worsening renal failure along with elevated kappa free light chains and kappa/lambda free light chain ratio findings are consistent with active multiple myeloma.  Due to decline in kidney function my initial plan would be to initiate CyBorD.  Patient reports that he is on a job which requires lot of traveling and unable to come to clinics  regularly do receive his treatment.   ==9/23/24:  Patient and wife here today to discuss upcoming chemotherapy/immunotherapy. An extensive discussion was had which included a thorough discussion of potential side effect profile, risk versus benefits, and expected outcomes.  Risks, including but not limited to, possible hair loss, bone marrow damage, damage to body organs, allergic reactions, sterility, nausea/vomiting, constipation/diarrhea, sores in the mouth, secondary cancers, and rarely death were all discuss.  Specific side effects pertaining to their chemotherapy/immunotherapy  medications were discussed as well.  The patient agrees with the plan and all questions and their support systems questions have been answered to their satisfaction.  Premedications were prescribed and patient was educated on appropriate usage.  Patient was educated on when to call, and given the number to call, or to notify the provider including but not limited to:  Persistent nausea and vomiting, dehydration, fever greater than 100.4 lasting over 1 hour induration or isolated feeders greater than 101, rash while on active chemotherapy or immunotherapy, severe pain or new onset pain not controlled by current pain medication regimen.   ==10/04/2024: Tolerating chemotherapy well, pt has leg cramps has had all of his life worse at night, potassium mg normal, discussed medication, pt would like to try otc topicals such as biofreeze or icyhot with lidocaine first and if not helpful will notify clinic and will send our rx.  No other complaints, will continue chemotherapy, plan to see next week and if continues to tolerate well plan to see every other week.  ==10/11/2024: Magnesium wnl, but hypophosphatemia noted, pt denies diarrhea or antacid use.  Will start gentle supplementation with po multivitamin, discussed with patient his renal function and may need po phosphate if repeat labs show continued low phosphorus, but black box warning in CKD for po phosphate.  Will try multivitamin containing phos as well as increasing dietary intake - reviewed dietary sources of phosphorus.  Will also check vitamin D level, Vit D deficiency can cause/worsen hypophosphatemia and recheck phos level to determine if transient.  Pt is asymptomatic at this time has occasional leg cramps for all of his life, no increase or other symptoms.  Labs reviewed will continue chemotherapy   ==11/22/2024: Phos wnl, tolerating Cybor D well, leg cramps improved with baclofen, labs reviewed patient is cleared for chemotherapy. Will now also make  referral to BMT team in [place].   ==12/27/2024: Has appt with Dr. Cardoso 1/16/2025, tolerating CyBorD well.  May use labs from BMT appt on 1/16/25 for chemo scheduled on 1/17/2024, will need cbc cmp. Denies complaints, will continue with CyBorD.  ==02/21/2025: Tolerating CyBorD well, next appt with Dr. Cardoso is on 2/27/2025 for transplant eval. Last myeloma labs were on 1/24/25 so I will have drawn today so they will be resulted for his visit next week. Whole body PET ct reviewed which showed bony degenerative changes without evidence of bony destruction, no hypermetabolic activity seen and no interval changes from prior study.  Will continue CyBorD at this time.   ==03/07/2025: Pt recently had visit with Dr. Meredith with transplant who recommends patient to switch to Dina+VRD and start Xgeva. He has had dental clearance which was sent to [facility] will be scanned in chart.  Plan to send Dina+VRD as well as xgeva for approval and continue CyborD until approved. Will add hep panel and type and screen to next labs in anticipation of starting Dina  ==03/14/2025:  Patient here today to discuss upcoming chemotherapy/immunotherapy. An extensive discussion was had which included a thorough discussion of potential side effect profile, risk versus benefits, and expected outcomes.  Risks, including but not limited to, possible hair loss, bone marrow damage, damage to body organs, allergic reactions, sterility, nausea/vomiting, constipation/diarrhea, sores in the mouth, secondary cancers, and rarely death were all discuss.  Specific side effects pertaining to their chemotherapy/immunotherapy medications were discussed as well.  The patient agrees with the plan and all questions and their support systems questions have been answered to their satisfaction.  Premedications were prescribed and patient was educated on appropriate usage.  Patient was educated on when to call, and given the number to call, or to notify the  provider including but not limited to:  Persistent nausea and vomiting, dehydration, fever greater than 100.4 lasting over 1 hour induration or isolated feeders greater than 101, rash while on active chemotherapy or immunotherapy, severe pain or new onset pain not controlled by current pain medication regimen. Starting Dina-VRD and xgeva today, pt doesn't have revlimid yet, will cont cytoxan until he receives it.  ==03/21/2025: Tolerated first cycle of VRD well, occasional headaches relived with tylenol and caffeine, labs reviewed, pt is cleared for treatment  ==04/04/2025: Sodium 127, pt having dull headaches, encouraged increasing po sodium, reviewed dietary sources of sodium, will start on sodium tablets if does not increase with dietary salt.  Pt has history of chronic hearing loss for years, but has noticed worse over the past few months, will refer to ENT.  Labs reviewed, pt is cleared for treatment, week of Good Friday will see on Thurs due to holiday  ==04/17/2025: Seeing ENT next week for fluid behind ear drum per pcp and tinnitus, stem cell transplant delayed by bmt to August, will continue dina-vrd. Last xgeva was 4/11 will continue  ==05/09/2025: Tolerating chemo well, due for xgeva as well, had virtual visit with bmt and plan to proceed with stem cell transplant in August. Labs reviewed, pt is cleared for xgeva and chemotherapy  ==05/22/2025: Tolerating therapy well, myeloma labs drawn, not resulted yet. Labs reviewed, pt is cleared for chemotherapy  ==05/29/2025: Pt presented to clinic as walk in/prn visit reported rash from medication.  Pt has a few small red bumps to bilateral arms, not urticra, not herpes zoster, will start on daily pepcid and zyrtec or claritin.  Pt is on week off of lenolidamide, received dina last week.  Also has been in sun a lot recently encouraged protective clothing/sunblock. Educated on notifying clinic if worsens. No pruritus  ==06/06/2025: Due for Xgeva, Velcade and  Daratumamab, rash to arms and back improved, is mild, related to Revlimid, pt is taking Zyrtec prn, will start taking daily. No other complaints, will continue current treatment  ==06/20/2025: FLC ratio 20.17, kappa 58.5, lambda 2.9, scanned to chart in media, labs reviewed, pt cleared for treatment    2. Prophylaxis for infection prevention  == Continue Bactrim and acyclovir    3. Hypophosphatemia  ==Asymptomatic, normal magnesium level - pt has had leg cramps all of life, no increase previous phos levels wnl  ==Recheck to determine if transient, avoid antacids, check vitamin D level  ==encourage po intake, will start multivitamin containing phosphorus, may need higher dose if does not resolve, will prescribe with caution given renal function  == 11/22/24: Resolved      Plan:    05/29/2025:     stop  CyBorD - until start Dina VRD  Continue Dina-VRD  Cbc cmp weekly, myeloma labs q 4 weeks  NP appt in 2 weeks   Continue Bactrim and valtrex prophylaxis  Continue Xgeva - due 7/3/2025 (due on 7/4, but closed for July 4th holiday)        Total time spent in counseling and discussion about further management options including relevant lab work, treatment,  prognosis, medications and intended side effects was more than 30 minutes. More than 50 % of the time was spent in counseling and coordination of care.  This includes face to face time and non-face to face time preparing to see the patient (eg, review of tests), Obtaining and/or reviewing separately obtained history, Documenting clinical information in the electronic or other health record, Independently interpreting resultsand communicating results to the patient/family/caregiver, or Care coordination.

## 2025-07-03 ENCOUNTER — OFFICE VISIT (OUTPATIENT)
Dept: HEMATOLOGY/ONCOLOGY | Facility: CLINIC | Age: 55
End: 2025-07-03
Payer: COMMERCIAL

## 2025-07-03 VITALS
DIASTOLIC BLOOD PRESSURE: 76 MMHG | HEART RATE: 60 BPM | RESPIRATION RATE: 16 BRPM | OXYGEN SATURATION: 97 % | BODY MASS INDEX: 27.58 KG/M2 | SYSTOLIC BLOOD PRESSURE: 119 MMHG | TEMPERATURE: 98 F | WEIGHT: 197 LBS | HEIGHT: 71 IN

## 2025-07-03 DIAGNOSIS — C90.00 MULTIPLE MYELOMA NOT HAVING ACHIEVED REMISSION: Primary | ICD-10-CM

## 2025-07-03 DIAGNOSIS — D84.81 IMMUNODEFICIENCY DUE TO CONDITIONS CLASSIFIED ELSEWHERE: ICD-10-CM

## 2025-07-03 DIAGNOSIS — N18.32 STAGE 3B CHRONIC KIDNEY DISEASE: ICD-10-CM

## 2025-07-03 DIAGNOSIS — Z76.82 STEM CELL TRANSPLANT CANDIDATE: ICD-10-CM

## 2025-07-03 PROCEDURE — 99215 OFFICE O/P EST HI 40 MIN: CPT | Mod: S$PBB,,, | Performed by: NURSE PRACTITIONER

## 2025-07-03 PROCEDURE — G2211 COMPLEX E/M VISIT ADD ON: HCPCS | Mod: ,,, | Performed by: NURSE PRACTITIONER

## 2025-07-03 PROCEDURE — 1159F MED LIST DOCD IN RCRD: CPT | Mod: CPTII,,, | Performed by: NURSE PRACTITIONER

## 2025-07-03 PROCEDURE — 3074F SYST BP LT 130 MM HG: CPT | Mod: CPTII,,, | Performed by: NURSE PRACTITIONER

## 2025-07-03 PROCEDURE — 3008F BODY MASS INDEX DOCD: CPT | Mod: CPTII,,, | Performed by: NURSE PRACTITIONER

## 2025-07-03 PROCEDURE — 3078F DIAST BP <80 MM HG: CPT | Mod: CPTII,,, | Performed by: NURSE PRACTITIONER

## 2025-07-03 NOTE — PROGRESS NOTES
HEMATOLOGY FOLLOW UP CONSULTATION NOTE    Patient ID: Miky Carrasco is a 54 y.o. male.    Chief Complaint:  Multiple myeloma    HPI:  Patient is a 53-year-old male with past medical history of hyperlipidemia who recently was evaluated by Nephrology for worsening kidney function and underwent further lab work including serum protein electrophoresis which showed findings concerning for possible MGUS versus active multiple myeloma with decline in kidney.  Patient presents to our clinic today for further evaluation.  Voices no acute complaints. Denies bone pain, activity and appetite changes.              Past Medical History:   Diagnosis Date    CKD (chronic kidney disease)     History of colonic polyps     Multiple myeloma not having achieved remission     TMJ (dislocation of temporomandibular joint)        Family History   Problem Relation Name Age of Onset    Hypertension Mother      Diabetes Mother      Skin cancer Mother      Hypertension Father      Lymphoma Sister      Ulcerative colitis Neg Hx      Crohn's disease Neg Hx      Pancreatic cancer Neg Hx      Liver disease Neg Hx      Stomach cancer Neg Hx      Colon cancer Neg Hx      Throat cancer Neg Hx      Esophageal cancer Neg Hx         Social History     Socioeconomic History    Marital status:    Tobacco Use    Smoking status: Never     Passive exposure: Current    Smokeless tobacco: Former     Types: Chew   Substance and Sexual Activity    Alcohol use: Not Currently    Drug use: Not Currently     Types: Benzodiazepines    Sexual activity: Yes     Social Drivers of Health     Financial Resource Strain: Low Risk  (4/17/2025)    Received from [facility]    Overall Financial Resource Strain (CARDIA)     Difficulty of Paying Living Expenses: Not hard at all   Food Insecurity: No Food Insecurity (4/17/2025)    Received from [facility]    Hunger Vital Sign     Worried About Running Out of Food in the  Last Year: Never true     Ran Out of Food in the Last Year: Never true   Transportation Needs: No Transportation Needs (4/17/2025)    Received from [facility]    PRAPARE - Transportation     Lack of Transportation (Medical): No     Lack of Transportation (Non-Medical): No   Physical Activity: Insufficiently Active (2/20/2025)    Exercise Vital Sign     Days of Exercise per Week: 1 day     Minutes of Exercise per Session: 30 min   Stress: No Stress Concern Present (4/17/2025)    Received from [facility]    [place] [place] [employer] - Occupational Stress Questionnaire     Feeling of Stress : Not at all   Housing Stability: Unknown (4/17/2025)    Received from [facility]    Housing Stability Vital Sign     Unable to Pay for Housing in the Last Year: No     Homeless in the Last Year: No         Past Surgical History:   Procedure Laterality Date    BONE BIOPSY N/A          Review of systems:  Review of Systems   Constitutional:  Negative for activity change, appetite change, chills, diaphoresis, fatigue and unexpected weight change.   HENT:  Negative for congestion, facial swelling, hearing loss, mouth sores, trouble swallowing and voice change.    Eyes:  Negative for photophobia, pain, discharge and itching.   Respiratory:  Negative for apnea, cough, choking, chest tightness and shortness of breath.    Cardiovascular:  Negative for chest pain, palpitations and leg swelling.   Gastrointestinal:  Negative for abdominal distention, abdominal pain, anal bleeding and blood in stool.   Endocrine: Negative for cold intolerance, heat intolerance, polydipsia and polyphagia.   Genitourinary:  Negative for difficulty urinating, dysuria, flank pain and hematuria.   Musculoskeletal:  Negative for arthralgias, back pain, joint swelling, myalgias, neck pain and neck stiffness.   Skin:  Negative for color change, pallor and wound.    Allergic/Immunologic: Negative for environmental allergies, food allergies and immunocompromised state.   Neurological:  Negative for dizziness, seizures, facial asymmetry, speech difficulty, light-headedness, numbness and headaches.   Hematological:  Negative for adenopathy. Does not bruise/bleed easily.   Psychiatric/Behavioral:  Negative for agitation, behavioral problems, confusion, decreased concentration and sleep disturbance.                                Physical Exam  Vitals and nursing note reviewed.   Constitutional:       General: He is not in acute distress.     Appearance: Normal appearance. He is not ill-appearing.   HENT:      Head: Normocephalic and atraumatic.      Nose: No congestion or rhinorrhea.   Eyes:      General: No scleral icterus.     Extraocular Movements: Extraocular movements intact.      Pupils: Pupils are equal, round, and reactive to light.   Cardiovascular:      Rate and Rhythm: Normal rate and regular rhythm.      Pulses: Normal pulses.      Heart sounds: Normal heart sounds. No murmur heard.     No gallop.   Pulmonary:      Effort: Pulmonary effort is normal. No respiratory distress.      Breath sounds: Normal breath sounds. No stridor. No wheezing or rhonchi.   Abdominal:      General: Bowel sounds are normal. There is no distension.      Palpations: There is no mass.      Tenderness: There is no abdominal tenderness. There is no guarding.   Musculoskeletal:         General: No swelling, tenderness, deformity or signs of injury. Normal range of motion.      Cervical back: Normal range of motion and neck supple. No rigidity. No muscular tenderness.      Right lower leg: No edema.      Left lower leg: No edema.   Skin:     General: Skin is warm.      Coloration: Skin is not jaundiced or pale.      Findings: Rash present. No bruising or lesion.   Neurological:      General: No focal deficit present.      Mental Status: He is alert and oriented to person, place, and time.       Cranial Nerves: No cranial nerve deficit.      Sensory: No sensory deficit.      Motor: No weakness.      Gait: Gait normal.   Psychiatric:         Mood and Affect: Mood normal.         Behavior: Behavior normal.         Thought Content: Thought content normal.     Vitals:    07/03/25 0840   BP: 119/76   Pulse: 60   Resp: 16   Temp: 98.4 °F (36.9 °C)             Body surface area is 2.12 meters squared.    Assessment/Plan:      ECOG 1    Multiple myeloma:    == reviewed prior lab work done with Nephrology.  Patient is noted to have worsening renal failure and also has presence of monoclonal band on immuno fixation studies.  I will initiate workup for multiple myeloma and will include a bone marrow biopsy as well as a PET scan to rule out any osteolytic lesions.  Discussed these findings in detail with patient giving him opportunity to ask questions  == 8/18/24:  Bone marrow biopsy done showed increased number of plasma cells.  No percentage mentioned but reported to be more than normal and in the smoldering range.  With evidence of worsening renal failure along with elevated kappa free light chains and kappa/lambda free light chain ratio findings are consistent with active multiple myeloma.  Due to decline in kidney function my initial plan would be to initiate CyBorD.  Patient reports that he is on a job which requires lot of traveling and unable to come to clinics  regularly do receive his treatment.   ==9/23/24:  Patient and wife here today to discuss upcoming chemotherapy/immunotherapy. An extensive discussion was had which included a thorough discussion of potential side effect profile, risk versus benefits, and expected outcomes.  Risks, including but not limited to, possible hair loss, bone marrow damage, damage to body organs, allergic reactions, sterility, nausea/vomiting, constipation/diarrhea, sores in the mouth, secondary cancers, and rarely death were all discuss.  Specific side effects pertaining to  their chemotherapy/immunotherapy medications were discussed as well.  The patient agrees with the plan and all questions and their support systems questions have been answered to their satisfaction.  Premedications were prescribed and patient was educated on appropriate usage.  Patient was educated on when to call, and given the number to call, or to notify the provider including but not limited to:  Persistent nausea and vomiting, dehydration, fever greater than 100.4 lasting over 1 hour induration or isolated feeders greater than 101, rash while on active chemotherapy or immunotherapy, severe pain or new onset pain not controlled by current pain medication regimen.   ==10/04/2024: Tolerating chemotherapy well, pt has leg cramps has had all of his life worse at night, potassium mg normal, discussed medication, pt would like to try otc topicals such as biofreeze or icyhot with lidocaine first and if not helpful will notify clinic and will send our rx.  No other complaints, will continue chemotherapy, plan to see next week and if continues to tolerate well plan to see every other week.  ==10/11/2024: Magnesium wnl, but hypophosphatemia noted, pt denies diarrhea or antacid use.  Will start gentle supplementation with po multivitamin, discussed with patient his renal function and may need po phosphate if repeat labs show continued low phosphorus, but black box warning in CKD for po phosphate.  Will try multivitamin containing phos as well as increasing dietary intake - reviewed dietary sources of phosphorus.  Will also check vitamin D level, Vit D deficiency can cause/worsen hypophosphatemia and recheck phos level to determine if transient.  Pt is asymptomatic at this time has occasional leg cramps for all of his life, no increase or other symptoms.  Labs reviewed will continue chemotherapy   ==11/22/2024: Phos wnl, tolerating Cybor D well, leg cramps improved with baclofen, labs reviewed patient is cleared for  chemotherapy. Will now also make referral to BMT team in [place].   ==12/27/2024: Has appt with Dr. Cardoso 1/16/2025, tolerating CyBorD well.  May use labs from BMT appt on 1/16/25 for chemo scheduled on 1/17/2024, will need cbc cmp. Denies complaints, will continue with CyBorD.  ==02/21/2025: Tolerating CyBorD well, next appt with Dr. Cardoso is on 2/27/2025 for transplant eval. Last myeloma labs were on 1/24/25 so I will have drawn today so they will be resulted for his visit next week. Whole body PET ct reviewed which showed bony degenerative changes without evidence of bony destruction, no hypermetabolic activity seen and no interval changes from prior study.  Will continue CyBorD at this time.   ==03/07/2025: Pt recently had visit with Dr. Meredith with transplant who recommends patient to switch to Dina+VRD and start Xgeva. He has had dental clearance which was sent to [facility] will be scanned in chart.  Plan to send Dina+VRD as well as xgeva for approval and continue CyborD until approved. Will add hep panel and type and screen to next labs in anticipation of starting Dina  ==03/14/2025:  Patient here today to discuss upcoming chemotherapy/immunotherapy. An extensive discussion was had which included a thorough discussion of potential side effect profile, risk versus benefits, and expected outcomes.  Risks, including but not limited to, possible hair loss, bone marrow damage, damage to body organs, allergic reactions, sterility, nausea/vomiting, constipation/diarrhea, sores in the mouth, secondary cancers, and rarely death were all discuss.  Specific side effects pertaining to their chemotherapy/immunotherapy medications were discussed as well.  The patient agrees with the plan and all questions and their support systems questions have been answered to their satisfaction.  Premedications were prescribed and patient was educated on appropriate usage.  Patient was educated on when to call, and given the number  to call, or to notify the provider including but not limited to:  Persistent nausea and vomiting, dehydration, fever greater than 100.4 lasting over 1 hour induration or isolated feeders greater than 101, rash while on active chemotherapy or immunotherapy, severe pain or new onset pain not controlled by current pain medication regimen. Starting Dina-VRD and xgeva today, pt doesn't have revlimid yet, will cont cytoxan until he receives it.  ==03/21/2025: Tolerated first cycle of VRD well, occasional headaches relived with tylenol and caffeine, labs reviewed, pt is cleared for treatment  ==04/04/2025: Sodium 127, pt having dull headaches, encouraged increasing po sodium, reviewed dietary sources of sodium, will start on sodium tablets if does not increase with dietary salt.  Pt has history of chronic hearing loss for years, but has noticed worse over the past few months, will refer to ENT.  Labs reviewed, pt is cleared for treatment, week of Good Friday will see on Thurs due to holiday  ==04/17/2025: Seeing ENT next week for fluid behind ear drum per pcp and tinnitus, stem cell transplant delayed by bmt to August, will continue dina-vrd. Last xgeva was 4/11 will continue  ==05/09/2025: Tolerating chemo well, due for xgeva as well, had virtual visit with bmt and plan to proceed with stem cell transplant in August. Labs reviewed, pt is cleared for xgeva and chemotherapy  ==05/22/2025: Tolerating therapy well, myeloma labs drawn, not resulted yet. Labs reviewed, pt is cleared for chemotherapy  ==05/29/2025: Pt presented to clinic as walk in/prn visit reported rash from medication.  Pt has a few small red bumps to bilateral arms, not urticra, not herpes zoster, will start on daily pepcid and zyrtec or claritin.  Pt is on week off of lenolidamide, received dina last week.  Also has been in sun a lot recently encouraged protective clothing/sunblock. Educated on notifying clinic if worsens. No pruritus  ==06/06/2025: Due for  Xgeva, Velcade and Daratumamab, rash to arms and back improved, is mild, related to Revlimid, pt is taking Zyrtec prn, will start taking daily. No other complaints, will continue current treatment  ==06/20/2025: FLC ratio 20.17, kappa 58.5, lambda 2.9, scanned to chart in media, labs reviewed, pt cleared for treatment  ==07/03/2025: Tolerating chemotherapy well, labs reviewed, pt is cleared for xgeva and chemo    2. Prophylaxis for infection prevention  == Continue Bactrim and acyclovir    3. Hypophosphatemia  ==Asymptomatic, normal magnesium level - pt has had leg cramps all of life, no increase previous phos levels wnl  ==Recheck to determine if transient, avoid antacids, check vitamin D level  ==encourage po intake, will start multivitamin containing phosphorus, may need higher dose if does not resolve, will prescribe with caution given renal function  == 11/22/24: Resolved      Plan:    stop  CyBorD - until start Dina VRD  Continue Dina-VRD  Cbc cmp weekly, myeloma labs q 4 weeks  NP appt in 2 weeks   Continue Bactrim and valtrex prophylaxis  Continue Xgeva - due 7/3/2025 (due on 7/4, but closed for July 4th holiday)        Total time spent in counseling and discussion about further management options including relevant lab work, treatment,  prognosis, medications and intended side effects was more than 30 minutes. More than 50 % of the time was spent in counseling and coordination of care.  This includes face to face time and non-face to face time preparing to see the patient (eg, review of tests), Obtaining and/or reviewing separately obtained history, Documenting clinical information in the electronic or other health record, Independently interpreting resultsand communicating results to the patient/family/caregiver, or Care coordination.

## 2025-07-03 NOTE — H&P (VIEW-ONLY)
HEMATOLOGY FOLLOW UP CONSULTATION NOTE    Patient ID: Miky Carrasco is a 54 y.o. male.    Chief Complaint:  Multiple myeloma    HPI:  Patient is a 53-year-old male with past medical history of hyperlipidemia who recently was evaluated by Nephrology for worsening kidney function and underwent further lab work including serum protein electrophoresis which showed findings concerning for possible MGUS versus active multiple myeloma with decline in kidney.  Patient presents to our clinic today for further evaluation.  Voices no acute complaints. Denies bone pain, activity and appetite changes.              Past Medical History:   Diagnosis Date    CKD (chronic kidney disease)     History of colonic polyps     Multiple myeloma not having achieved remission     TMJ (dislocation of temporomandibular joint)        Family History   Problem Relation Name Age of Onset    Hypertension Mother      Diabetes Mother      Skin cancer Mother      Hypertension Father      Lymphoma Sister      Ulcerative colitis Neg Hx      Crohn's disease Neg Hx      Pancreatic cancer Neg Hx      Liver disease Neg Hx      Stomach cancer Neg Hx      Colon cancer Neg Hx      Throat cancer Neg Hx      Esophageal cancer Neg Hx         Social History     Socioeconomic History    Marital status:    Tobacco Use    Smoking status: Never     Passive exposure: Current    Smokeless tobacco: Former     Types: Chew   Substance and Sexual Activity    Alcohol use: Not Currently    Drug use: Not Currently     Types: Benzodiazepines    Sexual activity: Yes     Social Drivers of Health     Financial Resource Strain: Low Risk  (4/17/2025)    Received from [facility]    Overall Financial Resource Strain (CARDIA)     Difficulty of Paying Living Expenses: Not hard at all   Food Insecurity: No Food Insecurity (4/17/2025)    Received from [facility]    Hunger Vital Sign     Worried About Running Out of Food in the  Last Year: Never true     Ran Out of Food in the Last Year: Never true   Transportation Needs: No Transportation Needs (4/17/2025)    Received from [facility]    PRAPARE - Transportation     Lack of Transportation (Medical): No     Lack of Transportation (Non-Medical): No   Physical Activity: Insufficiently Active (2/20/2025)    Exercise Vital Sign     Days of Exercise per Week: 1 day     Minutes of Exercise per Session: 30 min   Stress: No Stress Concern Present (4/17/2025)    Received from [facility]    [employer] - Occupational Stress Questionnaire     Feeling of Stress : Not at all   Housing Stability: Unknown (4/17/2025)    Received from [facility]    Housing Stability Vital Sign     Unable to Pay for Housing in the Last Year: No     Homeless in the Last Year: No         Past Surgical History:   Procedure Laterality Date    BONE BIOPSY N/A          Review of systems:  Review of Systems   Constitutional:  Negative for activity change, appetite change, chills, diaphoresis, fatigue and unexpected weight change.   HENT:  Negative for congestion, facial swelling, hearing loss, mouth sores, trouble swallowing and voice change.    Eyes:  Negative for photophobia, pain, discharge and itching.   Respiratory:  Negative for apnea, cough, choking, chest tightness and shortness of breath.    Cardiovascular:  Negative for chest pain, palpitations and leg swelling.   Gastrointestinal:  Negative for abdominal distention, abdominal pain, anal bleeding and blood in stool.   Endocrine: Negative for cold intolerance, heat intolerance, polydipsia and polyphagia.   Genitourinary:  Negative for difficulty urinating, dysuria, flank pain and hematuria.   Musculoskeletal:  Negative for arthralgias, back pain, joint swelling, myalgias, neck pain and neck stiffness.   Skin:  Negative for color change, pallor and wound.    Allergic/Immunologic: Negative for environmental allergies, food allergies and immunocompromised state.   Neurological:  Negative for dizziness, seizures, facial asymmetry, speech difficulty, light-headedness, numbness and headaches.   Hematological:  Negative for adenopathy. Does not bruise/bleed easily.   Psychiatric/Behavioral:  Negative for agitation, behavioral problems, confusion, decreased concentration and sleep disturbance.                                Physical Exam  Vitals and nursing note reviewed.   Constitutional:       General: He is not in acute distress.     Appearance: Normal appearance. He is not ill-appearing.   HENT:      Head: Normocephalic and atraumatic.      Nose: No congestion or rhinorrhea.   Eyes:      General: No scleral icterus.     Extraocular Movements: Extraocular movements intact.      Pupils: Pupils are equal, round, and reactive to light.   Cardiovascular:      Rate and Rhythm: Normal rate and regular rhythm.      Pulses: Normal pulses.      Heart sounds: Normal heart sounds. No murmur heard.     No gallop.   Pulmonary:      Effort: Pulmonary effort is normal. No respiratory distress.      Breath sounds: Normal breath sounds. No stridor. No wheezing or rhonchi.   Abdominal:      General: Bowel sounds are normal. There is no distension.      Palpations: There is no mass.      Tenderness: There is no abdominal tenderness. There is no guarding.   Musculoskeletal:         General: No swelling, tenderness, deformity or signs of injury. Normal range of motion.      Cervical back: Normal range of motion and neck supple. No rigidity. No muscular tenderness.      Right lower leg: No edema.      Left lower leg: No edema.   Skin:     General: Skin is warm.      Coloration: Skin is not jaundiced or pale.      Findings: Rash present. No bruising or lesion.   Neurological:      General: No focal deficit present.      Mental Status: He is alert and oriented to person, place, and time.       Cranial Nerves: No cranial nerve deficit.      Sensory: No sensory deficit.      Motor: No weakness.      Gait: Gait normal.   Psychiatric:         Mood and Affect: Mood normal.         Behavior: Behavior normal.         Thought Content: Thought content normal.     Vitals:    07/03/25 0840   BP: 119/76   Pulse: 60   Resp: 16   Temp: 98.4 °F (36.9 °C)             Body surface area is 2.12 meters squared.    Assessment/Plan:      ECOG 1    Multiple myeloma:    == reviewed prior lab work done with Nephrology.  Patient is noted to have worsening renal failure and also has presence of monoclonal band on immuno fixation studies.  I will initiate workup for multiple myeloma and will include a bone marrow biopsy as well as a PET scan to rule out any osteolytic lesions.  Discussed these findings in detail with patient giving him opportunity to ask questions  == 8/18/24:  Bone marrow biopsy done showed increased number of plasma cells.  No percentage mentioned but reported to be more than normal and in the smoldering range.  With evidence of worsening renal failure along with elevated kappa free light chains and kappa/lambda free light chain ratio findings are consistent with active multiple myeloma.  Due to decline in kidney function my initial plan would be to initiate CyBorD.  Patient reports that he is on a job which requires lot of traveling and unable to come to clinics  regularly do receive his treatment.   ==9/23/24:  Patient and wife here today to discuss upcoming chemotherapy/immunotherapy. An extensive discussion was had which included a thorough discussion of potential side effect profile, risk versus benefits, and expected outcomes.  Risks, including but not limited to, possible hair loss, bone marrow damage, damage to body organs, allergic reactions, sterility, nausea/vomiting, constipation/diarrhea, sores in the mouth, secondary cancers, and rarely death were all discuss.  Specific side effects pertaining to  their chemotherapy/immunotherapy medications were discussed as well.  The patient agrees with the plan and all questions and their support systems questions have been answered to their satisfaction.  Premedications were prescribed and patient was educated on appropriate usage.  Patient was educated on when to call, and given the number to call, or to notify the provider including but not limited to:  Persistent nausea and vomiting, dehydration, fever greater than 100.4 lasting over 1 hour induration or isolated feeders greater than 101, rash while on active chemotherapy or immunotherapy, severe pain or new onset pain not controlled by current pain medication regimen.   ==10/04/2024: Tolerating chemotherapy well, pt has leg cramps has had all of his life worse at night, potassium mg normal, discussed medication, pt would like to try otc topicals such as biofreeze or icyhot with lidocaine first and if not helpful will notify clinic and will send our rx.  No other complaints, will continue chemotherapy, plan to see next week and if continues to tolerate well plan to see every other week.  ==10/11/2024: Magnesium wnl, but hypophosphatemia noted, pt denies diarrhea or antacid use.  Will start gentle supplementation with po multivitamin, discussed with patient his renal function and may need po phosphate if repeat labs show continued low phosphorus, but black box warning in CKD for po phosphate.  Will try multivitamin containing phos as well as increasing dietary intake - reviewed dietary sources of phosphorus.  Will also check vitamin D level, Vit D deficiency can cause/worsen hypophosphatemia and recheck phos level to determine if transient.  Pt is asymptomatic at this time has occasional leg cramps for all of his life, no increase or other symptoms.  Labs reviewed will continue chemotherapy   ==11/22/2024: Phos wnl, tolerating Cybor D well, leg cramps improved with baclofen, labs reviewed patient is cleared for  chemotherapy. Will now also make referral to BMT team in [place].   ==12/27/2024: Has appt with Dr. Cardoso 1/16/2025, tolerating CyBorD well.  May use labs from BMT appt on 1/16/25 for chemo scheduled on 1/17/2024, will need cbc cmp. Denies complaints, will continue with CyBorD.  ==02/21/2025: Tolerating CyBorD well, next appt with Dr. Cardoso is on 2/27/2025 for transplant eval. Last myeloma labs were on 1/24/25 so I will have drawn today so they will be resulted for his visit next week. Whole body PET ct reviewed which showed bony degenerative changes without evidence of bony destruction, no hypermetabolic activity seen and no interval changes from prior study.  Will continue CyBorD at this time.   ==03/07/2025: Pt recently had visit with Dr. Meredith with transplant who recommends patient to switch to Dina+VRD and start Xgeva. He has had dental clearance which was sent to [facility] will be scanned in chart.  Plan to send Dina+VRD as well as xgeva for approval and continue CyborD until approved. Will add hep panel and type and screen to next labs in anticipation of starting Dina  ==03/14/2025:  Patient here today to discuss upcoming chemotherapy/immunotherapy. An extensive discussion was had which included a thorough discussion of potential side effect profile, risk versus benefits, and expected outcomes.  Risks, including but not limited to, possible hair loss, bone marrow damage, damage to body organs, allergic reactions, sterility, nausea/vomiting, constipation/diarrhea, sores in the mouth, secondary cancers, and rarely death were all discuss.  Specific side effects pertaining to their chemotherapy/immunotherapy medications were discussed as well.  The patient agrees with the plan and all questions and their support systems questions have been answered to their satisfaction.  Premedications were prescribed and patient was educated on appropriate usage.  Patient was educated on when to call, and given the number  to call, or to notify the provider including but not limited to:  Persistent nausea and vomiting, dehydration, fever greater than 100.4 lasting over 1 hour induration or isolated feeders greater than 101, rash while on active chemotherapy or immunotherapy, severe pain or new onset pain not controlled by current pain medication regimen. Starting Dina-VRD and xgeva today, pt doesn't have revlimid yet, will cont cytoxan until he receives it.  ==03/21/2025: Tolerated first cycle of VRD well, occasional headaches relived with tylenol and caffeine, labs reviewed, pt is cleared for treatment  ==04/04/2025: Sodium 127, pt having dull headaches, encouraged increasing po sodium, reviewed dietary sources of sodium, will start on sodium tablets if does not increase with dietary salt.  Pt has history of chronic hearing loss for years, but has noticed worse over the past few months, will refer to ENT.  Labs reviewed, pt is cleared for treatment, week of Good Friday will see on Thurs due to holiday  ==04/17/2025: Seeing ENT next week for fluid behind ear drum per pcp and tinnitus, stem cell transplant delayed by bmt to August, will continue dina-vrd. Last xgeva was 4/11 will continue  ==05/09/2025: Tolerating chemo well, due for xgeva as well, had virtual visit with bmt and plan to proceed with stem cell transplant in August. Labs reviewed, pt is cleared for xgeva and chemotherapy  ==05/22/2025: Tolerating therapy well, myeloma labs drawn, not resulted yet. Labs reviewed, pt is cleared for chemotherapy  ==05/29/2025: Pt presented to clinic as walk in/prn visit reported rash from medication.  Pt has a few small red bumps to bilateral arms, not urticra, not herpes zoster, will start on daily pepcid and zyrtec or claritin.  Pt is on week off of lenolidamide, received dina last week.  Also has been in sun a lot recently encouraged protective clothing/sunblock. Educated on notifying clinic if worsens. No pruritus  ==06/06/2025: Due for  Xgeva, Velcade and Daratumamab, rash to arms and back improved, is mild, related to Revlimid, pt is taking Zyrtec prn, will start taking daily. No other complaints, will continue current treatment  ==06/20/2025: FLC ratio 20.17, kappa 58.5, lambda 2.9, scanned to chart in media, labs reviewed, pt cleared for treatment  ==07/03/2025: Tolerating chemotherapy well, labs reviewed, pt is cleared for xgeva and chemo    2. Prophylaxis for infection prevention  == Continue Bactrim and acyclovir    3. Hypophosphatemia  ==Asymptomatic, normal magnesium level - pt has had leg cramps all of life, no increase previous phos levels wnl  ==Recheck to determine if transient, avoid antacids, check vitamin D level  ==encourage po intake, will start multivitamin containing phosphorus, may need higher dose if does not resolve, will prescribe with caution given renal function  == 11/22/24: Resolved      Plan:    stop  CyBorD - until start Dina VRD  Continue Dina-VRD  Cbc cmp weekly, myeloma labs q 4 weeks  NP appt in 2 weeks   Continue Bactrim and valtrex prophylaxis  Continue Xgeva - due 7/3/2025 (due on 7/4, but closed for July 4th holiday)        Total time spent in counseling and discussion about further management options including relevant lab work, treatment,  prognosis, medications and intended side effects was more than 30 minutes. More than 50 % of the time was spent in counseling and coordination of care.  This includes face to face time and non-face to face time preparing to see the patient (eg, review of tests), Obtaining and/or reviewing separately obtained history, Documenting clinical information in the electronic or other health record, Independently interpreting resultsand communicating results to the patient/family/caregiver, or Care coordination.

## 2025-07-09 ENCOUNTER — PATIENT MESSAGE (OUTPATIENT)
Dept: HEMATOLOGY/ONCOLOGY | Facility: CLINIC | Age: 55
End: 2025-07-09
Payer: COMMERCIAL

## 2025-07-15 ENCOUNTER — PATIENT MESSAGE (OUTPATIENT)
Dept: HEMATOLOGY/ONCOLOGY | Facility: CLINIC | Age: 55
End: 2025-07-15

## 2025-07-15 DIAGNOSIS — Z76.82 STEM CELL TRANSPLANT CANDIDATE: ICD-10-CM

## 2025-07-15 DIAGNOSIS — C90.00 MULTIPLE MYELOMA NOT HAVING ACHIEVED REMISSION: Primary | ICD-10-CM

## 2025-07-15 DIAGNOSIS — C90.00 MULTIPLE MYELOMA NOT HAVING ACHIEVED REMISSION: ICD-10-CM

## 2025-07-15 DIAGNOSIS — D84.81 IMMUNODEFICIENCY DUE TO CONDITIONS CLASSIFIED ELSEWHERE: ICD-10-CM

## 2025-07-15 DIAGNOSIS — N18.32 STAGE 3B CHRONIC KIDNEY DISEASE: ICD-10-CM

## 2025-07-15 RX ORDER — LENALIDOMIDE 25 MG/1
CAPSULE ORAL
Qty: 21 CAPSULE | Refills: 0 | Status: SHIPPED | OUTPATIENT
Start: 2025-07-15

## 2025-07-16 ENCOUNTER — ANESTHESIA EVENT (OUTPATIENT)
Dept: ENDOSCOPY | Facility: HOSPITAL | Age: 55
End: 2025-07-16
Payer: COMMERCIAL

## 2025-07-16 ENCOUNTER — HOSPITAL ENCOUNTER (OUTPATIENT)
Dept: RADIOLOGY | Facility: HOSPITAL | Age: 55
Discharge: HOME | End: 2025-07-16
Attending: INTERNAL MEDICINE
Payer: COMMERCIAL

## 2025-07-16 ENCOUNTER — ANESTHESIA (OUTPATIENT)
Dept: ENDOSCOPY | Facility: HOSPITAL | Age: 55
End: 2025-07-16
Payer: COMMERCIAL

## 2025-07-16 ENCOUNTER — HOSPITAL ENCOUNTER (OUTPATIENT)
Facility: HOSPITAL | Age: 55
Discharge: HOME | End: 2025-07-16
Attending: INTERNAL MEDICINE | Admitting: INTERNAL MEDICINE
Payer: COMMERCIAL

## 2025-07-16 VITALS
HEIGHT: 71 IN | TEMPERATURE: 98 F | SYSTOLIC BLOOD PRESSURE: 135 MMHG | RESPIRATION RATE: 16 BRPM | WEIGHT: 194.69 LBS | DIASTOLIC BLOOD PRESSURE: 66 MMHG | BODY MASS INDEX: 27.26 KG/M2 | OXYGEN SATURATION: 97 % | HEART RATE: 67 BPM

## 2025-07-16 DIAGNOSIS — C90.00 MULTIPLE MYELOMA: ICD-10-CM

## 2025-07-16 DIAGNOSIS — C90.00 MULTIPLE MYELOMA NOT HAVING ACHIEVED REMISSION: ICD-10-CM

## 2025-07-16 DIAGNOSIS — C90.00 MULTIPLE MYELOMA NOT HAVING ACHIEVED REMISSION: Primary | ICD-10-CM

## 2025-07-16 DIAGNOSIS — Z76.82 STEM CELL TRANSPLANT CANDIDATE: ICD-10-CM

## 2025-07-16 LAB — POCT GLUCOSE: 150 MG/DL (ref 70–110)

## 2025-07-16 PROCEDURE — 88184 FLOWCYTOMETRY/ TC 1 MARKER: CPT

## 2025-07-16 PROCEDURE — 63600175 PHARM REV CODE 636 W HCPCS

## 2025-07-16 PROCEDURE — 37000009 HC ANESTHESIA EA ADD 15 MINS: Performed by: INTERNAL MEDICINE

## 2025-07-16 PROCEDURE — 78816 PET IMAGE W/CT FULL BODY: CPT | Mod: TC

## 2025-07-16 PROCEDURE — 78816 PET IMAGE W/CT FULL BODY: CPT | Mod: 26,PS,, | Performed by: NUCLEAR MEDICINE

## 2025-07-16 PROCEDURE — 38222 DX BONE MARROW BX & ASPIR: CPT | Performed by: INTERNAL MEDICINE

## 2025-07-16 PROCEDURE — 88184 FLOWCYTOMETRY/ TC 1 MARKER: CPT | Mod: 59

## 2025-07-16 PROCEDURE — 25000003 PHARM REV CODE 250

## 2025-07-16 PROCEDURE — 37000008 HC ANESTHESIA 1ST 15 MINUTES: Performed by: INTERNAL MEDICINE

## 2025-07-16 PROCEDURE — 88299 UNLISTED CYTOGENETIC STUDY: CPT

## 2025-07-16 PROCEDURE — 88185 FLOWCYTOMETRY/TC ADD-ON: CPT

## 2025-07-16 PROCEDURE — 88185 FLOWCYTOMETRY/TC ADD-ON: CPT | Mod: 59

## 2025-07-16 PROCEDURE — A9552 F18 FDG: HCPCS | Performed by: INTERNAL MEDICINE

## 2025-07-16 PROCEDURE — 88271 CYTOGENETICS DNA PROBE: CPT

## 2025-07-16 RX ORDER — PROPOFOL 10 MG/ML
VIAL (ML) INTRAVENOUS
Status: DISCONTINUED | OUTPATIENT
Start: 2025-07-16 | End: 2025-07-16

## 2025-07-16 RX ORDER — SODIUM CHLORIDE 9 MG/ML
INJECTION, SOLUTION INTRAVENOUS CONTINUOUS
Status: DISCONTINUED | OUTPATIENT
Start: 2025-07-16 | End: 2025-07-16 | Stop reason: HOSPADM

## 2025-07-16 RX ORDER — LIDOCAINE HYDROCHLORIDE 20 MG/ML
INJECTION INTRAVENOUS
Status: DISCONTINUED | OUTPATIENT
Start: 2025-07-16 | End: 2025-07-16

## 2025-07-16 RX ORDER — LIDOCAINE HYDROCHLORIDE 20 MG/ML
INJECTION, SOLUTION INFILTRATION; PERINEURAL
Status: DISCONTINUED | OUTPATIENT
Start: 2025-07-16 | End: 2025-07-16 | Stop reason: HOSPADM

## 2025-07-16 RX ORDER — FLUDEOXYGLUCOSE F18 500 MCI/ML
12 INJECTION INTRAVENOUS
Status: COMPLETED | OUTPATIENT
Start: 2025-07-16 | End: 2025-07-16

## 2025-07-16 RX ADMIN — FLUDEOXYGLUCOSE F-18 11.49 MILLICURIE: 500 INJECTION INTRAVENOUS at 08:07

## 2025-07-16 RX ADMIN — PROPOFOL 150 MCG/KG/MIN: 10 INJECTION, EMULSION INTRAVENOUS at 11:07

## 2025-07-16 RX ADMIN — PROPOFOL 100 MG: 10 INJECTION, EMULSION INTRAVENOUS at 11:07

## 2025-07-16 RX ADMIN — PROPOFOL 50 MG: 10 INJECTION, EMULSION INTRAVENOUS at 11:07

## 2025-07-16 RX ADMIN — LIDOCAINE HYDROCHLORIDE 100 MG: 20 INJECTION INTRAVENOUS at 11:07

## 2025-07-16 RX ADMIN — SODIUM CHLORIDE: 0.9 INJECTION, SOLUTION INTRAVENOUS at 11:07

## 2025-07-16 NOTE — PLAN OF CARE
Discharge summary reviewed with patient verbalizes understanding. PIV removed, cannula intect. Coban dressing intact. Dressing on right buttock dry, clean and intact NAD noted

## 2025-07-16 NOTE — DISCHARGE SUMMARY
"[facility] 4th Floor  Hematology  Bone Marrow Transplant  Discharge Summary      Patient Name: Miky Carrasco  MRN: [id number]  Admission Date: 7/16/2025  Hospital Length of Stay: 0 days  Discharge Date and Time: 07/16/2025 1:13 PM  Attending Physician: Tereso Cardoso MD   Discharging Provider: Khoa Espinoza PA-C  Primary Care Provider: Syd Feliciano MD    HPI: No notes on file    Procedure(s) (LRB):  Biopsy-bone marrow (Right)     Hospital Course:   Patient admitted to endoscopy suite today for a bone marrow aspiration and biopsy. Pt was consented for a bone marrow biopsy. Patient was sedated per anesthesia and a bone marrow biopsy and aspiration was performed in the endoscopy suite procedure room (see procedure note). Patient was then transferred to post op recovery area and discharged home when appropriate per anesthesia.      Goals of Care Treatment Preferences:             Significant Diagnostic Studies: Labs: CMP No results for input(s): "NA", "K", "CL", "CO2", "GLU", "BUN", "CREATININE", "CALCIUM", "PROT", "ALBUMIN", "BILITOT", "ALKPHOS", "AST", "ALT", "ANIONGAP", "ESTGFRAFRICA", "EGFRNONAA" in the last 48 hours. and CBC No results for input(s): "WBC", "HGB", "HCT", "PLT" in the last 48 hours.    Pending Diagnostic Studies:       Procedure Component Value Units Date/Time    FLOW SCREEN PANEL (TECH ADD-ON) [[id number]] Collected: 07/16/25 1119    Order Status: Sent Lab Status: In process Updated: 07/16/25 1217    Specimen: Bone Marrow     Flow Plasma Cell Panel [[id number]] Collected: 07/16/25 1119    Order Status: Sent Lab Status: In process Updated: 07/16/25 1306    Specimen: Bone Marrow     Heme Disorders DNA/RNA Hold -Bone Marrow [[id number]] Collected: 07/16/25 1119    Order Status: Sent Lab Status: No result     Specimen: Bone Marrow     Leukemia/Lymphoma Screen - Bone Marrow Right Posterior Iliac Crest [[id number]] Collected: 07/16/25 1119    Order Status: Sent Lab Status: In " process Updated: 07/16/25 1216    Specimen: Bone Marrow     Multiple Myeloma MRD by Flow, BM [[id number]] Collected: 07/16/25 1119    Order Status: Sent Lab Status: No result     Specimen: Bone Marrow     Plasma Cell Proliferative Disorder (PCPD), FISH [[id number]] Collected: 07/16/25 1119    Order Status: Sent Lab Status: No result     Specimen: Bone Marrow Aspirate, Right Iliac Crest           There are no hospital problems to display for this patient.     Discharged Condition: stable    Disposition: Home or Self Care    Follow Up:    Future Appointments   Date Time Provider Department Center   7/17/2025 10:20 AM Sara De León, NP [facility] [address]   7/21/2025  8:00 AM SPECIMEN, HEMON CANCER BLDG [facility] LAB [facility]   7/21/2025  8:10 AM [facility] LAB BMT [facility]   7/21/2025  8:30 AM BMT, TRANSPLANT COORDINATOR [facility] BMT [facility]   7/21/2025  8:30 AM SPECIMEN LAB, [facility] BONE MARROW [facility]BMT [facility]   7/21/2025  9:00 AM BMT, PHARMACIST MYELOMA [facility] BMT [facility]   7/21/2025  9:30 AM BMT, PHERESIS NURSE [facility] BMT [facility]   7/21/2025 10:00 AM BMT, BLOOD BANK PHYSICIAN [facility] BMT [facility]   7/21/2025 11:00 AM Khadar Reyes LCSW [facility] BMT [facility]   7/21/2025 11:30 AM BMT, BLOOD PLATELET  [facility] BMT [facility]   7/21/2025  1:00 PM BMT FIN COORD, [facility] TRANSPLANT [facility] BMT [facility]   7/21/2025  1:30 PM [facility] [id number] 485 LB LIMIT [facility] XRAY OP [facility]   7/21/2025  1:45 PM ECHO, [facility] ECHOSTR [facility]   7/21/2025  2:30 PM EKG, APPT [facility] EKG [facility]   7/21/2025  2:45 PM PULMONARY FUNCTION [facility] PULMLAB [facility]   7/21/2025  3:00 PM PULMONARY FUNCTION [facility] PULMLAB [facility]   8/7/2025  8:00 AM Dany Shah RD [facility]AN [place]   8/7/2025  9:00 AM Lissett Dee, PhD MAINOR [facility]   8/7/2025  9:00 AM BMT CANPSYCH ASSESSMENT, MAINOR [facility]   8/7/2025 11:00 AM Janae,  Terseo WIGGINS MD [facility] BMT [facility]   8/15/2025  8:00 AM INJECTION, [facility] INFUSION [facility]   8/16/2025  8:00 AM INJECTION, [facility] INFUSION [facility]   8/17/2025  8:00 AM INJECTION, [facility] INFUSION [facility]   8/18/2025  8:00 AM INJECTION, [facility] INFUSION [facility]   8/18/2025  5:00 PM CHEMO 1 [facility]   8/19/2025  8:00 AM APHERESIS, ELSA ASHLEY [facility]HER [facility]   8/19/2025  5:00 PM APHERESIS, ELSA ASHLEY [facility] APHER [facility]   8/20/2025  8:00 AM APHERESIS, ELSA ASHLEY [facility] APHER [facility]   8/22/2025 10:00 AM INJECTION, [facility] INFUSION [facility]   8/22/2025 10:45 AM BMT, NURSE DRAW [facility] BMT [facility]   8/22/2025 11:20 AM Shoshana Schaeffer PA-C [facility] BMT [facility]      Patient Instructions:      Diet Adult Regular     Notify your health care provider if you experience any of the following:  temperature >100.4     Notify your health care provider if you experience any of the following:  persistent nausea and vomiting or diarrhea     Notify your health care provider if you experience any of the following:  severe uncontrolled pain     Notify your health care provider if you experience any of the following:  redness, tenderness, or signs of infection (pain, swelling, redness, odor or green/yellow discharge around incision site)     Notify your health care provider if you experience any of the following:  difficulty breathing or increased cough     Notify your health care provider if you experience any of the following:  severe persistent headache     Notify your health care provider if you experience any of the following:  worsening rash     Notify your health care provider if you experience any of the following:  persistent dizziness, light-headedness, or visual disturbances     Notify your health care provider if you experience any of the following:  increased confusion or weakness     Remove dressing in 24 hours      Activity as tolerated     Medications:  Reconciled Home Medications:      Medication List        ASK your doctor about these medications      acyclovir 400 MG tablet  Commonly known as: ZOVIRAX  Take 1 tablet (400 mg total) by mouth 2 (two) times daily.     aspirin 81 MG Chew  Take 1 tablet (81 mg total) by mouth once daily.     calcium carbonate 260 mg calcium (650 mg) Chew  Take by mouth.     cetirizine 10 MG tablet  Commonly known as: ZYRTEC  Take 1 tablet (10 mg total) by mouth once daily.     cyclobenzaprine 10 MG tablet  Commonly known as: FLEXERIL  TAKE 1/2 TO 1 (ONE-HALF TO ONE) TABLET BY MOUTH THREE TIMES DAILY AS NEEDED FOR MUSCLE SPASM     * dexAMETHasone 4 MG Tab  Commonly known as: DECADRON  Take 10 tablets (40 mg total) by mouth every 7 days. on days 1, 8, 15, and 22 of each chemotherapy cycle. Take with food.     * dexAMETHasone 4 MG Tab  Commonly known as: DECADRON  Take 10 tablets (40 mg total) by mouth every 7 days. Take with food.     diphenhydrAMINE 25 mg capsule  Commonly known as: BENADRYL  Take 25 mg by mouth.     EScitalopram oxalate 10 MG tablet  Commonly known as: LEXAPRO  Take 1 tablet (10 mg total) by mouth once daily.     fluticasone propionate 50 mcg/actuation nasal spray  Commonly known as: FLONASE  1 spray (50 mcg total) by Each Nostril route once daily.     methocarbamoL 750 MG Tab  Commonly known as: ROBAXIN  Take by mouth.     montelukast 10 mg tablet  Commonly known as: SINGULAIR  Take 10 mg by mouth.     multivitamin Tab  Take 1 tablet by mouth.     neomycin-polymyxin-hydrocortisone 3.5-10,000-1 mg/mL-unit/mL-% otic suspension  Commonly known as: CORTISPORIN  INSTILL 4 DROPS INTO AFFECTED EAR(S) 2 TO 3 TIMES DAILY     ondansetron 8 MG tablet  Commonly known as: ZOFRAN  Take 1 tablet (8 mg total) by mouth every 8 (eight) hours as needed.     pantoprazole 40 MG tablet  Commonly known as: PROTONIX  Take 1 tablet (40 mg total) by mouth once daily.     prochlorperazine 5 MG  tablet  Commonly known as: COMPAZINE  Take 2 tablets (10 mg total) by mouth every 6 (six) hours as needed for Nausea.     REVLIMID 25 mg Cap  Generic drug: lenalidomide  TAKE 1 CAPSULE ONCE DAILY FOR 21 DAYS ON THEN 7 DAYS OFF     sulfamethoxazole-trimethoprim 400-80mg 400-80 mg per tablet  Commonly known as: BACTRIM  Take 1 tablet by mouth once daily. for 360 doses           * This list has 2 medication(s) that are the same as other medications prescribed for you. Read the directions carefully, and ask your doctor or other care provider to review them with you.                  Khoa Espinoza PA-C  Bone Marrow Transplant  [facility] 4th [facility]

## 2025-07-16 NOTE — DISCHARGE INSTRUCTIONS
Discharge instructions for having a Bone Marrow Aspiration / Biopsy:    Keep Bandage in place for 24 hours.  - Do not shower or take a tube bath during this time (you may sponge bathe).  - Call the nurse or physician for excessive bleeding or pain at biopsy site.  - You may take Tylenol as needed for pain.    During procedure today you have received medication to sedate you. These medications may affect your judgment, balance, and/or coordination. Therefore, for the next 24 hours, you have the follow restrictions:  - Do not drive a car or operate heavy machinery for the rest of the day.  - Do not make legal/financial decisions, sign important papers, or drink alcohol.  - You may resume other activities as tolerated.    You can call [phone] for any problems during the hours of 8:00 AM-5:00PM.    For an emergency after 5:00 PM you can call [phone] and have the  page the Hematologist / Oncologist on call.

## 2025-07-16 NOTE — PROCEDURES
"Miky Carrasco is a 54 y.o. male patient.    Temp: 97.7 °F (36.5 °C) (07/16/25 1141)  Pulse: 67 (07/16/25 1211)  Resp: 16 (07/16/25 1211)  BP: 135/66 (07/16/25 1211)  SpO2: 97 % (07/16/25 1211)  Weight: 88.3 kg (194 lb 10.7 oz) (07/16/25 1056)  Height: 5' 11" (180.3 cm) (07/16/25 1056)       Bone marrow    Date/Time: 7/16/2025 1:11 PM    Performed by: Khoa Espinoza PA-C  Authorized by: Khoa Espinoza PA-C    Aspiration?: Yes   Biopsy?: Yes    PROCEDURE NOTE:  Date of Procedure: 7/16/25  Bone Marrow Biopsy and Aspiration  Indication: pre auto SCT workup MM  Consent: Informed consent was obtained from patient.  Timeout: Done and documented. Allergies reviewed.  Position: RLD  Site: Left posterior illiac crest.  Prep: Betadine.  Needle used: 11 gauge Jamshidi needle.  Anesthetic: 2% lidocaine 5 cc.  Biopsy: The biopsy needle was introduced into the marrow cavity and an aspirate was obtained without complications and sent for flow cytometry, MRD, PCPD fish, DNA/RNA and cytogenetics. Core biopsy obtained without difficulty and sent for routine histologic examination.  Complications: None.  Disposition: The patient was placed supine following procedure. RN to assess bandaid for bleeding prior to discharge home. Patient aware to keep bandaid dry and intact for 24hrs and to call clinic for any bleeding, fevers, pain, or signs of infection. Discharge home per anesthesia protocol   Blood loss: Minimal.     Khoa Espinoza PA-C  Hematology/Oncology/BMT     7/16/2025    "

## 2025-07-16 NOTE — TRANSFER OF CARE
"Anesthesia Transfer of Care Note    Patient: Miky Carrasco    Procedure(s) Performed: Procedure(s) (LRB):  Biopsy-bone marrow (Right)    Patient location: GI    Anesthesia Type: general    Transport from OR: Transported from OR on room air with adequate spontaneous ventilation    Post pain: adequate analgesia    Post assessment: no apparent anesthetic complications and tolerated procedure well    Post vital signs: stable    Level of consciousness: awake    Nausea/Vomiting: no nausea/vomiting    Complications: none    Transfer of care protocol was followed      Last vitals: Visit Vitals  /71 (BP Location: Left arm, Patient Position: Lying)   Pulse 62   Temp 36.7 °C (98.1 °F) (Tympanic)   Resp 16   Ht 5' 11" (1.803 m)   Wt 88.3 kg (194 lb 10.7 oz)   SpO2 98%   BMI 27.15 kg/m²     "

## 2025-07-16 NOTE — ANESTHESIA PREPROCEDURE EVALUATION
07/16/2025  Miky Carrasco is a 54 y.o., male.    Past Medical History:   Diagnosis Date    CKD (chronic kidney disease)     History of colonic polyps     Multiple myeloma not having achieved remission     TMJ (dislocation of temporomandibular joint)      Past Surgical History:   Procedure Laterality Date    BONE BIOPSY N/A      No results found for this or any previous visit.  No echocardiogram results found for the past 12 months      Pre-op Assessment    I have reviewed the Patient Summary Reports.    I have reviewed the NPO Status.   I have reviewed the Medications.     Review of Systems  Anesthesia Hx:  No problems with previous Anesthesia                Social:  Former Smoker       Hematology/Oncology:  Hematology Normal   Oncology Normal                                   EENT/Dental:  EENT/Dental Normal           Cardiovascular:  Cardiovascular Normal                                              Pulmonary:  Pulmonary Normal                       Renal/:  Chronic Renal Disease, CKD                Hepatic/GI:  Hepatic/GI Normal                    Musculoskeletal:  Musculoskeletal Normal                Neurological:  Neurology Normal                                      Endocrine:  Endocrine Normal            Dermatological:  Skin Normal    Psych:  Psychiatric Normal                    Physical Exam  General: Well nourished, Cooperative, Alert and Oriented    Airway:  Mallampati: II   Mouth Opening: Normal  TM Distance: Normal  Tongue: Normal  Neck ROM: Normal ROM    Dental:  Intact        Anesthesia Plan  Type of Anesthesia, risks & benefits discussed:    Anesthesia Type: Gen Natural Airway  Intra-op Monitoring Plan: Standard ASA Monitors  Induction:  IV  ASA Score: 2  Day of Surgery Review of History & Physical: H&P Update referred to the surgeon/provider.I have interviewed and examined the patient. I  have reviewed the patient's H&P dated:     Ready For Surgery From Anesthesia Perspective.     .

## 2025-07-16 NOTE — PLAN OF CARE
Name and  verified, armband applied. Plan of care reviewed w pt. Pt has no questions at this time, verbalizes understanding.

## 2025-07-16 NOTE — INTERVAL H&P NOTE
The patient has been examined and the H&P has been reviewed:    I concur with the findings and no changes have occurred since H&P was written.    Procedure risks, benefits and alternative options discussed and understood by patient/family.          There are no hospital problems to display for this patient.     Risks/benefits discussed with patient or patient surrogate

## 2025-07-17 RX ORDER — BORTEZOMIB 3.5 MG/1
1.3 INJECTION, POWDER, LYOPHILIZED, FOR SOLUTION INTRAVENOUS; SUBCUTANEOUS
OUTPATIENT
Start: 2025-07-31

## 2025-07-17 RX ORDER — ACETAMINOPHEN 325 MG/1
650 TABLET ORAL
OUTPATIENT
Start: 2025-07-24

## 2025-07-17 RX ORDER — ACETAMINOPHEN 325 MG/1
650 TABLET ORAL
Status: CANCELLED | OUTPATIENT
Start: 2025-07-17

## 2025-07-17 RX ORDER — BORTEZOMIB 3.5 MG/1
1.3 INJECTION, POWDER, LYOPHILIZED, FOR SOLUTION INTRAVENOUS; SUBCUTANEOUS
OUTPATIENT
Start: 2025-07-24

## 2025-07-17 RX ORDER — EPINEPHRINE 0.3 MG/.3ML
0.3 INJECTION SUBCUTANEOUS ONCE AS NEEDED
OUTPATIENT
Start: 2025-07-24

## 2025-07-17 RX ORDER — PROCHLORPERAZINE EDISYLATE 5 MG/ML
10 INJECTION INTRAMUSCULAR; INTRAVENOUS ONCE AS NEEDED
OUTPATIENT
Start: 2025-07-31

## 2025-07-17 RX ORDER — DIPHENHYDRAMINE HYDROCHLORIDE 50 MG/ML
50 INJECTION, SOLUTION INTRAMUSCULAR; INTRAVENOUS ONCE AS NEEDED
Status: CANCELLED | OUTPATIENT
Start: 2025-07-17

## 2025-07-17 RX ORDER — HEPARIN 100 UNIT/ML
500 SYRINGE INTRAVENOUS
OUTPATIENT
Start: 2025-08-07

## 2025-07-17 RX ORDER — BORTEZOMIB 3.5 MG/1
1.3 INJECTION, POWDER, LYOPHILIZED, FOR SOLUTION INTRAVENOUS; SUBCUTANEOUS
OUTPATIENT
Start: 2025-08-07

## 2025-07-17 RX ORDER — HEPARIN 100 UNIT/ML
500 SYRINGE INTRAVENOUS
Status: CANCELLED | OUTPATIENT
Start: 2025-07-17

## 2025-07-17 RX ORDER — HEPARIN 100 UNIT/ML
500 SYRINGE INTRAVENOUS
OUTPATIENT
Start: 2025-07-24

## 2025-07-17 RX ORDER — SODIUM CHLORIDE 0.9 % (FLUSH) 0.9 %
10 SYRINGE (ML) INJECTION
Status: CANCELLED | OUTPATIENT
Start: 2025-07-17

## 2025-07-17 RX ORDER — PROCHLORPERAZINE EDISYLATE 5 MG/ML
10 INJECTION INTRAMUSCULAR; INTRAVENOUS ONCE AS NEEDED
Status: CANCELLED | OUTPATIENT
Start: 2025-07-17

## 2025-07-17 RX ORDER — HEPARIN 100 UNIT/ML
500 SYRINGE INTRAVENOUS
OUTPATIENT
Start: 2025-07-31

## 2025-07-17 RX ORDER — DIPHENHYDRAMINE HCL 25 MG
25 CAPSULE ORAL
OUTPATIENT
Start: 2025-07-31

## 2025-07-17 RX ORDER — EPINEPHRINE 0.3 MG/.3ML
0.3 INJECTION SUBCUTANEOUS ONCE AS NEEDED
OUTPATIENT
Start: 2025-07-31

## 2025-07-17 RX ORDER — DIPHENHYDRAMINE HCL 25 MG
25 CAPSULE ORAL
OUTPATIENT
Start: 2025-08-07

## 2025-07-17 RX ORDER — ACETAMINOPHEN 325 MG/1
650 TABLET ORAL
OUTPATIENT
Start: 2025-08-07

## 2025-07-17 RX ORDER — PROCHLORPERAZINE EDISYLATE 5 MG/ML
10 INJECTION INTRAMUSCULAR; INTRAVENOUS ONCE AS NEEDED
OUTPATIENT
Start: 2025-08-07

## 2025-07-17 RX ORDER — BORTEZOMIB 3.5 MG/1
1.3 INJECTION, POWDER, LYOPHILIZED, FOR SOLUTION INTRAVENOUS; SUBCUTANEOUS
Status: CANCELLED | OUTPATIENT
Start: 2025-07-17

## 2025-07-17 RX ORDER — EPINEPHRINE 0.3 MG/.3ML
0.3 INJECTION SUBCUTANEOUS ONCE AS NEEDED
Status: CANCELLED | OUTPATIENT
Start: 2025-07-17

## 2025-07-17 RX ORDER — PROCHLORPERAZINE EDISYLATE 5 MG/ML
10 INJECTION INTRAMUSCULAR; INTRAVENOUS ONCE AS NEEDED
OUTPATIENT
Start: 2025-07-24

## 2025-07-17 RX ORDER — ACETAMINOPHEN 325 MG/1
650 TABLET ORAL
OUTPATIENT
Start: 2025-07-31

## 2025-07-17 RX ORDER — SODIUM CHLORIDE 0.9 % (FLUSH) 0.9 %
10 SYRINGE (ML) INJECTION
OUTPATIENT
Start: 2025-07-24

## 2025-07-17 RX ORDER — DIPHENHYDRAMINE HYDROCHLORIDE 50 MG/ML
50 INJECTION, SOLUTION INTRAMUSCULAR; INTRAVENOUS ONCE AS NEEDED
OUTPATIENT
Start: 2025-08-07

## 2025-07-17 RX ORDER — DIPHENHYDRAMINE HCL 25 MG
25 CAPSULE ORAL
OUTPATIENT
Start: 2025-07-24

## 2025-07-17 RX ORDER — EPINEPHRINE 0.3 MG/.3ML
0.3 INJECTION SUBCUTANEOUS ONCE AS NEEDED
OUTPATIENT
Start: 2025-08-07

## 2025-07-17 RX ORDER — DIPHENHYDRAMINE HCL 25 MG
25 CAPSULE ORAL
Status: CANCELLED | OUTPATIENT
Start: 2025-07-17

## 2025-07-17 RX ORDER — SODIUM CHLORIDE 0.9 % (FLUSH) 0.9 %
10 SYRINGE (ML) INJECTION
OUTPATIENT
Start: 2025-08-07

## 2025-07-17 RX ORDER — DIPHENHYDRAMINE HYDROCHLORIDE 50 MG/ML
50 INJECTION, SOLUTION INTRAMUSCULAR; INTRAVENOUS ONCE AS NEEDED
OUTPATIENT
Start: 2025-07-24

## 2025-07-17 RX ORDER — DIPHENHYDRAMINE HYDROCHLORIDE 50 MG/ML
50 INJECTION, SOLUTION INTRAMUSCULAR; INTRAVENOUS ONCE AS NEEDED
OUTPATIENT
Start: 2025-07-31

## 2025-07-17 RX ORDER — SODIUM CHLORIDE 0.9 % (FLUSH) 0.9 %
10 SYRINGE (ML) INJECTION
OUTPATIENT
Start: 2025-07-31

## 2025-07-18 DIAGNOSIS — C90.00 MULTIPLE MYELOMA NOT HAVING ACHIEVED REMISSION: ICD-10-CM

## 2025-07-18 DIAGNOSIS — Z76.82 STEM CELL TRANSPLANT CANDIDATE: Primary | ICD-10-CM

## 2025-07-18 LAB
ABS NRBC COUNT: 0 X 10 3/UL (ref 0–0.01)
ABSOLUTE BASOPHIL: 0.05 X 10 3/UL (ref 0–0.22)
ABSOLUTE EOSINOPHIL: 0.33 X 10 3/UL (ref 0.04–0.54)
ABSOLUTE IMMATURE GRAN: 0.07 X 10 3/UL (ref 0–0.04)
ABSOLUTE LYMPHOCYTE: 1.24 X 10 3/UL (ref 0.86–4.75)
ABSOLUTE MONOCYTE: 0.87 X 10 3/UL (ref 0.22–1.08)
ADDITIONAL TESTING: NORMAL
ALBUMIN SERPL-MCNC: 4.1 G/DL (ref 3.5–5.2)
ALBUMIN, ELECTROPHORESIS: 3.43 G/DL (ref 3.2–5.3)
ALBUMIN/GLOB SERPL ELPH: 1.44 G/DL (ref 1–2.7)
ALBUMIN/GLOB SERPL ELPH: 2.6 {RATIO} (ref 1–2.7)
ALP ISOS SERPL LEV INH-CCNC: 85 U/L (ref 40–130)
ALPHA 1: 0.15 G/DL (ref 0.1–0.4)
ALPHA 2: 0.88 G/DL (ref 0.6–1)
ALT (SGPT): 11 U/L (ref 0–41)
ANION GAP SERPL CALC-SCNC: 16 MMOL/L (ref 8–17)
AST SERPL-CCNC: <5 U/L (ref 0–40)
BETA: 1.04 G/DL (ref 0.6–1.3)
BILIRUBIN, TOTAL: 0.19 MG/DL (ref 0–1.2)
BUN/CREAT SERPL: 19.5 (ref 6–20)
CALCIUM SERPL-MCNC: 8.1 MG/DL (ref 8.6–10.2)
CARBON DIOXIDE, CO2: 20 MMOL/L (ref 22–29)
CHLORIDE: 100 MMOL/L (ref 98–107)
CREAT SERPL-MCNC: 1.24 MG/DL (ref 0.7–1.2)
GAMMA: 0.3 G/DL (ref 0.7–1.5)
GFR ESTIMATION: 68.66 ML/MIN/1.73M2
GLOBULIN: 1.6 G/DL (ref 1.5–4.5)
GLUCOSE: 120 MG/DL (ref 74–106)
HCT VFR BLD AUTO: 39.7 % (ref 42–52)
HGB BLD-MCNC: 14.2 G/DL (ref 14–18)
IGA SERPL-MCNC: 9 MG/DL (ref 70–400)
IGG SERPL-MCNC: 353 MG/DL (ref 700–1600)
IGM: 8 MG/DL (ref 40–230)
IMMATURE GRANULOCYTES: 1.2 % (ref 0–0.5)
Lab: 0.14 G/DL
M PCPDS PRE-ANALYSIS CELL SORT: NORMAL
MCH RBC QN AUTO: 34.6 PG (ref 27–32)
MCHC RBC AUTO-ENTMCNC: 35.8 G/DL (ref 32–36)
MCV RBC AUTO: 96.8 FL (ref 80–94)
NEUTROPHILS # BLD AUTO: 3.35 X 10 3/UL (ref 2.15–7.56)
NUCLEATED RED BLOOD CELLS: 0 /100 WBC (ref 0–0.2)
PEPSIN A INTERPRETATION: ABNORMAL
PLATELET # BLD AUTO: 99 X 10 3/UL (ref 135–400)
POTASSIUM: 4 MMOL/L (ref 3.5–5.1)
PROT SNV-MCNC: 5.7 G/DL (ref 6.4–8.3)
RBC # BLD AUTO: 4.1 X 10 6/UL (ref 4.7–6.1)
RDW-SD: 50.4 FL (ref 37–54)
SMEAR REVIEW: NORMAL
SODIUM: 136 MMOL/L (ref 136–145)
SUB-OPTIMAL CRITERIA: NORMAL
UREA NITROGEN (BUN): 24.2 MG/DL (ref 6–20)
WBC # BLD: 5.91 X 10 3/UL (ref 4.3–10.8)

## 2025-07-18 NOTE — ANESTHESIA POSTPROCEDURE EVALUATION
Anesthesia Post Evaluation    Patient: Miky Carrasco    Procedure(s) Performed: Procedure(s) (LRB):  Biopsy-bone marrow (Right)    Final Anesthesia Type: general      Patient location during evaluation: GI PACU  Patient participation: Yes- Able to Participate  Level of consciousness: awake and alert  Post-procedure vital signs: reviewed and stable  Pain management: adequate  Airway patency: patent    PONV status at discharge: No PONV  Anesthetic complications: no      Cardiovascular status: blood pressure returned to baseline  Respiratory status: unassisted  Hydration status: euvolemic  Follow-up not needed.          Vitals Value Taken Time   /66 07/16/25 12:11   Temp 36.5 °C (97.7 °F) 07/16/25 11:41   Pulse 67 07/16/25 12:11   Resp 16 07/16/25 12:11   SpO2 97 % 07/16/25 12:11         Event Time   Out of Recovery 12:25:26         Pain/Blu Score: No data recorded

## 2025-07-19 LAB
M DNA/RNA EXTRACT AND HOLD RESULT: NORMAL
M DNA/RNA EXTRACTION: NORMAL
SPECIMEN SOURCE: NORMAL

## 2025-07-20 ENCOUNTER — PATIENT MESSAGE (OUTPATIENT)
Dept: HEMATOLOGY/ONCOLOGY | Facility: CLINIC | Age: 55
End: 2025-07-20
Payer: COMMERCIAL

## 2025-07-21 ENCOUNTER — HOSPITAL ENCOUNTER (OUTPATIENT)
Dept: PULMONOLOGY | Facility: CLINIC | Age: 55
Discharge: HOME | End: 2025-07-21
Payer: COMMERCIAL

## 2025-07-21 ENCOUNTER — OFFICE VISIT (OUTPATIENT)
Dept: PSYCHIATRY | Facility: CLINIC | Age: 55
End: 2025-07-21
Payer: COMMERCIAL

## 2025-07-21 ENCOUNTER — DOCUMENTATION ONLY (OUTPATIENT)
Dept: HEMATOLOGY/ONCOLOGY | Facility: CLINIC | Age: 55
End: 2025-07-21
Payer: COMMERCIAL

## 2025-07-21 ENCOUNTER — HOSPITAL ENCOUNTER (OUTPATIENT)
Dept: RADIOLOGY | Facility: HOSPITAL | Age: 55
Discharge: HOME | End: 2025-07-21
Attending: INTERNAL MEDICINE
Payer: COMMERCIAL

## 2025-07-21 ENCOUNTER — CLINICAL SUPPORT (OUTPATIENT)
Dept: HEMATOLOGY/ONCOLOGY | Facility: CLINIC | Age: 55
End: 2025-07-21
Payer: COMMERCIAL

## 2025-07-21 ENCOUNTER — HOSPITAL ENCOUNTER (OUTPATIENT)
Dept: CARDIOLOGY | Facility: HOSPITAL | Age: 55
Discharge: HOME | End: 2025-07-21
Attending: INTERNAL MEDICINE
Payer: COMMERCIAL

## 2025-07-21 ENCOUNTER — HOSPITAL ENCOUNTER (OUTPATIENT)
Dept: CARDIOLOGY | Facility: CLINIC | Age: 55
Discharge: HOME | End: 2025-07-21
Payer: COMMERCIAL

## 2025-07-21 ENCOUNTER — PATIENT MESSAGE (OUTPATIENT)
Dept: HEMATOLOGY/ONCOLOGY | Facility: CLINIC | Age: 55
End: 2025-07-21

## 2025-07-21 VITALS
BODY MASS INDEX: 27.16 KG/M2 | SYSTOLIC BLOOD PRESSURE: 135 MMHG | DIASTOLIC BLOOD PRESSURE: 66 MMHG | WEIGHT: 194 LBS | HEART RATE: 67 BPM | HEIGHT: 71 IN

## 2025-07-21 DIAGNOSIS — Z76.82 STEM CELL TRANSPLANT CANDIDATE: ICD-10-CM

## 2025-07-21 DIAGNOSIS — C90.00 MULTIPLE MYELOMA NOT HAVING ACHIEVED REMISSION: ICD-10-CM

## 2025-07-21 DIAGNOSIS — C90.00 MULTIPLE MYELOMA NOT HAVING ACHIEVED REMISSION: Primary | ICD-10-CM

## 2025-07-21 DIAGNOSIS — Z01.818 PRE-TRANSPLANT EVALUATION FOR STEM CELL TRANSPLANT: Primary | ICD-10-CM

## 2025-07-21 LAB
AORTIC SIZE INDEX (SOV): 1.6 CM/M2
AORTIC SIZE INDEX: 1.7 CM/M2
ASCENDING AORTA: 3.6 CM
AV AREA BY CONTINUOUS VTI: 2.7 CM2
AV INDEX (PROSTH): 0.71
AV LVOT MEAN GRADIENT: 2 MMHG
AV LVOT PEAK GRADIENT: 4 MMHG
AV MEAN GRADIENT: 3 MMHG
AV PEAK GRADIENT: 6 MMHG
AV VALVE AREA BY VELOCITY RATIO: 3.2 CM²
AV VALVE AREA: 2.7 CM2
AV VELOCITY RATIO: 0.83
BSA FOR ECHO PROCEDURE: 2.1 M2
CV ECHO LV RWT: 0.4 CM
DOP CALC AO PEAK VEL: 1.2 M/S
DOP CALC AO VTI: 23.3 CM
DOP CALC LVOT AREA: 3.8 CM2
DOP CALC LVOT DIAMETER: 2.2 CM
DOP CALC LVOT PEAK VEL: 1 M/S
DOP CALC LVOT STROKE VOLUME: 62.7 CM3
DOP CALCLVOT PEAK VEL VTI: 16.5 CM
E WAVE DECELERATION TIME: 267 MS
E/A RATIO: 0.9
E/E' RATIO: 8 M/S
ECHO EF ESTIMATED: 66 %
ECHO LV POSTERIOR WALL: 1 CM (ref 0.6–1.1)
FRACTIONAL SHORTENING: 36 % (ref 28–44)
GLOBAL LONGITUIDAL STRAIN: 20 %
INTERVENTRICULAR SEPTUM: 0.7 CM (ref 0.6–1.1)
IVC DIAMETER: 1.62 CM
IVRT: 128 MS
LA MAJOR: 5.1 CM
LA MINOR: 4.8 CM
LA WIDTH: 4.5 CM
LEFT ATRIUM SIZE: 4 CM
LEFT ATRIUM VOLUME INDEX MOD: 25 ML/M2
LEFT ATRIUM VOLUME INDEX: 36 ML/M2
LEFT ATRIUM VOLUME MOD: 53 ML
LEFT ATRIUM VOLUME: 76 CM3
LEFT INTERNAL DIMENSION IN SYSTOLE: 3.2 CM (ref 2.1–4)
LEFT VENTRICLE DIASTOLIC VOLUME INDEX: 56.73 ML/M2
LEFT VENTRICLE DIASTOLIC VOLUME: 118 ML
LEFT VENTRICLE MASS INDEX: 70.6 G/M2
LEFT VENTRICLE SYSTOLIC VOLUME INDEX: 19.2 ML/M2
LEFT VENTRICLE SYSTOLIC VOLUME: 40 ML
LEFT VENTRICULAR INTERNAL DIMENSION IN DIASTOLE: 5 CM (ref 3.5–6)
LEFT VENTRICULAR MASS: 146.8 G
LV LATERAL E/E' RATIO: 7.1
LV SEPTAL E/E' RATIO: 9.5
Lab: 1.9 CM/M
Lab: 2 CM/M
MV A" WAVE DURATION": 167.46 MS
MV PEAK A VEL: 0.63 M/S
MV PEAK E VEL: 0.57 M/S
OHS CV CPX PATIENT HEIGHT IN: 70.87
OHS CV RV/LV RATIO: 0.72 CM
OHS LV EJECTION FRACTION SIMPSONS BIPLANE MOD: 58 %
OHS QRS DURATION: 90 MS
OHS QTC CALCULATION: 425 MS
PISA TR MAX VEL: 2.5 M/S
PULM VEIN A" WAVE DURATION": 167.46 MS
PULM VEIN S/D RATIO: 1.85
PULMONIC VEIN PEAK A VELOCITY: 0.3 M/S
PV PEAK D VEL: 0.39 M/S
PV PEAK S VEL: 0.72 M/S
RA MAJOR: 3.88 CM
RA PRESSURE ESTIMATED: 3 MMHG
RA WIDTH: 3.73 CM
RIGHT ATRIAL AREA: 13 CM2
RIGHT VENTRICLE DIASTOLIC BASEL DIMENSION: 3.6 CM
RV TB RVSP: 6 MMHG
RV TISSUE DOPPLER FREE WALL SYSTOLIC VELOCITY 1 (APICAL 4 CHAMBER VIEW): 16.84 CM/S
SINUS: 3.4 CM
STJ: 3.3 CM
TDI LATERAL: 0.08 M/S
TDI SEPTAL: 0.06 M/S
TDI: 0.07 M/S
TRICUSPID ANNULAR PLANE SYSTOLIC EXCURSION: 2.3 CM
TV PEAK GRADIENT: 26 MMHG
TV REST PULMONARY ARTERY PRESSURE: 28 MMHG
Z-SCORE OF LEFT VENTRICULAR DIMENSION IN END DIASTOLE: -2.42
Z-SCORE OF LEFT VENTRICULAR DIMENSION IN END SYSTOLE: -1.55

## 2025-07-21 PROCEDURE — 71046 X-RAY EXAM CHEST 2 VIEWS: CPT | Mod: 26,,, | Performed by: RADIOLOGY

## 2025-07-21 PROCEDURE — 93356 MYOCRD STRAIN IMG SPCKL TRCK: CPT

## 2025-07-21 PROCEDURE — 94010 BREATHING CAPACITY TEST: CPT | Mod: S$GLB,,, | Performed by: INTERNAL MEDICINE

## 2025-07-21 PROCEDURE — 93000 ELECTROCARDIOGRAM COMPLETE: CPT | Mod: S$GLB,,, | Performed by: STUDENT IN AN ORGANIZED HEALTH CARE EDUCATION/TRAINING PROGRAM

## 2025-07-21 PROCEDURE — 99999 PR PBB SHADOW E&M-EST. PATIENT-LVL II: CPT | Mod: PBBFAC,,, | Performed by: PSYCHOLOGIST

## 2025-07-21 PROCEDURE — 99999 PR PBB SHADOW E&M-EST. PATIENT-LVL I: CPT | Mod: PBBFAC,,,

## 2025-07-21 PROCEDURE — 71046 X-RAY EXAM CHEST 2 VIEWS: CPT | Mod: TC

## 2025-07-21 PROCEDURE — 94729 DIFFUSING CAPACITY: CPT | Mod: S$GLB,,, | Performed by: INTERNAL MEDICINE

## 2025-07-21 PROCEDURE — 93306 TTE W/DOPPLER COMPLETE: CPT | Mod: 26,,, | Performed by: STUDENT IN AN ORGANIZED HEALTH CARE EDUCATION/TRAINING PROGRAM

## 2025-07-21 RX ORDER — DIPHENHYDRAMINE HCL 25 MG
25 CAPSULE ORAL ONCE AS NEEDED
OUTPATIENT
Start: 2025-08-16

## 2025-07-21 RX ORDER — POTASSIUM CHLORIDE 750 MG/1
40 TABLET, EXTENDED RELEASE ORAL ONCE AS NEEDED
OUTPATIENT
Start: 2025-08-15

## 2025-07-21 RX ORDER — ACETAMINOPHEN 325 MG/1
650 TABLET ORAL ONCE AS NEEDED
OUTPATIENT
Start: 2025-08-19

## 2025-07-21 RX ORDER — LANOLIN ALCOHOL/MO/W.PET/CERES
800 CREAM (GRAM) TOPICAL ONCE AS NEEDED
OUTPATIENT
Start: 2025-08-18

## 2025-07-21 RX ORDER — PLERIXAFOR 20 MG/ML
0.16 INJECTION, SOLUTION SUBCUTANEOUS ONCE AS NEEDED
OUTPATIENT
Start: 2025-08-21 | End: 2037-01-17

## 2025-07-21 RX ORDER — LANOLIN ALCOHOL/MO/W.PET/CERES
800 CREAM (GRAM) TOPICAL ONCE AS NEEDED
OUTPATIENT
Start: 2025-08-15

## 2025-07-21 RX ORDER — LANOLIN ALCOHOL/MO/W.PET/CERES
800 CREAM (GRAM) TOPICAL ONCE AS NEEDED
OUTPATIENT
Start: 2025-08-19

## 2025-07-21 RX ORDER — SODIUM,POTASSIUM PHOSPHATES 280-250MG
2 POWDER IN PACKET (EA) ORAL ONCE AS NEEDED
OUTPATIENT
Start: 2025-08-19

## 2025-07-21 RX ORDER — LANOLIN ALCOHOL/MO/W.PET/CERES
800 CREAM (GRAM) TOPICAL ONCE AS NEEDED
OUTPATIENT
Start: 2025-08-20

## 2025-07-21 RX ORDER — LANOLIN ALCOHOL/MO/W.PET/CERES
800 CREAM (GRAM) TOPICAL ONCE AS NEEDED
OUTPATIENT
Start: 2025-08-16

## 2025-07-21 RX ORDER — SODIUM,POTASSIUM PHOSPHATES 280-250MG
2 POWDER IN PACKET (EA) ORAL ONCE AS NEEDED
OUTPATIENT
Start: 2025-08-20

## 2025-07-21 RX ORDER — ACETAMINOPHEN 325 MG/1
650 TABLET ORAL ONCE AS NEEDED
OUTPATIENT
Start: 2025-08-20

## 2025-07-21 RX ORDER — POTASSIUM CHLORIDE 750 MG/1
40 TABLET, EXTENDED RELEASE ORAL ONCE AS NEEDED
OUTPATIENT
Start: 2025-08-20

## 2025-07-21 RX ORDER — LANOLIN ALCOHOL/MO/W.PET/CERES
800 CREAM (GRAM) TOPICAL ONCE AS NEEDED
OUTPATIENT
Start: 2025-08-21

## 2025-07-21 RX ORDER — SODIUM,POTASSIUM PHOSPHATES 280-250MG
2 POWDER IN PACKET (EA) ORAL ONCE AS NEEDED
OUTPATIENT
Start: 2025-08-16

## 2025-07-21 RX ORDER — POTASSIUM CHLORIDE 750 MG/1
40 TABLET, EXTENDED RELEASE ORAL ONCE AS NEEDED
OUTPATIENT
Start: 2025-08-21

## 2025-07-21 RX ORDER — DIPHENHYDRAMINE HCL 25 MG
25 CAPSULE ORAL ONCE AS NEEDED
OUTPATIENT
Start: 2025-08-20

## 2025-07-21 RX ORDER — PLERIXAFOR 20 MG/ML
0.16 INJECTION, SOLUTION SUBCUTANEOUS ONCE AS NEEDED
OUTPATIENT
Start: 2025-08-19 | End: 2037-01-15

## 2025-07-21 RX ORDER — POTASSIUM CHLORIDE 750 MG/1
40 TABLET, EXTENDED RELEASE ORAL ONCE AS NEEDED
OUTPATIENT
Start: 2025-08-17

## 2025-07-21 RX ORDER — DIPHENHYDRAMINE HCL 25 MG
25 CAPSULE ORAL ONCE AS NEEDED
OUTPATIENT
Start: 2025-08-17

## 2025-07-21 RX ORDER — DIPHENHYDRAMINE HCL 25 MG
25 CAPSULE ORAL ONCE AS NEEDED
OUTPATIENT
Start: 2025-08-19

## 2025-07-21 RX ORDER — POTASSIUM CHLORIDE 750 MG/1
40 TABLET, EXTENDED RELEASE ORAL ONCE AS NEEDED
OUTPATIENT
Start: 2025-08-16

## 2025-07-21 RX ORDER — SODIUM,POTASSIUM PHOSPHATES 280-250MG
2 POWDER IN PACKET (EA) ORAL ONCE AS NEEDED
OUTPATIENT
Start: 2025-08-18

## 2025-07-21 RX ORDER — POTASSIUM CHLORIDE 750 MG/1
40 TABLET, EXTENDED RELEASE ORAL ONCE AS NEEDED
OUTPATIENT
Start: 2025-08-19

## 2025-07-21 RX ORDER — DIPHENHYDRAMINE HCL 25 MG
25 CAPSULE ORAL ONCE AS NEEDED
OUTPATIENT
Start: 2025-08-15

## 2025-07-21 RX ORDER — ACETAMINOPHEN 325 MG/1
650 TABLET ORAL ONCE AS NEEDED
OUTPATIENT
Start: 2025-08-21

## 2025-07-21 RX ORDER — ACETAMINOPHEN 325 MG/1
650 TABLET ORAL ONCE AS NEEDED
OUTPATIENT
Start: 2025-08-18

## 2025-07-21 RX ORDER — SODIUM,POTASSIUM PHOSPHATES 280-250MG
2 POWDER IN PACKET (EA) ORAL ONCE AS NEEDED
OUTPATIENT
Start: 2025-08-17

## 2025-07-21 RX ORDER — PLERIXAFOR 20 MG/ML
0.16 INJECTION, SOLUTION SUBCUTANEOUS ONCE AS NEEDED
OUTPATIENT
Start: 2025-08-20 | End: 2037-01-16

## 2025-07-21 RX ORDER — SODIUM,POTASSIUM PHOSPHATES 280-250MG
2 POWDER IN PACKET (EA) ORAL ONCE AS NEEDED
OUTPATIENT
Start: 2025-08-15

## 2025-07-21 RX ORDER — PLERIXAFOR 20 MG/ML
0.16 INJECTION, SOLUTION SUBCUTANEOUS ONCE AS NEEDED
OUTPATIENT
Start: 2025-08-18 | End: 2037-01-14

## 2025-07-21 RX ORDER — SODIUM,POTASSIUM PHOSPHATES 280-250MG
2 POWDER IN PACKET (EA) ORAL ONCE AS NEEDED
OUTPATIENT
Start: 2025-08-21

## 2025-07-21 RX ORDER — LANOLIN ALCOHOL/MO/W.PET/CERES
800 CREAM (GRAM) TOPICAL ONCE AS NEEDED
OUTPATIENT
Start: 2025-08-17

## 2025-07-21 RX ORDER — DIPHENHYDRAMINE HCL 25 MG
25 CAPSULE ORAL ONCE AS NEEDED
OUTPATIENT
Start: 2025-08-18

## 2025-07-21 RX ORDER — ACETAMINOPHEN 325 MG/1
650 TABLET ORAL ONCE AS NEEDED
OUTPATIENT
Start: 2025-08-16

## 2025-07-21 RX ORDER — ACETAMINOPHEN 325 MG/1
650 TABLET ORAL ONCE AS NEEDED
OUTPATIENT
Start: 2025-08-17

## 2025-07-21 RX ORDER — POTASSIUM CHLORIDE 750 MG/1
40 TABLET, EXTENDED RELEASE ORAL ONCE AS NEEDED
OUTPATIENT
Start: 2025-08-18

## 2025-07-21 RX ORDER — ACETAMINOPHEN 325 MG/1
650 TABLET ORAL ONCE AS NEEDED
OUTPATIENT
Start: 2025-08-15

## 2025-07-21 RX ORDER — DIPHENHYDRAMINE HCL 25 MG
25 CAPSULE ORAL ONCE AS NEEDED
OUTPATIENT
Start: 2025-08-21

## 2025-07-21 NOTE — PROGRESS NOTES
"Called to lab by Lab Tech Soumya, patient "passed out" while blood was being drawn. Vitals stable, /78, Temp 99.1, HR 56 and Oxygen 97% on room air. Dr Cardoso notified. Wife at pt's side. Wife and patient stated that it happens often and is nothing new.   "

## 2025-07-21 NOTE — PROGRESS NOTES
BMT Pharmacist Evaluation      Current Outpatient Medications:     acyclovir (ZOVIRAX) 400 MG tablet, Take 1 tablet (400 mg total) by mouth 2 (two) times daily., Disp: 60 tablet, Rfl: 11    aspirin 81 MG Chew, Take 1 tablet (81 mg total) by mouth once daily., Disp: 30 tablet, Rfl: 11    calcium carbonate 260 mg calcium (650 mg) Chew, Take 1 tablet by mouth once daily., Disp: , Rfl:     cetirizine (ZYRTEC) 10 MG tablet, Take 1 tablet (10 mg total) by mouth once daily., Disp: 90 tablet, Rfl: 3    dexAMETHasone (DECADRON) 4 MG Tab, Take 10 tablets (40 mg total) by mouth every 7 days. on days 1, 8, 15, and 22 of each chemotherapy cycle. Take with food., Disp: 40 tablet, Rfl: 5    dexAMETHasone (DECADRON) 4 MG Tab, Take 10 tablets (40 mg total) by mouth every 7 days. Take with food., Disp: 40 tablet, Rfl: 5    diphenhydrAMINE (BENADRYL) 25 mg capsule, Take 25 mg by mouth., Disp: , Rfl:     EScitalopram oxalate (LEXAPRO) 10 MG tablet, Take 1 tablet (10 mg total) by mouth once daily., Disp: 30 tablet, Rfl: 0    multivitamin Tab, Take 1 tablet by mouth once daily., Disp: , Rfl:     pantoprazole (PROTONIX) 40 MG tablet, Take 1 tablet (40 mg total) by mouth once daily., Disp: 90 tablet, Rfl: 1    REVLIMID 25 mg Cap, TAKE 1 CAPSULE ONCE DAILY FOR 21 DAYS ON THEN 7 DAYS OFF, Disp: 21 capsule, Rfl: 0    sulfamethoxazole-trimethoprim 400-80mg (BACTRIM) 400-80 mg per tablet, Take 1 tablet by mouth once daily. for 360 doses, Disp: 30 tablet, Rfl: 11    fluticasone propionate (FLONASE) 50 mcg/actuation nasal spray, 1 spray (50 mcg total) by Each Nostril route once daily. (Patient not taking: Reported on 7/21/2025), Disp: 18.2 g, Rfl: 3    ondansetron (ZOFRAN) 8 MG tablet, Take 1 tablet (8 mg total) by mouth every 8 (eight) hours as needed. (Patient not taking: Reported on 7/21/2025), Disp: 60 tablet, Rfl: 6    prochlorperazine (COMPAZINE) 5 MG tablet, Take 2 tablets (10 mg total) by mouth every 6 (six) hours as needed for Nausea.  (Patient not taking: Reported on 7/21/2025), Disp: 20 tablet, Rfl: 5      Review of patient's allergies indicates:   Allergen Reactions    Penicillins Rash    Baclofen Other (See Comments)     Can't sit still, causes body to twitch    Bacitracin Rash         Estimated Glomerular Filtration Rate: 68.7 mL/min/1.73m2 (A) (by CKD-EPI based on SCr of 1.24 mg/dL (H)).  Estimated Glomerular Filtration Rate: 68.7 mL/min/1.73m2 (A) (by CKD-EPI based on SCr of 1.24 mg/dL (H)).          Medication adherence: He acknowledges that he needs assistance to remember when to take his medications. He has a system in place with his wife. He uses a pillbox, which helps him.    Medication-related problems:   Revlimid- rashes     Planned conditioning regimen:  Melphalan 200 on Day -1     Antimicrobial Prophylaxis:  Acyclovir starting on Day -1  Levofloxacin starting on Day -1  Fluconazole starting on Day -1  Bactrim starting on Day +30    Growth Factor Support:  Neupogen starting on Day +7      Caregiver: wife  Post-transplant discharge plans: Kristal perez     Notes:  Reviewed and reconciled the medication list with the patient and wife. Patient was able to confirm all medications he currently takes including schedule and indication. Patient demonstrates excellent medication adherence and understands the importance of this through the transplant process.     Reviewed the planned high-dose chemotherapy regimen, including schedule and possible side effects. Provided the patient with drug information handouts for chemotherapy. Reviewed possible side effects of transplant including: neutropenia, thrombocytopenia, anemia, infection, infusion reactions, nausea/vomiting, mucositis, loss of appetite, taste changes, diarrhea,hair loss, liver and/or renal dysfunction. Also discussed the rare side effect of neurotoxicity. Reviewed prophylactic antimicrobials, as well as prophylactic and as needed antiemetics. Encouraged the patient to report all  possible side effects/new symptoms and to ask for supportive care medications if needed. Patient verbalized understanding and all questions were answered.    Pharmacy Notes/Proposed recommendations:  - Upon admission, hold aspirin, calcium, diphenhydramine(before his shots), and Bactrim. Diphenhydramine and aspirin should not be continued at discharge, while calcium can be safely restarted at that time. Bactrim will be initiated on day +30.     - I advised the patient to bring all medications to facilitate a smooth transition of care, as he will be staying at the [facility].     - He is currently taking aspirin only as part of his treatment.     - There is no history of shingles, invasive fungal infections, or C. difficile.     - He is still receiving treatment, which is why I left Revlimid and dexamethasone in his medication list; please discontinue these upon admission.     The patient has a history of GERD, so please continue pantoprazole at discharge.     For the pharmacy team: kindly check the eGFR before determining the melphalan dose.     The patient understands that he will need to take a higher dose of acyclovir for one year following the transplant and will start taking Bactrim on day +30, continuing for six months post-transplant.    Drug Interactions: No interactions between new oncology treatment plan and home medication list requiring intervention           The patient demonstrates fair medication adherence and understanding of the chemotherapy and transplant plan. BMT/Hematology Oncology PharmD will continue to follow the patient while admitted to the inpatient unit.     Emily Frost, PharmD  Clinical Pharmacy Specialist, Bone Marrow Transplant/Hematology  Spectra link: [phone]

## 2025-07-23 DIAGNOSIS — Z76.82 STEM CELL TRANSPLANT CANDIDATE: Primary | ICD-10-CM

## 2025-07-23 DIAGNOSIS — C90.00 MULTIPLE MYELOMA NOT HAVING ACHIEVED REMISSION: ICD-10-CM

## 2025-07-24 DIAGNOSIS — Z76.82 STEM CELL TRANSPLANT CANDIDATE: Primary | ICD-10-CM

## 2025-08-01 ENCOUNTER — PATIENT MESSAGE (OUTPATIENT)
Dept: SURGERY | Facility: CLINIC | Age: 55
End: 2025-08-01
Payer: COMMERCIAL

## 2025-08-01 ENCOUNTER — TELEPHONE (OUTPATIENT)
Dept: HEMATOLOGY/ONCOLOGY | Facility: CLINIC | Age: 55
End: 2025-08-01

## 2025-08-04 NOTE — PROGRESS NOTES
"Oncology Nutrition Assessment   Evaluation and Education Note    Miky Carrasco  1970    Transplant History:   Primary oncologist:   Primary oncologic diagnosis: MM  Transplant type and date: Autologous SCT on 08/25/25  Conditioning Regimen: n/a  Immediate post-transplant complications: n/a    Social History:   Caregiver:   Post-transplant discharge plans: [facility] Pre-Transplant Nutrition History:   Appetite/Intake:  Regular Diet, Eating 2-3 meals daily. Snacking all the time. No ONS. Drinking water with flavoring 3-40 cups of water.   Cultural/Spiritual/Personal Preferences: No Preferences    Living Situation: Lives with spouse  Who: Shops for Groceries? Patient and Spouse  Who: Prepare meals? Spouse  Eating out: 0-1x/week.  Allergies: Penicillins, Baclofen, and Bacitracin     PMHx: Past Medical History[1]     Nutrition Assessment    Anthropometrics:   Weight:   Wt Readings from Last 10 Encounters:   07/21/25 88 kg (194 lb 0.1 oz)   07/16/25 88.3 kg (194 lb 10.7 oz)   07/03/25 89.4 kg (197 lb)   06/20/25 89.2 kg (196 lb 11.2 oz)   06/06/25 89.6 kg (197 lb 8 oz)   05/29/25 91 kg (200 lb 9.6 oz)   05/22/25 91.1 kg (200 lb 12.8 oz)   05/20/25 91.6 kg (202 lb)   05/09/25 91.2 kg (201 lb)   04/17/25 92 kg (202 lb 12.8 oz)                          Usual BW: 210 lb.  Ht Readings from Last 1 Encounters:   07/21/25 5' 10.87" (1.8 m)      BMI Readings from Last 1 Encounters:   07/21/25 27.16 kg/m²     Estimated body surface area is 2.1 meters squared as calculated from the following:    Height as of 7/21/25: 5' 10.87" (1.8 m).    Weight as of 7/21/25: 88 kg (194 lb 0.1 oz).         Encounter Notes:  Miky Carrasco is a 54 y.o. male being seen for nutrition evaluation and education prior to Autologous SCT. Patient presents to clinic accompanied by wife. Weight loss of 16 lbs in ~8 months noted.  Likely related to muscle loss due to discontinuing working in Feb 2025 per patient. Current nutrition " impact symptoms listed below.     Nutrition Impact Symptoms    [] No nutritional concerns at current  [] Poor Appetite   [] Weight loss   [] Nausea   [] Vomiting   [] Diarrhea   [] Constipation   [] Early Satiety [] Change in smell   [x] Change in taste - cardboard (can taste fruit, spice, and salt)  [x] Dry Mouth   [] Thick saliva   [] Dysphagia   [] Difficulty chewing  [] Mucositis  [] Indigestion  [] Gas/Bloating  [] Reflux - Protonix     [x] Fatigue     [] other, please specify- n/a     Current Medications:  Current Outpatient Medications   Medication Instructions    acyclovir (ZOVIRAX) 400 mg, Oral, 2 times daily    aspirin 81 mg, Oral, Daily    calcium carbonate 260 mg calcium (650 mg) Chew 1 tablet, Daily    cetirizine (ZYRTEC) 10 mg, Oral, Daily    dexAMETHasone (DECADRON) 40 mg, Oral, Every 7 days, on days 1, 8, 15, and 22 of each chemotherapy cycle. Take with food.    dexAMETHasone (DECADRON) 40 mg, Oral, Every 7 days, Take with food.    diphenhydrAMINE (BENADRYL) 25 mg    EScitalopram oxalate (LEXAPRO) 10 mg, Oral, Daily    fluticasone propionate (FLONASE) 50 mcg, Each Nostril, Daily    multivitamin Tab 1 tablet, Daily    ondansetron (ZOFRAN) 8 mg, Oral, Every 8 hours PRN    pantoprazole (PROTONIX) 40 mg, Oral, Daily    prochlorperazine (COMPAZINE) 10 mg, Oral, Every 6 hours PRN    REVLIMID 25 mg Cap TAKE 1 CAPSULE ONCE DAILY FOR 21 DAYS ON THEN 7 DAYS OFF    sulfamethoxazole-trimethoprim 400-80mg (BACTRIM) 400-80 mg per tablet 1 tablet, Oral, Daily      Labs: Reviewed        Nutrition Diagnosis    Nutrition Problem: Food and nutrition related knowledge deficit  Etiology (related to): lack of prior need for nutrition education  Signs/Symptoms (as evidenced by): referral for SCT evaluation    Nutrition Intervention    Nutrition Prescription  N/A due to pre-transplant education/evaluation encounter    Recommendations:   Review details of [facility] Nutrition Guide review during visit   Encouraged  safe food practices.    Materials Provided/Reviewed: [facility] Nutrition Guide, none  Barriers to Learning: none identified  Patient and/or Family Verbalizes understanding: yes     Nutrition Monitoring and Evaluation    Monitor: energy intake, weight, diet education needs , and symptom management     Follow up upon RTC post Autologous SCT Day +22 (09/16/25)    Communication to referring provider/care team: Note available in chart.     Consultation Time: 30 Minutes    Dany ZABALA, , LDN  [facility]  Board Certified Specialist in Oncology Nutrition   [facility]  [phone]          [1]   Past Medical History:  Diagnosis Date    CKD (chronic kidney disease)     History of colonic polyps     Multiple myeloma not having achieved remission     TMJ (dislocation of temporomandibular joint)

## 2025-08-07 ENCOUNTER — OFFICE VISIT (OUTPATIENT)
Dept: HEMATOLOGY/ONCOLOGY | Facility: CLINIC | Age: 55
End: 2025-08-07
Payer: COMMERCIAL

## 2025-08-07 ENCOUNTER — CLINICAL SUPPORT (OUTPATIENT)
Dept: HEMATOLOGY/ONCOLOGY | Facility: CLINIC | Age: 55
End: 2025-08-07
Payer: COMMERCIAL

## 2025-08-07 ENCOUNTER — LAB VISIT (OUTPATIENT)
Dept: LAB | Facility: HOSPITAL | Age: 55
End: 2025-08-07
Payer: COMMERCIAL

## 2025-08-07 ENCOUNTER — DOCUMENTATION ONLY (OUTPATIENT)
Dept: LAB | Facility: HOSPITAL | Age: 55
End: 2025-08-07

## 2025-08-07 VITALS
HEIGHT: 71 IN | BODY MASS INDEX: 27.75 KG/M2 | DIASTOLIC BLOOD PRESSURE: 81 MMHG | TEMPERATURE: 98 F | HEART RATE: 62 BPM | WEIGHT: 198.19 LBS | SYSTOLIC BLOOD PRESSURE: 134 MMHG | OXYGEN SATURATION: 94 %

## 2025-08-07 DIAGNOSIS — Z71.3 NUTRITIONAL COUNSELING: Primary | ICD-10-CM

## 2025-08-07 DIAGNOSIS — Z76.82 STEM CELL TRANSPLANT CANDIDATE: ICD-10-CM

## 2025-08-07 DIAGNOSIS — C90.01 MULTIPLE MYELOMA IN REMISSION: ICD-10-CM

## 2025-08-07 DIAGNOSIS — C90.00 MULTIPLE MYELOMA NOT HAVING ACHIEVED REMISSION: ICD-10-CM

## 2025-08-07 DIAGNOSIS — Z76.82 STEM CELL TRANSPLANT CANDIDATE: Primary | ICD-10-CM

## 2025-08-07 DIAGNOSIS — D84.81 IMMUNODEFICIENCY DUE TO CONDITIONS CLASSIFIED ELSEWHERE: ICD-10-CM

## 2025-08-07 PROCEDURE — 36415 COLL VENOUS BLD VENIPUNCTURE: CPT

## 2025-08-07 PROCEDURE — 99999 PR PBB SHADOW E&M-EST. PATIENT-LVL III: CPT | Mod: PBBFAC,,, | Performed by: INTERNAL MEDICINE

## 2025-08-07 PROCEDURE — 80307 DRUG TEST PRSMV CHEM ANLYZR: CPT

## 2025-08-08 NOTE — PROGRESS NOTES
Section of Hematology and Stem Cell Transplantation  Follow Up Visit     Date of visit: 8/7/25  Visit diagnosis: Stem cell transplant candidate [Z76.82]    Oncologic History:     Primary Oncologic Diagnosis: IgG kappa multiple myeloma, standard risk    7/2024: First labs in [facility] showing renal dysfunction with creatinine 1.9. He was referred to Dr. Saunders for further evaluation.   7/2024: Baseline myeloma labs - K , L FLC 8.10, FLC ratio 86; IgG 881, IgA 61, IgM 24; ; B2M 3.6; Cr 1.95, Ca 9.2, Hgb 17.6.  8/2/24: Bone marrow biopsy showed increased number of plasma cells (20-25%). Congo red negative. Diploid karyotype. FISH not available.   9/27/24: Cycle 1 day 1 of CyBorD.  12/20/24: Bone marrow biopsy revealed persistent plasma cell neoplasm (30%). FISH normal.   12/27/24: Cycle 4 day 1 of CyBorD.  3/14/2025: Cycle 1 day 1 Dina-VRD.  7/16/25: Pre-transplant eval consistent with VGPR. Bone marrow biopsy MRD+ CR (MRD by flow 0.0399%). M-spike 0.13 g/dL. FLC ratio 11. PET/CT no new hypermetabolic lesions. Right sided pulmonary nodules noted.     History of Present Ilness:   Miky Carrasco (Miky) is a pleasant 54 y.o.male with multiple myeloma who presents for follow up. He is doing very well. He is eager to proceed with transplant. Consent signed today.     PAST MEDICAL HISTORY:   Past Medical History:   Diagnosis Date    CKD (chronic kidney disease)     History of colonic polyps     Multiple myeloma not having achieved remission     TMJ (dislocation of temporomandibular joint)        PAST SURGICAL HISTORY:   Past Surgical History:   Procedure Laterality Date    BONE BIOPSY N/A     BONE MARROW BIOPSY Right 7/16/2025    Procedure: Biopsy-bone marrow;  Surgeon: Tereso Cardoso MD;  Location: [address];  Service: Oncology;  Laterality: Right;       PAST SOCIAL HISTORY:  Social History     Tobacco Use    Smoking status: Never     Passive exposure: Current    Smokeless tobacco:  Former     Types: Chew     Quit date: 02/2025   Substance Use Topics    Alcohol use: Not Currently    Drug use: Not Currently     Types: Benzodiazepines       FAMILY HISTORY:  Family History   Problem Relation Name Age of Onset    Hypertension Mother      Diabetes Mother      Skin cancer Mother      Hypertension Father      Lymphoma Sister      Ulcerative colitis Neg Hx      Crohn's disease Neg Hx      Pancreatic cancer Neg Hx      Liver disease Neg Hx      Stomach cancer Neg Hx      Colon cancer Neg Hx      Throat cancer Neg Hx      Esophageal cancer Neg Hx         CURRENT MEDICATIONS:   Current Outpatient Medications   Medication Sig    acyclovir (ZOVIRAX) 400 MG tablet Take 1 tablet (400 mg total) by mouth 2 (two) times daily.    aspirin 81 MG Chew Take 1 tablet (81 mg total) by mouth once daily.    calcium carbonate 260 mg calcium (650 mg) Chew Take 1 tablet by mouth once daily.    cetirizine (ZYRTEC) 10 MG tablet Take 1 tablet (10 mg total) by mouth once daily.    dexAMETHasone (DECADRON) 4 MG Tab Take 10 tablets (40 mg total) by mouth every 7 days. on days 1, 8, 15, and 22 of each chemotherapy cycle. Take with food.    dexAMETHasone (DECADRON) 4 MG Tab Take 10 tablets (40 mg total) by mouth every 7 days. Take with food.    diphenhydrAMINE (BENADRYL) 25 mg capsule Take 25 mg by mouth.    EScitalopram oxalate (LEXAPRO) 10 MG tablet Take 1 tablet (10 mg total) by mouth once daily.    multivitamin Tab Take 1 tablet by mouth once daily.    pantoprazole (PROTONIX) 40 MG tablet Take 1 tablet (40 mg total) by mouth once daily.    REVLIMID 25 mg Cap TAKE 1 CAPSULE ONCE DAILY FOR 21 DAYS ON THEN 7 DAYS OFF    sulfamethoxazole-trimethoprim 400-80mg (BACTRIM) 400-80 mg per tablet Take 1 tablet by mouth once daily. for 360 doses    fluticasone propionate (FLONASE) 50 mcg/actuation nasal spray 1 spray (50 mcg total) by Each Nostril route once daily. (Patient not taking: Reported on 8/7/2025)    ondansetron (ZOFRAN) 8 MG  tablet Take 1 tablet (8 mg total) by mouth every 8 (eight) hours as needed. (Patient not taking: Reported on 8/7/2025)    prochlorperazine (COMPAZINE) 5 MG tablet Take 2 tablets (10 mg total) by mouth every 6 (six) hours as needed for Nausea. (Patient not taking: Reported on 8/7/2025)     No current facility-administered medications for this visit.       ALLERGIES:   Review of patient's allergies indicates:   Allergen Reactions    Penicillins Rash    Baclofen Other (See Comments)     Can't sit still, causes body to twitch    Bacitracin Rash       Review of Systems:     Pertinent positives and negatives included in the HPI. Otherwise a complete review of systems is negative.    Physical Exam:     Vitals:    08/07/25 0837   BP: 134/81   Pulse: 62   Temp: 97.5 °F (36.4 °C)     General: Appears well, NAD  HEENT: MMM, no OP lesions  Neck: Supple, no LAD  Pulmonary: CTAB, no increased work of breathing, no W/R/C  Cardiovascular: S1S2 normal, RRR, no M/R/G  Abdominal: Soft, NT, ND, BS+, no HSM  Extremities: No C/C/E  Neurological: AAOx4, grossly normal, no focal deficits  Dermatologic: No appreciable rashes or lesions    ECOG Performance Status: (foot note - ECOG PS provided by Eastern Cooperative Oncology Group) 0 - Asymptomatic    Karnofsky Performance Score:  100%- Normal, No Complaints, No Evidence of Disease    Labs:   Lab Results   Component Value Date    WBC 4.62 07/21/2025    RBC 3.99 (L) 07/21/2025    HGB 13.3 (L) 07/21/2025    HCT 38.8 (L) 07/21/2025    MCV 97 07/21/2025    MCH 33.3 (H) 07/21/2025    MCHC 34.3 07/21/2025    RDW 14.2 07/21/2025    PLT 89 (L) 07/21/2025    MPV 12.0 07/21/2025    GRAN 4.6 01/16/2025    GRAN 65.0 01/16/2025    LYMPH 23.4 07/21/2025    LYMPH 1.08 07/21/2025    MONO 11.9 07/21/2025    MONO 0.55 07/21/2025    EOS 13.6 (H) 07/21/2025    EOS 0.63 (H) 07/21/2025    BASO 0.04 01/16/2025    EOSINOPHIL 1.7 01/16/2025    BASOPHIL 0.6 07/21/2025    BASOPHIL 0.03 07/21/2025       CMP  Sodium    Date Value Ref Range Status   07/21/2025 134 (L) 136 - 145 mmol/L Final   07/15/2025 136 136 - 145 mmol/L Final     Potassium   Date Value Ref Range Status   07/21/2025 3.7 3.5 - 5.1 mmol/L Final   07/15/2025 4.0 3.5 - 5.1 mmol/L Final     Chloride   Date Value Ref Range Status   07/21/2025 101 95 - 110 mmol/L Final   07/15/2025 100 98 - 107 mmol/L Final     CO2   Date Value Ref Range Status   07/21/2025 11 (L) 23 - 29 mmol/L Final   07/15/2025 20 (L) 22 - 29 mmol/L Final     Glucose   Date Value Ref Range Status   07/21/2025 225 (H) 70 - 110 mg/dL Final   02/27/2025 191 (H) 70 - 110 mg/dL Final     BUN   Date Value Ref Range Status   07/21/2025 24 (H) 6 - 20 mg/dL Final   07/15/2025 24.2 (H) 6 - 20 mg/dL Final     Creatinine   Date Value Ref Range Status   07/21/2025 1.9 (H) 0.5 - 1.4 mg/dL Final   07/15/2025 1.24 (H) 0.70 - 1.20 mg/dL Final     Comment:     Recommend repeat creatinine within 90 days.     Calcium   Date Value Ref Range Status   07/21/2025 8.4 (L) 8.7 - 10.5 mg/dL Final   07/15/2025 8.1 (L) 8.6 - 10.2 mg/dL Final     Protein Total   Date Value Ref Range Status   07/21/2025 7.4 6.0 - 8.4 gm/dL Final     Comment:     *Specimen lipemic.  Result may be interfered by lipemia   07/21/2025 6.8 6.0 - 8.4 gm/dL Final     Comment:     *Specimen lipemic.  Result may be interfered by lipemia  Serum protein electrophoresis and immunofixation results should be interpreted in clinical context in that some therapeutic agents can result in false positive results (example, daratumumab). Correlation with the patient's therapeutic regimen is required.     Total Protein   Date Value Ref Range Status   02/27/2025 6.3 (L) 6.4 - 8.2 g/dL Final     Albumin   Date Value Ref Range Status   07/21/2025 4.1 3.5 - 5.2 g/dL Final   07/15/2025 4.1 3.5 - 5.2 g/dL Final     Total Bilirubin   Date Value Ref Range Status   02/27/2025 0.4 0.0 - 1.0 mg/dL Final     Bilirubin Total   Date Value Ref Range Status   07/21/2025 0.4 0.1 -  1.0 mg/dL Final     Comment:     For infants and newborns, interpretation of results should be based   on gestational age, weight and in agreement with clinical   observations.    Premature Infant recommended reference ranges:   0-24 hours:  <8.0 mg/dL   24-48 hours: <12.0 mg/dL   3-5 days:    <15.0 mg/dL   6-29 days:   <15.0 mg/dL     Alkaline Phosphatase   Date Value Ref Range Status   02/27/2025 101 46 - 116 U/L Final     ALP   Date Value Ref Range Status   07/21/2025 83 40 - 150 unit/L Final     AST   Date Value Ref Range Status   07/21/2025 16 11 - 45 unit/L Final   07/15/2025 <5.0 0 - 40 U/L Final     ALT   Date Value Ref Range Status   07/21/2025 32 10 - 44 unit/L Final   02/27/2025 41 12 - 78 U/L Final     Comment:     specimen grossly lipemic     Anion Gap   Date Value Ref Range Status   07/21/2025 22 (H) 8 - 16 mmol/L Final   07/15/2025 16.0 8.0 - 17.0 mmol/L Final     Comment:     NOTE  Testing performed at:  [facility], [address] [place] [address] CLIA #:[id number]           Pathology:  Reviewed     Assessment and Plan:   Miky Carrasco (Miky) is a pleasant 54 y.o.male with multiple myeloma here to discuss transplant.     Autologous stem cell transplant candidate:  We discussed the role for autologous stem cell transplantation in multiple myeloma as a means to improve progression free survival (not curative) and provide prolonged disease control. We had an extensive discussion about the rationale, logistics, risks, and benefits. We reviewed the requirement to stay in the [place] area for 30 days with a caregiver at all times. We discussed the risks, including infection, organ toxicity, relapse of disease, and secondary cancers. We reviewed the need for maintenance therapy after day 100. He transitioned to Dina-VRD in 3/2025, and he has achieved a VGPR. HCT-CI of 0. Consent signed today.  - Coordinator: Kayla Ewing  - Conditioning Regimen: Melphalan 200 mg/m2  - Cell  Dose: 5-10 x 10^6 CD34/kg  - Maintenance: Likely Rev 10mg x21 of 28 days  - Lodging: [facility]  - Caregiver: wife    IgG kappa multiple myeloma, standard risk, R-ISS stage II:  Patient was referred to hematology oncology in 7/2024 due to worsening renal function. He was worked up for multple myeloma and his bone marrow biopsy on 8/2/24 showed increased number of plasma cells (20-25%). His marrow and the evidence of worsening renal failure, along with elevated kappa free light chains and kappa/lambda free light chain ratio findings were consistent with active multiple myeloma. He received 6 cycles of CyBorD and achieved a stable disease/partial response. In 3/2025, he transitioned to Dina-VRD, and after 5 cycles he achieved VGPR.  - Proceed with autoSCT as above.    Immunodeficiency   Patient is currently on bactrim and acyclovir.    Orders/Follow Up:           Route Chart for Scheduling    BMT Chart Routing      Follow up with physician . Per henny   Follow up with PATRICIA    Provider visit type    Infusion scheduling note    Injection scheduling note    Labs    Imaging    Pharmacy appointment    Other referrals                    Treatment Plan Information   OP Myeloma DINA-VRD daratumumab bortezomib (WEEKLY) lenalidomide dexAMETHasone Q4W Sara De León NP   Associated diagnosis: Multiple myeloma not having achieved remission  Beta-2-microglobulin (mg/L): 3.6, Albumin (g/dL): 4.1, ISS: Stage II, High-risk cytogenetics: Absent, LDH: Normal noted on 8/15/2024   Line of treatment: Second Line  Treatment Goal: Curative     Upcoming Treatment Dates - OP Myeloma IDNA-VRD daratumumab bortezomib (WEEKLY) lenalidomide dexAMETHasone Q4W    7/24/2025       Pre-Medications       acetaminophen tablet 650 mg       diphenhydrAMINE capsule 25 mg       Chemotherapy       bortezomib (VELCADE) injection 2.75 mg  7/31/2025       Pre-Medications       acetaminophen tablet 650 mg       diphenhydrAMINE capsule 25 mg        Chemotherapy       bortezomib (VELCADE) injection 2.75 mg       daratumumab-hyaluronidase-fihj (DARZALEX FASPRO) subcutaneous injection 1,800 mg 15 mL  8/7/2025       Pre-Medications       acetaminophen tablet 650 mg       diphenhydrAMINE capsule 25 mg       Chemotherapy       bortezomib (VELCADE) injection 2.75 mg    Supportive Plan Information  OP STEM CELL MOBILIZATION (FILGRASTIM/PLERIXAFOR) Alondra Gonzalez NP   Associated Diagnosis: Multiple myeloma not having achieved remission  Beta-2-microglobulin (mg/L): 3.6, Albumin (g/dL): 4.1, ISS: Stage II, High-risk cytogenetics: Absent, LDH: Normal noted on 8/15/2024  Associated Diagnosis: Stem cell transplant candidate   noted on 7/21/2025   Line of treatment: Supportive Care   Treatment goal: Supportive     Upcoming Treatment Dates - OP STEM CELL MOBILIZATION (FILGRASTIM/PLERIXAFOR)    8/15/2025       Supportive Care       filgrastim (NEUPOGEN) injection 480 mcg/1.6 ml  8/16/2025       Supportive Care       filgrastim (NEUPOGEN) injection 480 mcg/1.6 ml  8/17/2025       Supportive Care       filgrastim (NEUPOGEN) injection 480 mcg/1.6 ml  8/18/2025       Supportive Care       filgrastim (NEUPOGEN) injection 480 mcg/1.6 ml    Therapy Plan Information  DENOSUMAB (XGEVA) Q4W for Multiple myeloma not having achieved remission, noted on 8/15/2024  Medications  denosumab (XGEVA) solution 120 mg  120 mg, Subcutaneous, Every 4 weeks      No therapy plan of the specified type found.    No therapy plan of the specified type found.        Total time of this visit was 40 minutes, including time spent face to face with patient and/or via video/audio, and also in preparing for today's visit for MDM and documentation. (Medical Decision Making, including consideration of possible diagnoses, management options, complex medical record review, review of diagnostic tests and information, consideration and discussion of significant complications based on comorbidities, and  discussion with providers involved with the care of the patient). Greater than 50% was spent face to face with the patient counseling and coordinating care.  Visit today included increased complexity associated with the care of the episodic problem stem cell transplant candidate addressed and managing the longitudinal care of the patient due to the serious and/or complex managed problem(s) multiple myeloma.    Edson Cardoso MD  Malignant Hematology, Stem Cell Transplant, and Cellular Therapy  [facility]  [facility] [place]

## 2025-08-09 DIAGNOSIS — C90.00 MULTIPLE MYELOMA NOT HAVING ACHIEVED REMISSION: ICD-10-CM

## 2025-08-09 LAB
W AMPHETAMINE, SERUM, QUALITATIVE: NEGATIVE
W BARBITURATE, SERUM, QUALITATIVE: NEGATIVE
W BENZODIAZEPINE, SERUM, QUALITATIVE: NEGATIVE
W COCAINE, SERUM, QUALITATIVE: NEGATIVE
W ETHANOL, SERUM: NEGATIVE
W METHADONE, SERUM, QUALITATIVE: NEGATIVE
W OPIATE, SERUM, QUALITATIVE: NEGATIVE
W PHENCYCLIDINE, SERUM, QUALITATIVE: NEGATIVE
W PROPOXYPHENE, SERUM, QUALITATIVE: NEGATIVE
W THC, SERUM, QUALITATIVE: NEGATIVE

## 2025-08-11 RX ORDER — ESCITALOPRAM OXALATE 10 MG/1
10 TABLET ORAL
Qty: 30 TABLET | Refills: 0 | Status: SHIPPED | OUTPATIENT
Start: 2025-08-11

## 2025-08-15 ENCOUNTER — INFUSION (OUTPATIENT)
Dept: INFUSION THERAPY | Facility: HOSPITAL | Age: 55
End: 2025-08-15
Payer: COMMERCIAL

## 2025-08-15 ENCOUNTER — TELEPHONE (OUTPATIENT)
Dept: SURGERY | Facility: CLINIC | Age: 55
End: 2025-08-15
Payer: COMMERCIAL

## 2025-08-15 DIAGNOSIS — C90.00 MULTIPLE MYELOMA NOT HAVING ACHIEVED REMISSION: Primary | ICD-10-CM

## 2025-08-15 DIAGNOSIS — Z76.82 STEM CELL TRANSPLANT CANDIDATE: ICD-10-CM

## 2025-08-15 PROCEDURE — 96372 THER/PROPH/DIAG INJ SC/IM: CPT

## 2025-08-15 PROCEDURE — 63600175 PHARM REV CODE 636 W HCPCS: Mod: JZ,TB

## 2025-08-15 RX ADMIN — FILGRASTIM 960 MCG: 480 INJECTION, SOLUTION INTRAVENOUS; SUBCUTANEOUS at 08:08

## 2025-08-16 ENCOUNTER — INFUSION (OUTPATIENT)
Dept: INFUSION THERAPY | Facility: HOSPITAL | Age: 55
End: 2025-08-16
Payer: COMMERCIAL

## 2025-08-16 DIAGNOSIS — C90.00 MULTIPLE MYELOMA NOT HAVING ACHIEVED REMISSION: Primary | ICD-10-CM

## 2025-08-16 DIAGNOSIS — Z76.82 STEM CELL TRANSPLANT CANDIDATE: ICD-10-CM

## 2025-08-16 PROCEDURE — 96372 THER/PROPH/DIAG INJ SC/IM: CPT

## 2025-08-16 PROCEDURE — 63600175 PHARM REV CODE 636 W HCPCS: Mod: JZ,TB

## 2025-08-16 RX ADMIN — FILGRASTIM 960 MCG: 480 INJECTION, SOLUTION INTRAVENOUS; SUBCUTANEOUS at 08:08

## 2025-08-17 ENCOUNTER — INFUSION (OUTPATIENT)
Dept: INFUSION THERAPY | Facility: HOSPITAL | Age: 55
End: 2025-08-17
Payer: COMMERCIAL

## 2025-08-17 VITALS — WEIGHT: 198.19 LBS | HEIGHT: 70 IN | BODY MASS INDEX: 28.37 KG/M2

## 2025-08-17 DIAGNOSIS — Z76.82 STEM CELL TRANSPLANT CANDIDATE: ICD-10-CM

## 2025-08-17 DIAGNOSIS — C90.00 MULTIPLE MYELOMA NOT HAVING ACHIEVED REMISSION: Primary | ICD-10-CM

## 2025-08-17 PROCEDURE — 96372 THER/PROPH/DIAG INJ SC/IM: CPT

## 2025-08-17 PROCEDURE — 63600175 PHARM REV CODE 636 W HCPCS: Mod: JZ,TB

## 2025-08-17 RX ADMIN — FILGRASTIM 960 MCG: 480 INJECTION, SOLUTION INTRAVENOUS; SUBCUTANEOUS at 07:08

## 2025-08-18 ENCOUNTER — ANESTHESIA EVENT (OUTPATIENT)
Dept: SURGERY | Facility: HOSPITAL | Age: 55
End: 2025-08-18
Payer: COMMERCIAL

## 2025-08-18 ENCOUNTER — HOSPITAL ENCOUNTER (OUTPATIENT)
Facility: HOSPITAL | Age: 55
Discharge: HOME | End: 2025-08-18
Attending: SURGERY | Admitting: SURGERY
Payer: COMMERCIAL

## 2025-08-18 ENCOUNTER — ANESTHESIA (OUTPATIENT)
Dept: SURGERY | Facility: HOSPITAL | Age: 55
End: 2025-08-18
Payer: COMMERCIAL

## 2025-08-18 ENCOUNTER — INFUSION (OUTPATIENT)
Dept: INFUSION THERAPY | Facility: HOSPITAL | Age: 55
End: 2025-08-18
Payer: COMMERCIAL

## 2025-08-18 VITALS
TEMPERATURE: 98 F | DIASTOLIC BLOOD PRESSURE: 82 MMHG | HEART RATE: 61 BPM | SYSTOLIC BLOOD PRESSURE: 132 MMHG | BODY MASS INDEX: 27.62 KG/M2 | RESPIRATION RATE: 18 BRPM | WEIGHT: 197.31 LBS | HEIGHT: 71 IN | OXYGEN SATURATION: 96 %

## 2025-08-18 DIAGNOSIS — Z76.82 STEM CELL TRANSPLANT CANDIDATE: ICD-10-CM

## 2025-08-18 DIAGNOSIS — C90.00 MULTIPLE MYELOMA NOT HAVING ACHIEVED REMISSION: ICD-10-CM

## 2025-08-18 DIAGNOSIS — C90.00 MULTIPLE MYELOMA NOT HAVING ACHIEVED REMISSION: Primary | ICD-10-CM

## 2025-08-18 DIAGNOSIS — E83.39 HYPOPHOSPHATEMIA: Primary | ICD-10-CM

## 2025-08-18 LAB
ABSOLUTE NEUTROPHIL MANUAL (OHS): 25.4 K/UL (ref 1.8–7.7)
ALBUMIN SERPL BCP-MCNC: 3.9 G/DL (ref 3.5–5.2)
ALP SERPL-CCNC: 144 UNIT/L (ref 40–150)
ALT SERPL W/O P-5'-P-CCNC: 23 UNIT/L (ref 0–55)
ANION GAP (OHS): 10 MMOL/L (ref 8–16)
ANISOCYTOSIS BLD QL SMEAR: SLIGHT
APTT PPP: 28 SECONDS (ref 21–32)
AST SERPL-CCNC: 23 UNIT/L (ref 0–50)
BILIRUB SERPL-MCNC: 0.3 MG/DL (ref 0.1–1)
BLOOD GROUP ANTIBODIES SERPL: NORMAL
BUN SERPL-MCNC: 19 MG/DL (ref 6–20)
CA-I BLD-SCNC: 0.9 MMOL/L (ref 1.06–1.42)
CALCIUM SERPL-MCNC: 8.7 MG/DL (ref 8.7–10.5)
CHLORIDE SERPL-SCNC: 110 MMOL/L (ref 95–110)
CO2 SERPL-SCNC: 22 MMOL/L (ref 23–29)
CREAT SERPL-MCNC: 1.9 MG/DL (ref 0.5–1.4)
DAT IGG-SP REAG RBC-IMP: NORMAL
DOHLE BOD BLD QL SMEAR: PRESENT
ERYTHROCYTE [DISTWIDTH] IN BLOOD BY AUTOMATED COUNT: 15.1 % (ref 11.5–14.5)
GFR SERPLBLD CREATININE-BSD FMLA CKD-EPI: 41 ML/MIN/1.73/M2
GLUCOSE SERPL-MCNC: 151 MG/DL (ref 70–110)
HCT VFR BLD AUTO: 39.5 % (ref 40–54)
HGB BLD-MCNC: 13.4 GM/DL (ref 14–18)
HYPOCHROMIA BLD QL SMEAR: ABNORMAL
INDIRECT COOMBS: ABNORMAL
INR PPP: 1 (ref 0.8–1.2)
LABORATORY COMMENT REPORT: NORMAL
LYMPHOCYTES NFR BLD MANUAL: 12 % (ref 18–48)
Lab: 0.07 %
Lab: 23 CELLS/UL
Lab: 94.33 %
Lab: 98.29 %
MAGNESIUM SERPL-MCNC: 1.7 MG/DL (ref 1.6–2.6)
MCH RBC QN AUTO: 33.4 PG (ref 27–31)
MCHC RBC AUTO-ENTMCNC: 33.9 G/DL (ref 32–36)
MCV RBC AUTO: 99 FL (ref 82–98)
METAMYELOCYTES NFR BLD MANUAL: 1 %
MONOCYTES NFR BLD MANUAL: 11 % (ref 4–15)
MYELOCYTES NFR BLD MANUAL: 1 %
NEUTROPHILS NFR BLD MANUAL: 70 % (ref 38–73)
NEUTS BAND NFR BLD MANUAL: 5 %
NUCLEATED RBC (/100WBC) (OHS): 0 /100 WBC
PHOSPHATE SERPL-MCNC: 1.2 MG/DL (ref 2.7–4.5)
PLATELET # BLD AUTO: 192 K/UL (ref 150–450)
PLATELET BLD QL SMEAR: ABNORMAL
PMV BLD AUTO: 10.2 FL (ref 9.2–12.9)
POLYCHROMASIA BLD QL SMEAR: ABNORMAL
POTASSIUM SERPL-SCNC: 3.9 MMOL/L (ref 3.5–5.1)
PROT SERPL-MCNC: 6.5 GM/DL (ref 6–8.4)
PROTHROMBIN TIME: 10.8 SECONDS (ref 9–12.5)
RBC # BLD AUTO: 4.01 M/UL (ref 4.6–6.2)
RH BLD: ABNORMAL
SODIUM SERPL-SCNC: 142 MMOL/L (ref 136–145)
SPECIMEN OUTDATE: ABNORMAL
TOXIC GRANULES BLD QL SMEAR: PRESENT
WBC # BLD AUTO: 33.83 K/UL (ref 3.9–12.7)

## 2025-08-18 PROCEDURE — 85007 BL SMEAR W/DIFF WBC COUNT: CPT | Performed by: INTERNAL MEDICINE

## 2025-08-18 PROCEDURE — 84100 ASSAY OF PHOSPHORUS: CPT | Performed by: INTERNAL MEDICINE

## 2025-08-18 PROCEDURE — 37000008 HC ANESTHESIA 1ST 15 MINUTES: Performed by: SURGERY

## 2025-08-18 PROCEDURE — 96372 THER/PROPH/DIAG INJ SC/IM: CPT

## 2025-08-18 PROCEDURE — 36000706: Performed by: SURGERY

## 2025-08-18 PROCEDURE — 83735 ASSAY OF MAGNESIUM: CPT | Performed by: INTERNAL MEDICINE

## 2025-08-18 PROCEDURE — 37000009 HC ANESTHESIA EA ADD 15 MINS: Performed by: SURGERY

## 2025-08-18 PROCEDURE — 82330 ASSAY OF CALCIUM: CPT | Performed by: INTERNAL MEDICINE

## 2025-08-18 PROCEDURE — 25000003 PHARM REV CODE 250

## 2025-08-18 PROCEDURE — 86900 BLOOD TYPING SEROLOGIC ABO: CPT | Performed by: SURGERY

## 2025-08-18 PROCEDURE — 36000707: Performed by: SURGERY

## 2025-08-18 PROCEDURE — 71000015 HC POSTOP RECOV 1ST HR: Performed by: SURGERY

## 2025-08-18 PROCEDURE — 63600175 PHARM REV CODE 636 W HCPCS: Mod: JZ,TB

## 2025-08-18 PROCEDURE — 25000003 PHARM REV CODE 250: Performed by: STUDENT IN AN ORGANIZED HEALTH CARE EDUCATION/TRAINING PROGRAM

## 2025-08-18 PROCEDURE — 63600175 PHARM REV CODE 636 W HCPCS: Performed by: STUDENT IN AN ORGANIZED HEALTH CARE EDUCATION/TRAINING PROGRAM

## 2025-08-18 PROCEDURE — 86880 COOMBS TEST DIRECT: CPT | Performed by: SURGERY

## 2025-08-18 PROCEDURE — 85730 THROMBOPLASTIN TIME PARTIAL: CPT | Performed by: INTERNAL MEDICINE

## 2025-08-18 PROCEDURE — 63600175 PHARM REV CODE 636 W HCPCS: Performed by: SURGERY

## 2025-08-18 PROCEDURE — 77001 FLUOROGUIDE FOR VEIN DEVICE: CPT | Mod: 26,,, | Performed by: SURGERY

## 2025-08-18 PROCEDURE — 82040 ASSAY OF SERUM ALBUMIN: CPT | Performed by: INTERNAL MEDICINE

## 2025-08-18 PROCEDURE — 85610 PROTHROMBIN TIME: CPT | Performed by: INTERNAL MEDICINE

## 2025-08-18 PROCEDURE — 63600175 PHARM REV CODE 636 W HCPCS

## 2025-08-18 PROCEDURE — 86367 STEM CELLS TOTAL COUNT: CPT | Performed by: INTERNAL MEDICINE

## 2025-08-18 PROCEDURE — 27200651 HC AIRWAY, LMA: Performed by: ANESTHESIOLOGY

## 2025-08-18 PROCEDURE — 71000044 HC DOSC ROUTINE RECOVERY FIRST HOUR: Performed by: SURGERY

## 2025-08-18 PROCEDURE — 36558 INSERT TUNNELED CV CATH: CPT | Mod: RT,,, | Performed by: SURGERY

## 2025-08-18 PROCEDURE — C1751 CATH, INF, PER/CENT/MIDLINE: HCPCS | Performed by: SURGERY

## 2025-08-18 PROCEDURE — 86870 RBC ANTIBODY IDENTIFICATION: CPT | Performed by: SURGERY

## 2025-08-18 DEVICE — IMPLANTABLE DEVICE: Type: IMPLANTABLE DEVICE | Site: CHEST | Status: FUNCTIONAL

## 2025-08-18 RX ORDER — LANOLIN ALCOHOL/MO/W.PET/CERES
800 CREAM (GRAM) TOPICAL ONCE AS NEEDED
Status: DISCONTINUED | OUTPATIENT
Start: 2025-08-18 | End: 2025-08-18 | Stop reason: HOSPADM

## 2025-08-18 RX ORDER — ACETAMINOPHEN 325 MG/1
650 TABLET ORAL ONCE AS NEEDED
Status: DISCONTINUED | OUTPATIENT
Start: 2025-08-18 | End: 2025-08-18 | Stop reason: HOSPADM

## 2025-08-18 RX ORDER — LIDOCAINE HYDROCHLORIDE 20 MG/ML
INJECTION INTRAVENOUS
Status: DISCONTINUED | OUTPATIENT
Start: 2025-08-18 | End: 2025-08-18

## 2025-08-18 RX ORDER — ONDANSETRON HYDROCHLORIDE 2 MG/ML
INJECTION, SOLUTION INTRAVENOUS
Status: DISCONTINUED | OUTPATIENT
Start: 2025-08-18 | End: 2025-08-18

## 2025-08-18 RX ORDER — ONDANSETRON HYDROCHLORIDE 2 MG/ML
4 INJECTION, SOLUTION INTRAVENOUS DAILY PRN
Status: DISCONTINUED | OUTPATIENT
Start: 2025-08-18 | End: 2025-08-18 | Stop reason: HOSPADM

## 2025-08-18 RX ORDER — FENTANYL CITRATE 50 UG/ML
INJECTION, SOLUTION INTRAMUSCULAR; INTRAVENOUS
Status: DISCONTINUED | OUTPATIENT
Start: 2025-08-18 | End: 2025-08-18

## 2025-08-18 RX ORDER — OXYCODONE HYDROCHLORIDE 5 MG/1
5 TABLET ORAL
Status: DISCONTINUED | OUTPATIENT
Start: 2025-08-18 | End: 2025-08-18 | Stop reason: HOSPADM

## 2025-08-18 RX ORDER — BUPIVACAINE HYDROCHLORIDE 2.5 MG/ML
INJECTION, SOLUTION EPIDURAL; INFILTRATION; INTRACAUDAL; PERINEURAL
Status: DISCONTINUED | OUTPATIENT
Start: 2025-08-18 | End: 2025-08-18 | Stop reason: HOSPADM

## 2025-08-18 RX ORDER — POTASSIUM CHLORIDE 20 MEQ/1
40 TABLET, EXTENDED RELEASE ORAL ONCE AS NEEDED
Status: DISCONTINUED | OUTPATIENT
Start: 2025-08-18 | End: 2025-08-18 | Stop reason: HOSPADM

## 2025-08-18 RX ORDER — SODIUM,POTASSIUM PHOSPHATES 280-250MG
2 POWDER IN PACKET (EA) ORAL ONCE AS NEEDED
Status: DISCONTINUED | OUTPATIENT
Start: 2025-08-18 | End: 2025-08-18 | Stop reason: HOSPADM

## 2025-08-18 RX ORDER — DIPHENHYDRAMINE HCL 25 MG
25 CAPSULE ORAL ONCE AS NEEDED
Status: DISCONTINUED | OUTPATIENT
Start: 2025-08-18 | End: 2025-08-18 | Stop reason: HOSPADM

## 2025-08-18 RX ORDER — PROPOFOL 10 MG/ML
VIAL (ML) INTRAVENOUS CONTINUOUS PRN
Status: DISCONTINUED | OUTPATIENT
Start: 2025-08-18 | End: 2025-08-18

## 2025-08-18 RX ORDER — HEPARIN 100 UNIT/ML
SYRINGE INTRAVENOUS
Status: DISCONTINUED | OUTPATIENT
Start: 2025-08-18 | End: 2025-08-18 | Stop reason: HOSPADM

## 2025-08-18 RX ORDER — SODIUM CHLORIDE 9 MG/ML
INJECTION, SOLUTION INTRAVENOUS CONTINUOUS
Status: DISCONTINUED | OUTPATIENT
Start: 2025-08-18 | End: 2025-08-18 | Stop reason: HOSPADM

## 2025-08-18 RX ORDER — FENTANYL CITRATE 50 UG/ML
25 INJECTION, SOLUTION INTRAMUSCULAR; INTRAVENOUS EVERY 5 MIN PRN
Status: DISCONTINUED | OUTPATIENT
Start: 2025-08-18 | End: 2025-08-18 | Stop reason: HOSPADM

## 2025-08-18 RX ORDER — GLUCAGON 1 MG
1 KIT INJECTION
Status: DISCONTINUED | OUTPATIENT
Start: 2025-08-18 | End: 2025-08-18 | Stop reason: HOSPADM

## 2025-08-18 RX ORDER — DEXMEDETOMIDINE HYDROCHLORIDE 100 UG/ML
INJECTION, SOLUTION INTRAVENOUS
Status: DISCONTINUED | OUTPATIENT
Start: 2025-08-18 | End: 2025-08-18

## 2025-08-18 RX ORDER — CLINDAMYCIN PHOSPHATE 900 MG/50ML
900 INJECTION, SOLUTION INTRAVENOUS
Status: COMPLETED | OUTPATIENT
Start: 2025-08-18 | End: 2025-08-18

## 2025-08-18 RX ORDER — MIDAZOLAM HYDROCHLORIDE 1 MG/ML
INJECTION INTRAMUSCULAR; INTRAVENOUS
Status: DISCONTINUED | OUTPATIENT
Start: 2025-08-18 | End: 2025-08-18

## 2025-08-18 RX ORDER — SODIUM CHLORIDE 0.9 % (FLUSH) 0.9 %
10 SYRINGE (ML) INJECTION
Status: DISCONTINUED | OUTPATIENT
Start: 2025-08-18 | End: 2025-08-18 | Stop reason: HOSPADM

## 2025-08-18 RX ADMIN — ONDANSETRON 4 MG: 2 INJECTION INTRAMUSCULAR; INTRAVENOUS at 08:08

## 2025-08-18 RX ADMIN — LIDOCAINE HYDROCHLORIDE 50 MG: 20 INJECTION INTRAVENOUS at 07:08

## 2025-08-18 RX ADMIN — DEXMEDETOMIDINE 12 MCG: 100 INJECTION, SOLUTION, CONCENTRATE INTRAVENOUS at 07:08

## 2025-08-18 RX ADMIN — FILGRASTIM 960 MCG: 480 INJECTION, SOLUTION INTRAVENOUS; SUBCUTANEOUS at 10:08

## 2025-08-18 RX ADMIN — MIDAZOLAM HYDROCHLORIDE 2 MG: 2 INJECTION, SOLUTION INTRAMUSCULAR; INTRAVENOUS at 07:08

## 2025-08-18 RX ADMIN — SODIUM CHLORIDE: 0.9 INJECTION, SOLUTION INTRAVENOUS at 07:08

## 2025-08-18 RX ADMIN — PROPOFOL 125 MCG/KG/MIN: 10 INJECTION, EMULSION INTRAVENOUS at 07:08

## 2025-08-18 RX ADMIN — CLINDAMYCIN IN 5 PERCENT DEXTROSE 900 MG: 18 INJECTION, SOLUTION INTRAVENOUS at 07:08

## 2025-08-18 RX ADMIN — FENTANYL CITRATE 100 MCG: 50 INJECTION, SOLUTION INTRAMUSCULAR; INTRAVENOUS at 07:08

## 2025-08-19 ENCOUNTER — HOSPITAL ENCOUNTER (OUTPATIENT)
Dept: TRANSFUSION MEDICINE | Facility: HOSPITAL | Age: 55
Discharge: HOME | End: 2025-08-19
Payer: COMMERCIAL

## 2025-08-19 VITALS
OXYGEN SATURATION: 95 % | WEIGHT: 197 LBS | SYSTOLIC BLOOD PRESSURE: 147 MMHG | HEART RATE: 68 BPM | TEMPERATURE: 98 F | RESPIRATION RATE: 18 BRPM | DIASTOLIC BLOOD PRESSURE: 83 MMHG | BODY MASS INDEX: 28.2 KG/M2 | HEIGHT: 70 IN

## 2025-08-19 DIAGNOSIS — C90.00 MULTIPLE MYELOMA NOT HAVING ACHIEVED REMISSION: ICD-10-CM

## 2025-08-19 DIAGNOSIS — Z76.82 STEM CELL TRANSPLANT CANDIDATE: Primary | ICD-10-CM

## 2025-08-19 LAB
ABSOLUTE NEUTROPHIL MANUAL (OHS): 18.8 K/UL (ref 1.8–7.7)
ABSOLUTE NEUTROPHIL MANUAL (OHS): 24.3 K/UL (ref 1.8–7.7)
ABSOLUTE NEUTROPHIL MANUAL (OHS): 35.4 K/UL (ref 1.8–7.7)
ALBUMIN SERPL BCP-MCNC: 3.1 G/DL (ref 3.5–5.2)
ALBUMIN SERPL BCP-MCNC: 3.3 G/DL (ref 3.5–5.2)
ALBUMIN SERPL BCP-MCNC: 3.8 G/DL (ref 3.5–5.2)
ALP SERPL-CCNC: 175 UNIT/L (ref 40–150)
ALP SERPL-CCNC: 179 UNIT/L (ref 40–150)
ALP SERPL-CCNC: 185 UNIT/L (ref 40–150)
ALT SERPL W/O P-5'-P-CCNC: 15 UNIT/L (ref 0–55)
ALT SERPL W/O P-5'-P-CCNC: 16 UNIT/L (ref 0–55)
ALT SERPL W/O P-5'-P-CCNC: 20 UNIT/L (ref 0–55)
ANION GAP (OHS): 12 MMOL/L (ref 8–16)
ANION GAP (OHS): 13 MMOL/L (ref 8–16)
ANION GAP (OHS): 17 MMOL/L (ref 8–16)
AST SERPL-CCNC: 24 UNIT/L (ref 0–50)
AST SERPL-CCNC: 27 UNIT/L (ref 0–50)
AST SERPL-CCNC: 33 UNIT/L (ref 0–50)
BILIRUB SERPL-MCNC: 0.2 MG/DL (ref 0.1–1)
BILIRUB SERPL-MCNC: 0.2 MG/DL (ref 0.1–1)
BILIRUB SERPL-MCNC: 0.3 MG/DL (ref 0.1–1)
BUN SERPL-MCNC: 13 MG/DL (ref 6–20)
BUN SERPL-MCNC: 15 MG/DL (ref 6–20)
BUN SERPL-MCNC: 16 MG/DL (ref 6–20)
CA-I BLD-SCNC: 0.99 MMOL/L (ref 1.06–1.42)
CA-I BLD-SCNC: 1 MMOL/L (ref 1.06–1.42)
CA-I BLD-SCNC: 1.08 MMOL/L (ref 1.06–1.42)
CALCIUM SERPL-MCNC: 8.4 MG/DL (ref 8.7–10.5)
CALCIUM SERPL-MCNC: 9 MG/DL (ref 8.7–10.5)
CALCIUM SERPL-MCNC: 9.1 MG/DL (ref 8.7–10.5)
CHLORIDE SERPL-SCNC: 100 MMOL/L (ref 95–110)
CHLORIDE SERPL-SCNC: 106 MMOL/L (ref 95–110)
CHLORIDE SERPL-SCNC: 108 MMOL/L (ref 95–110)
CO2 SERPL-SCNC: 18 MMOL/L (ref 23–29)
CO2 SERPL-SCNC: 23 MMOL/L (ref 23–29)
CO2 SERPL-SCNC: 24 MMOL/L (ref 23–29)
CREAT SERPL-MCNC: 1.8 MG/DL (ref 0.5–1.4)
CREAT SERPL-MCNC: 1.8 MG/DL (ref 0.5–1.4)
CREAT SERPL-MCNC: 2 MG/DL (ref 0.5–1.4)
DACRYOCYTES BLD QL SMEAR: ABNORMAL
DACRYOCYTES BLD QL SMEAR: ABNORMAL
DOHLE BOD BLD QL SMEAR: PRESENT
EOSINOPHIL NFR BLD MANUAL: 1 % (ref 0–8)
EOSINOPHIL NFR BLD MANUAL: 4 % (ref 0–8)
ERYTHROCYTE [DISTWIDTH] IN BLOOD BY AUTOMATED COUNT: 15 % (ref 11.5–14.5)
ERYTHROCYTE [DISTWIDTH] IN BLOOD BY AUTOMATED COUNT: 15.2 % (ref 11.5–14.5)
ERYTHROCYTE [DISTWIDTH] IN BLOOD BY AUTOMATED COUNT: 15.3 % (ref 11.5–14.5)
GFR SERPLBLD CREATININE-BSD FMLA CKD-EPI: 39 ML/MIN/1.73/M2
GFR SERPLBLD CREATININE-BSD FMLA CKD-EPI: 44 ML/MIN/1.73/M2
GFR SERPLBLD CREATININE-BSD FMLA CKD-EPI: 44 ML/MIN/1.73/M2
GLUCOSE SERPL-MCNC: 161 MG/DL (ref 70–110)
GLUCOSE SERPL-MCNC: 246 MG/DL (ref 70–110)
GLUCOSE SERPL-MCNC: 249 MG/DL (ref 70–110)
HCT VFR BLD AUTO: 33.2 % (ref 40–54)
HCT VFR BLD AUTO: 33.3 % (ref 40–54)
HCT VFR BLD AUTO: 38.2 % (ref 40–54)
HGB BLD-MCNC: 11.5 GM/DL (ref 14–18)
HGB BLD-MCNC: 11.7 GM/DL (ref 14–18)
HGB BLD-MCNC: 12.6 GM/DL (ref 14–18)
LYMPHOCYTES NFR BLD MANUAL: 10 % (ref 18–48)
LYMPHOCYTES NFR BLD MANUAL: 2 % (ref 18–48)
LYMPHOCYTES NFR BLD MANUAL: 3 % (ref 18–48)
MAGNESIUM SERPL-MCNC: 1.6 MG/DL (ref 1.6–2.6)
MAGNESIUM SERPL-MCNC: 1.6 MG/DL (ref 1.6–2.6)
MAGNESIUM SERPL-MCNC: 1.7 MG/DL (ref 1.6–2.6)
MCH RBC QN AUTO: 32.6 PG (ref 27–31)
MCH RBC QN AUTO: 34 PG (ref 27–31)
MCH RBC QN AUTO: 34.4 PG (ref 27–31)
MCHC RBC AUTO-ENTMCNC: 33 G/DL (ref 32–36)
MCHC RBC AUTO-ENTMCNC: 34.6 G/DL (ref 32–36)
MCHC RBC AUTO-ENTMCNC: 35.1 G/DL (ref 32–36)
MCV RBC AUTO: 98 FL (ref 82–98)
MCV RBC AUTO: 98 FL (ref 82–98)
MCV RBC AUTO: 99 FL (ref 82–98)
METAMYELOCYTES NFR BLD MANUAL: 3 %
MONOCYTES NFR BLD MANUAL: 2 % (ref 4–15)
MONOCYTES NFR BLD MANUAL: 4 % (ref 4–15)
MONOCYTES NFR BLD MANUAL: 6 % (ref 4–15)
MYELOCYTES NFR BLD MANUAL: 1 %
MYELOCYTES NFR BLD MANUAL: 2 %
MYELOCYTES NFR BLD MANUAL: 3 %
NEUTROPHILS NFR BLD MANUAL: 69 % (ref 38–73)
NEUTROPHILS NFR BLD MANUAL: 74 % (ref 38–73)
NEUTROPHILS NFR BLD MANUAL: 88 % (ref 38–73)
NEUTS BAND NFR BLD MANUAL: 1 %
NEUTS BAND NFR BLD MANUAL: 21 %
NUCLEATED RBC (/100WBC) (OHS): 0 /100 WBC
OVALOCYTES BLD QL SMEAR: ABNORMAL
PHOSPHATE SERPL-MCNC: 1.3 MG/DL (ref 2.7–4.5)
PHOSPHATE SERPL-MCNC: 1.8 MG/DL (ref 2.7–4.5)
PHOSPHATE SERPL-MCNC: 2.3 MG/DL (ref 2.7–4.5)
PLATELET # BLD AUTO: 177 K/UL (ref 150–450)
PLATELET # BLD AUTO: 69 K/UL (ref 150–450)
PLATELET # BLD AUTO: 97 K/UL (ref 150–450)
PLATELET BLD QL SMEAR: ABNORMAL
PMV BLD AUTO: 10.5 FL (ref 9.2–12.9)
PMV BLD AUTO: 8.9 FL (ref 9.2–12.9)
PMV BLD AUTO: 9.5 FL (ref 9.2–12.9)
POTASSIUM SERPL-SCNC: 3.4 MMOL/L (ref 3.5–5.1)
POTASSIUM SERPL-SCNC: 3.6 MMOL/L (ref 3.5–5.1)
POTASSIUM SERPL-SCNC: 3.7 MMOL/L (ref 3.5–5.1)
PROT SERPL-MCNC: 5.5 GM/DL (ref 6–8.4)
PROT SERPL-MCNC: 5.6 GM/DL (ref 6–8.4)
PROT SERPL-MCNC: 6.2 GM/DL (ref 6–8.4)
RBC # BLD AUTO: 3.38 M/UL (ref 4.6–6.2)
RBC # BLD AUTO: 3.4 M/UL (ref 4.6–6.2)
RBC # BLD AUTO: 3.86 M/UL (ref 4.6–6.2)
SODIUM SERPL-SCNC: 138 MMOL/L (ref 136–145)
SODIUM SERPL-SCNC: 140 MMOL/L (ref 136–145)
SODIUM SERPL-SCNC: 143 MMOL/L (ref 136–145)
SPHEROCYTES BLD QL SMEAR: ABNORMAL
SPHEROCYTES BLD QL SMEAR: ABNORMAL
TOXIC GRANULES BLD QL SMEAR: PRESENT
WBC # BLD AUTO: 25.39 K/UL (ref 3.9–12.7)
WBC # BLD AUTO: 26.99 K/UL (ref 3.9–12.7)
WBC # BLD AUTO: 39.73 K/UL (ref 3.9–12.7)

## 2025-08-19 PROCEDURE — 82330 ASSAY OF CALCIUM: CPT | Mod: 91

## 2025-08-19 PROCEDURE — 84100 ASSAY OF PHOSPHORUS: CPT | Mod: 91

## 2025-08-19 PROCEDURE — 63600175 PHARM REV CODE 636 W HCPCS: Mod: TB | Performed by: INTERNAL MEDICINE

## 2025-08-19 PROCEDURE — 80053 COMPREHEN METABOLIC PANEL: CPT | Mod: 91

## 2025-08-19 PROCEDURE — 38206 HARVEST AUTO STEM CELLS: CPT

## 2025-08-19 PROCEDURE — 85007 BL SMEAR W/DIFF WBC COUNT: CPT | Mod: 91

## 2025-08-19 PROCEDURE — 83735 ASSAY OF MAGNESIUM: CPT | Mod: 91

## 2025-08-19 PROCEDURE — 25000003 PHARM REV CODE 250: Performed by: PATHOLOGY

## 2025-08-19 PROCEDURE — 25000003 PHARM REV CODE 250: Performed by: INTERNAL MEDICINE

## 2025-08-19 PROCEDURE — 25000003 PHARM REV CODE 250

## 2025-08-19 PROCEDURE — 63600175 PHARM REV CODE 636 W HCPCS: Performed by: PATHOLOGY

## 2025-08-19 PROCEDURE — 63600175 PHARM REV CODE 636 W HCPCS: Mod: JZ,TB

## 2025-08-19 RX ORDER — HEPARIN SODIUM 1000 [USP'U]/ML
3000 INJECTION, SOLUTION INTRAVENOUS; SUBCUTANEOUS ONCE
Status: COMPLETED | OUTPATIENT
Start: 2025-08-19 | End: 2025-08-19

## 2025-08-19 RX ORDER — PLERIXAFOR 20 MG/ML
0.24 INJECTION, SOLUTION SUBCUTANEOUS ONCE AS NEEDED
Status: CANCELLED | OUTPATIENT
Start: 2025-08-19 | End: 2037-01-15

## 2025-08-19 RX ORDER — SODIUM,POTASSIUM PHOSPHATES 280-250MG
2 POWDER IN PACKET (EA) ORAL ONCE
Status: COMPLETED | OUTPATIENT
Start: 2025-08-19 | End: 2025-08-19

## 2025-08-19 RX ORDER — CALCIUM GLUCONATE 20 MG/ML
2 INJECTION, SOLUTION INTRAVENOUS ONCE
Status: COMPLETED | OUTPATIENT
Start: 2025-08-19 | End: 2025-08-19

## 2025-08-19 RX ORDER — ACETAMINOPHEN 325 MG/1
650 TABLET ORAL ONCE AS NEEDED
Status: DISCONTINUED | OUTPATIENT
Start: 2025-08-19 | End: 2025-08-20 | Stop reason: HOSPADM

## 2025-08-19 RX ORDER — POTASSIUM CHLORIDE 20 MEQ/1
40 TABLET, EXTENDED RELEASE ORAL ONCE AS NEEDED
Status: DISCONTINUED | OUTPATIENT
Start: 2025-08-19 | End: 2025-08-20 | Stop reason: HOSPADM

## 2025-08-19 RX ORDER — DIPHENHYDRAMINE HCL 25 MG
25 CAPSULE ORAL ONCE AS NEEDED
Status: DISCONTINUED | OUTPATIENT
Start: 2025-08-19 | End: 2025-08-20 | Stop reason: HOSPADM

## 2025-08-19 RX ORDER — LANOLIN ALCOHOL/MO/W.PET/CERES
800 CREAM (GRAM) TOPICAL ONCE AS NEEDED
Status: DISCONTINUED | OUTPATIENT
Start: 2025-08-19 | End: 2025-08-20 | Stop reason: HOSPADM

## 2025-08-19 RX ORDER — PLERIXAFOR 20 MG/ML
0.24 INJECTION, SOLUTION SUBCUTANEOUS ONCE AS NEEDED
Status: COMPLETED | OUTPATIENT
Start: 2025-08-19 | End: 2025-08-19

## 2025-08-19 RX ORDER — SODIUM,POTASSIUM PHOSPHATES 280-250MG
2 POWDER IN PACKET (EA) ORAL ONCE AS NEEDED
Status: COMPLETED | OUTPATIENT
Start: 2025-08-19 | End: 2025-08-19

## 2025-08-19 RX ADMIN — POTASSIUM & SODIUM PHOSPHATES POWDER PACK 280-160-250 MG 2 PACKET: 280-160-250 PACK at 04:08

## 2025-08-19 RX ADMIN — POTASSIUM & SODIUM PHOSPHATES POWDER PACK 280-160-250 MG 2 PACKET: 280-160-250 PACK at 01:08

## 2025-08-19 RX ADMIN — HEPARIN SODIUM 3100 UNITS: 1000 INJECTION INTRAVENOUS; SUBCUTANEOUS at 04:08

## 2025-08-19 RX ADMIN — POTASSIUM & SODIUM PHOSPHATES POWDER PACK 280-160-250 MG 2 PACKET: 280-160-250 PACK at 10:08

## 2025-08-19 RX ADMIN — FILGRASTIM 960 MCG: 480 INJECTION, SOLUTION INTRAVENOUS; SUBCUTANEOUS at 08:08

## 2025-08-19 RX ADMIN — PLERIXAFOR 22 MG: 24 INJECTION, SOLUTION SUBCUTANEOUS at 05:08

## 2025-08-19 RX ADMIN — POTASSIUM CHLORIDE 40 MEQ: 20 TABLET, EXTENDED RELEASE ORAL at 10:08

## 2025-08-19 RX ADMIN — CALCIUM GLUCONATE 4 G: 98 INJECTION, SOLUTION INTRAVENOUS at 08:08

## 2025-08-19 RX ADMIN — POTASSIUM & SODIUM PHOSPHATES POWDER PACK 280-160-250 MG 2 PACKET: 280-160-250 PACK at 08:08

## 2025-08-19 RX ADMIN — CALCIUM GLUCONATE 2 G: 20 INJECTION, SOLUTION INTRAVENOUS at 02:08

## 2025-08-20 ENCOUNTER — HOSPITAL ENCOUNTER (OUTPATIENT)
Dept: TRANSFUSION MEDICINE | Facility: HOSPITAL | Age: 55
Discharge: HOME | End: 2025-08-20
Payer: COMMERCIAL

## 2025-08-20 VITALS
TEMPERATURE: 98 F | RESPIRATION RATE: 18 BRPM | WEIGHT: 197 LBS | HEART RATE: 64 BPM | SYSTOLIC BLOOD PRESSURE: 134 MMHG | OXYGEN SATURATION: 98 % | BODY MASS INDEX: 28.2 KG/M2 | DIASTOLIC BLOOD PRESSURE: 76 MMHG | HEIGHT: 70 IN

## 2025-08-20 DIAGNOSIS — C90.00 MULTIPLE MYELOMA NOT HAVING ACHIEVED REMISSION: Primary | ICD-10-CM

## 2025-08-20 DIAGNOSIS — Z76.82 STEM CELL TRANSPLANT CANDIDATE: ICD-10-CM

## 2025-08-20 LAB
ABSOLUTE NEUTROPHIL MANUAL (OHS): 23.2 K/UL (ref 1.8–7.7)
ABSOLUTE NEUTROPHIL MANUAL (OHS): 38.6 K/UL (ref 1.8–7.7)
ALBUMIN SERPL BCP-MCNC: 3.1 G/DL (ref 3.5–5.2)
ALP SERPL-CCNC: 196 UNIT/L (ref 40–150)
ALT SERPL W/O P-5'-P-CCNC: 16 UNIT/L (ref 0–55)
ANION GAP (OHS): 11 MMOL/L (ref 8–16)
ANISOCYTOSIS BLD QL SMEAR: SLIGHT
AST SERPL-CCNC: 23 UNIT/L (ref 0–50)
BILIRUB SERPL-MCNC: 0.3 MG/DL (ref 0.1–1)
BUN SERPL-MCNC: 12 MG/DL (ref 6–20)
CA-I BLD-SCNC: 1.1 MMOL/L (ref 1.06–1.42)
CA-I BLD-SCNC: 1.1 MMOL/L (ref 1.06–1.42)
CALCIUM SERPL-MCNC: 9.2 MG/DL (ref 8.7–10.5)
CHLORIDE SERPL-SCNC: 104 MMOL/L (ref 95–110)
CO2 SERPL-SCNC: 28 MMOL/L (ref 23–29)
CREAT SERPL-MCNC: 1.8 MG/DL (ref 0.5–1.4)
DOHLE BOD BLD QL SMEAR: PRESENT
EOSINOPHIL NFR BLD MANUAL: 1 % (ref 0–8)
EOSINOPHIL NFR BLD MANUAL: 3 % (ref 0–8)
ERYTHROCYTE [DISTWIDTH] IN BLOOD BY AUTOMATED COUNT: 14.7 % (ref 11.5–14.5)
ERYTHROCYTE [DISTWIDTH] IN BLOOD BY AUTOMATED COUNT: 14.9 % (ref 11.5–14.5)
GFR SERPLBLD CREATININE-BSD FMLA CKD-EPI: 44 ML/MIN/1.73/M2
GLUCOSE SERPL-MCNC: 164 MG/DL (ref 70–110)
HCT VFR BLD AUTO: 32.2 % (ref 40–54)
HCT VFR BLD AUTO: 36.6 % (ref 40–54)
HGB BLD-MCNC: 11.2 GM/DL (ref 14–18)
HGB BLD-MCNC: 12.4 GM/DL (ref 14–18)
HYPOCHROMIA BLD QL SMEAR: ABNORMAL
LYMPHOCYTES NFR BLD MANUAL: 10 % (ref 18–48)
LYMPHOCYTES NFR BLD MANUAL: 8 % (ref 18–48)
MAGNESIUM SERPL-MCNC: 1.4 MG/DL (ref 1.6–2.6)
MAGNESIUM SERPL-MCNC: 1.5 MG/DL (ref 1.6–2.6)
MCH RBC QN AUTO: 33 PG (ref 27–31)
MCH RBC QN AUTO: 33.8 PG (ref 27–31)
MCHC RBC AUTO-ENTMCNC: 33.9 G/DL (ref 32–36)
MCHC RBC AUTO-ENTMCNC: 34.8 G/DL (ref 32–36)
MCV RBC AUTO: 97 FL (ref 82–98)
MCV RBC AUTO: 97 FL (ref 82–98)
METAMYELOCYTES NFR BLD MANUAL: 3 %
METAMYELOCYTES NFR BLD MANUAL: 4 %
MONOCYTES NFR BLD MANUAL: 10 % (ref 4–15)
MONOCYTES NFR BLD MANUAL: 8 % (ref 4–15)
MYELOCYTES NFR BLD MANUAL: 3 %
MYELOCYTES NFR BLD MANUAL: 8 %
NEUTROPHILS NFR BLD MANUAL: 49 % (ref 38–73)
NEUTROPHILS NFR BLD MANUAL: 72 % (ref 38–73)
NEUTS BAND NFR BLD MANUAL: 17 %
NEUTS BAND NFR BLD MANUAL: 3 %
NUCLEATED RBC (/100WBC) (OHS): 0 /100 WBC
NUCLEATED RBC (/100WBC) (OHS): 0 /100 WBC
PHOSPHATE SERPL-MCNC: 3 MG/DL (ref 2.7–4.5)
PHOSPHATE SERPL-MCNC: 3.3 MG/DL (ref 2.7–4.5)
PLATELET # BLD AUTO: 48 K/UL (ref 150–450)
PLATELET # BLD AUTO: 68 K/UL (ref 150–450)
PLATELET BLD QL SMEAR: ABNORMAL
PLATELET BLD QL SMEAR: ABNORMAL
PMV BLD AUTO: 9.5 FL (ref 9.2–12.9)
PMV BLD AUTO: 9.6 FL (ref 9.2–12.9)
POTASSIUM SERPL-SCNC: 3.5 MMOL/L (ref 3.5–5.1)
PROMYELOCYTES NFR BLD MANUAL: 1 %
PROT SERPL-MCNC: 5 GM/DL (ref 6–8.4)
RBC # BLD AUTO: 3.31 M/UL (ref 4.6–6.2)
RBC # BLD AUTO: 3.76 M/UL (ref 4.6–6.2)
SMUDGE CELLS BLD QL SMEAR: PRESENT
SODIUM SERPL-SCNC: 143 MMOL/L (ref 136–145)
TOXIC GRANULES BLD QL SMEAR: PRESENT
WBC # BLD AUTO: 35.2 K/UL (ref 3.9–12.7)
WBC # BLD AUTO: 51.41 K/UL (ref 3.9–12.7)

## 2025-08-20 PROCEDURE — 85007 BL SMEAR W/DIFF WBC COUNT: CPT | Mod: 91

## 2025-08-20 PROCEDURE — 63600175 PHARM REV CODE 636 W HCPCS: Performed by: PATHOLOGY

## 2025-08-20 PROCEDURE — 25000003 PHARM REV CODE 250: Performed by: INTERNAL MEDICINE

## 2025-08-20 PROCEDURE — 84100 ASSAY OF PHOSPHORUS: CPT | Mod: 91

## 2025-08-20 PROCEDURE — 82330 ASSAY OF CALCIUM: CPT | Mod: 91

## 2025-08-20 PROCEDURE — 63600175 PHARM REV CODE 636 W HCPCS: Performed by: INTERNAL MEDICINE

## 2025-08-20 PROCEDURE — 38206 HARVEST AUTO STEM CELLS: CPT

## 2025-08-20 PROCEDURE — 80053 COMPREHEN METABOLIC PANEL: CPT

## 2025-08-20 PROCEDURE — 63600175 PHARM REV CODE 636 W HCPCS: Mod: JZ,TB

## 2025-08-20 PROCEDURE — 83735 ASSAY OF MAGNESIUM: CPT | Mod: 91

## 2025-08-20 PROCEDURE — 25000003 PHARM REV CODE 250

## 2025-08-20 RX ORDER — ACETAMINOPHEN 325 MG/1
650 TABLET ORAL ONCE AS NEEDED
Status: DISCONTINUED | OUTPATIENT
Start: 2025-08-20 | End: 2025-08-21 | Stop reason: HOSPADM

## 2025-08-20 RX ORDER — POTASSIUM CHLORIDE 20 MEQ/1
40 TABLET, EXTENDED RELEASE ORAL ONCE AS NEEDED
Status: DISCONTINUED | OUTPATIENT
Start: 2025-08-20 | End: 2025-08-21 | Stop reason: HOSPADM

## 2025-08-20 RX ORDER — LANOLIN ALCOHOL/MO/W.PET/CERES
800 CREAM (GRAM) TOPICAL ONCE AS NEEDED
Status: COMPLETED | OUTPATIENT
Start: 2025-08-20 | End: 2025-08-20

## 2025-08-20 RX ORDER — SODIUM,POTASSIUM PHOSPHATES 280-250MG
2 POWDER IN PACKET (EA) ORAL ONCE AS NEEDED
Status: DISCONTINUED | OUTPATIENT
Start: 2025-08-20 | End: 2025-08-21 | Stop reason: HOSPADM

## 2025-08-20 RX ORDER — HEPARIN SODIUM 1000 [USP'U]/ML
3200 INJECTION, SOLUTION INTRAVENOUS; SUBCUTANEOUS
Status: DISCONTINUED | OUTPATIENT
Start: 2025-08-20 | End: 2025-08-21 | Stop reason: HOSPADM

## 2025-08-20 RX ORDER — DIPHENHYDRAMINE HCL 25 MG
25 CAPSULE ORAL ONCE AS NEEDED
Status: DISCONTINUED | OUTPATIENT
Start: 2025-08-20 | End: 2025-08-21 | Stop reason: HOSPADM

## 2025-08-20 RX ADMIN — Medication 800 MG: at 12:08

## 2025-08-20 RX ADMIN — HEPARIN SODIUM 3100 UNITS: 1000 INJECTION, SOLUTION INTRAVENOUS; SUBCUTANEOUS at 01:08

## 2025-08-20 RX ADMIN — FILGRASTIM 960 MCG: 480 INJECTION, SOLUTION INTRAVENOUS; SUBCUTANEOUS at 08:08

## 2025-08-20 RX ADMIN — CALCIUM GLUCONATE 4 G: 98 INJECTION, SOLUTION INTRAVENOUS at 08:08

## 2025-08-20 RX ADMIN — Medication 800 MG: at 08:08

## 2025-08-21 PROBLEM — K21.9 GERD (GASTROESOPHAGEAL REFLUX DISEASE): Status: ACTIVE | Noted: 2025-08-21

## 2025-08-21 PROBLEM — N18.32 STAGE 3B CHRONIC KIDNEY DISEASE: Status: ACTIVE | Noted: 2025-08-21

## 2025-08-21 PROBLEM — F39 MOOD DISORDER: Status: ACTIVE | Noted: 2025-08-21

## 2025-08-21 PROBLEM — C90.00 MULTIPLE MYELOMA: Status: ACTIVE | Noted: 2025-08-21

## 2025-08-21 PROBLEM — J30.9 ALLERGIC RHINITIS: Status: ACTIVE | Noted: 2025-08-21

## 2025-08-22 ENCOUNTER — CLINICAL SUPPORT (OUTPATIENT)
Dept: HEMATOLOGY/ONCOLOGY | Facility: CLINIC | Age: 55
End: 2025-08-22
Payer: COMMERCIAL

## 2025-08-22 ENCOUNTER — INFUSION (OUTPATIENT)
Dept: INFUSION THERAPY | Facility: HOSPITAL | Age: 55
End: 2025-08-22
Payer: COMMERCIAL

## 2025-08-22 ENCOUNTER — OFFICE VISIT (OUTPATIENT)
Dept: HEMATOLOGY/ONCOLOGY | Facility: CLINIC | Age: 55
End: 2025-08-22
Payer: COMMERCIAL

## 2025-08-22 VITALS
TEMPERATURE: 98 F | WEIGHT: 198.75 LBS | HEIGHT: 70 IN | HEART RATE: 62 BPM | DIASTOLIC BLOOD PRESSURE: 85 MMHG | OXYGEN SATURATION: 96 % | BODY MASS INDEX: 28.45 KG/M2 | SYSTOLIC BLOOD PRESSURE: 137 MMHG

## 2025-08-22 DIAGNOSIS — D84.81 IMMUNODEFICIENCY DUE TO CONDITIONS CLASSIFIED ELSEWHERE: ICD-10-CM

## 2025-08-22 DIAGNOSIS — Z94.84 H/O STEM CELL TRANSPLANT: Primary | ICD-10-CM

## 2025-08-22 DIAGNOSIS — Z76.82 STEM CELL TRANSPLANT CANDIDATE: ICD-10-CM

## 2025-08-22 DIAGNOSIS — C90.00 MULTIPLE MYELOMA NOT HAVING ACHIEVED REMISSION: ICD-10-CM

## 2025-08-22 DIAGNOSIS — C90.01 MULTIPLE MYELOMA IN REMISSION: Primary | ICD-10-CM

## 2025-08-22 DIAGNOSIS — Z76.82 STEM CELL TRANSPLANT CANDIDATE: Primary | ICD-10-CM

## 2025-08-22 PROBLEM — T45.1X5A ANEMIA DUE TO CHEMOTHERAPY: Status: ACTIVE | Noted: 2025-08-22

## 2025-08-22 PROBLEM — D63.0 ANEMIA IN NEOPLASTIC DISEASE: Status: ACTIVE | Noted: 2025-08-22

## 2025-08-22 PROBLEM — T45.1X5A CHEMOTHERAPY-INDUCED THROMBOCYTOPENIA: Status: ACTIVE | Noted: 2025-08-22

## 2025-08-22 PROBLEM — D64.81 ANEMIA DUE TO CHEMOTHERAPY: Status: ACTIVE | Noted: 2025-08-22

## 2025-08-22 PROBLEM — D69.59 CHEMOTHERAPY-INDUCED THROMBOCYTOPENIA: Status: ACTIVE | Noted: 2025-08-22

## 2025-08-22 LAB
ABSOLUTE NEUTROPHIL MANUAL (OHS): 28.3 K/UL (ref 1.8–7.7)
ALBUMIN SERPL BCP-MCNC: 3.9 G/DL (ref 3.5–5.2)
ALP SERPL-CCNC: 220 UNIT/L (ref 40–150)
ALT SERPL W/O P-5'-P-CCNC: 23 UNIT/L (ref 0–55)
ANION GAP (OHS): 10 MMOL/L (ref 8–16)
ANISOCYTOSIS BLD QL SMEAR: SLIGHT
AST SERPL-CCNC: 22 UNIT/L (ref 0–50)
BILIRUB SERPL-MCNC: 0.2 MG/DL (ref 0.1–1)
BUN SERPL-MCNC: 15 MG/DL (ref 6–20)
CALCIUM SERPL-MCNC: 9.1 MG/DL (ref 8.7–10.5)
CHLORIDE SERPL-SCNC: 107 MMOL/L (ref 95–110)
CO2 SERPL-SCNC: 23 MMOL/L (ref 23–29)
CREAT SERPL-MCNC: 1.8 MG/DL (ref 0.5–1.4)
DACRYOCYTES BLD QL SMEAR: ABNORMAL
ERYTHROCYTE [DISTWIDTH] IN BLOOD BY AUTOMATED COUNT: 14.5 % (ref 11.5–14.5)
GFR SERPLBLD CREATININE-BSD FMLA CKD-EPI: 44 ML/MIN/1.73/M2
GLUCOSE SERPL-MCNC: 187 MG/DL (ref 70–110)
HCT VFR BLD AUTO: 36.2 % (ref 40–54)
HGB BLD-MCNC: 12.3 GM/DL (ref 14–18)
INDIRECT COOMBS: ABNORMAL
LYMPHOCYTES NFR BLD MANUAL: 8 % (ref 18–48)
MCH RBC QN AUTO: 33.2 PG (ref 27–31)
MCHC RBC AUTO-ENTMCNC: 34 G/DL (ref 32–36)
MCV RBC AUTO: 98 FL (ref 82–98)
MONOCYTES NFR BLD MANUAL: 1 % (ref 4–15)
NEUTROPHILS NFR BLD MANUAL: 86 % (ref 38–73)
NEUTS BAND NFR BLD MANUAL: 5 %
NUCLEATED RBC (/100WBC) (OHS): 0 /100 WBC
PHOSPHATE SERPL-MCNC: 2.2 MG/DL (ref 2.7–4.5)
PLATELET # BLD AUTO: 72 K/UL (ref 150–450)
PLATELET BLD QL SMEAR: ABNORMAL
PMV BLD AUTO: 10.1 FL (ref 9.2–12.9)
POIKILOCYTOSIS BLD QL SMEAR: SLIGHT
POLYCHROMASIA BLD QL SMEAR: ABNORMAL
POTASSIUM SERPL-SCNC: 3.8 MMOL/L (ref 3.5–5.1)
PROT SERPL-MCNC: 6.1 GM/DL (ref 6–8.4)
RBC # BLD AUTO: 3.7 M/UL (ref 4.6–6.2)
RH BLD: ABNORMAL
SARS-COV-2 RDRP RESP QL NAA+PROBE: NEGATIVE
SODIUM SERPL-SCNC: 140 MMOL/L (ref 136–145)
SPECIMEN OUTDATE: ABNORMAL
WBC # BLD AUTO: 31.05 K/UL (ref 3.9–12.7)

## 2025-08-22 PROCEDURE — 99999 PR PBB SHADOW E&M-EST. PATIENT-LVL I: CPT | Mod: PBBFAC,,,

## 2025-08-22 PROCEDURE — U0002 COVID-19 LAB TEST NON-CDC: HCPCS | Performed by: INTERNAL MEDICINE

## 2025-08-22 PROCEDURE — 82040 ASSAY OF SERUM ALBUMIN: CPT

## 2025-08-22 PROCEDURE — 84100 ASSAY OF PHOSPHORUS: CPT

## 2025-08-22 PROCEDURE — 86850 RBC ANTIBODY SCREEN: CPT | Performed by: INTERNAL MEDICINE

## 2025-08-22 PROCEDURE — 85027 COMPLETE CBC AUTOMATED: CPT

## 2025-08-22 PROCEDURE — 99999 PR PBB SHADOW E&M-EST. PATIENT-LVL IV: CPT | Mod: PBBFAC,,,

## 2025-08-22 PROCEDURE — 36592 COLLECT BLOOD FROM PICC: CPT

## 2025-08-22 RX ORDER — PROCHLORPERAZINE EDISYLATE 5 MG/ML
10 INJECTION INTRAMUSCULAR; INTRAVENOUS EVERY 6 HOURS PRN
Status: CANCELLED | OUTPATIENT
Start: 2025-08-22

## 2025-08-22 RX ORDER — ACYCLOVIR 200 MG/1
800 CAPSULE ORAL 2 TIMES DAILY
Status: CANCELLED | OUTPATIENT
Start: 2025-08-23

## 2025-08-22 RX ORDER — HEPARIN SODIUM 1000 [USP'U]/ML
1600 INJECTION, SOLUTION INTRAVENOUS; SUBCUTANEOUS
Status: CANCELLED | OUTPATIENT
Start: 2025-08-22

## 2025-08-22 RX ORDER — FLUCONAZOLE 200 MG/1
400 TABLET ORAL DAILY
Status: CANCELLED | OUTPATIENT
Start: 2025-08-23

## 2025-08-22 RX ORDER — SODIUM CHLORIDE 0.9 % (FLUSH) 0.9 %
10 SYRINGE (ML) INJECTION
Status: CANCELLED | OUTPATIENT
Start: 2025-08-22

## 2025-08-22 RX ORDER — OLANZAPINE 5 MG/1
5 TABLET, FILM COATED ORAL 2 TIMES DAILY
Status: CANCELLED | OUTPATIENT
Start: 2025-08-22 | End: 2025-08-26

## 2025-08-22 RX ORDER — DIPHENHYDRAMINE HYDROCHLORIDE 50 MG/ML
50 INJECTION, SOLUTION INTRAMUSCULAR; INTRAVENOUS ONCE AS NEEDED
Status: CANCELLED | OUTPATIENT
Start: 2025-08-22 | End: 2037-01-18

## 2025-08-22 RX ORDER — EPINEPHRINE 0.3 MG/.3ML
0.3 INJECTION SUBCUTANEOUS ONCE AS NEEDED
Status: CANCELLED | OUTPATIENT
Start: 2025-08-22 | End: 2037-01-18

## 2025-08-22 RX ORDER — DEXAMETHASONE 4 MG/1
12 TABLET ORAL
Status: CANCELLED | OUTPATIENT
Start: 2025-08-23 | End: 2025-08-24

## 2025-08-22 RX ORDER — LEVOFLOXACIN 500 MG/1
500 TABLET, FILM COATED ORAL DAILY
Status: CANCELLED | OUTPATIENT
Start: 2025-08-23

## 2025-08-22 RX ORDER — ONDANSETRON 8 MG/1
8 TABLET, ORALLY DISINTEGRATING ORAL
Status: CANCELLED | OUTPATIENT
Start: 2025-08-23 | End: 2025-08-26

## 2025-08-22 RX ORDER — LORAZEPAM 1 MG/1
1 TABLET ORAL EVERY 6 HOURS PRN
Status: CANCELLED | OUTPATIENT
Start: 2025-08-22

## 2025-08-22 RX ORDER — SODIUM CHLORIDE AND POTASSIUM CHLORIDE 150; 900 MG/100ML; MG/100ML
INJECTION, SOLUTION INTRAVENOUS CONTINUOUS
Status: CANCELLED | OUTPATIENT
Start: 2025-08-22

## 2025-08-22 RX ORDER — SODIUM CHLORIDE/SODIUM BICARB
1 POWDER (GRAM) MISCELLANEOUS 4 TIMES DAILY
Status: CANCELLED | OUTPATIENT
Start: 2025-08-22

## 2025-08-22 RX ORDER — SULFAMETHOXAZOLE AND TRIMETHOPRIM 800; 160 MG/1; MG/1
1 TABLET ORAL
Status: CANCELLED | OUTPATIENT
Start: 2025-09-23

## 2025-08-23 ENCOUNTER — PATIENT MESSAGE (OUTPATIENT)
Dept: HEMATOLOGY/ONCOLOGY | Facility: CLINIC | Age: 55
End: 2025-08-23
Payer: COMMERCIAL

## 2025-08-23 PROBLEM — E83.39 HYPOPHOSPHATEMIA: Status: ACTIVE | Noted: 2025-08-23

## 2025-08-25 PROBLEM — T38.0X5A STEROID-INDUCED HYPERGLYCEMIA: Status: ACTIVE | Noted: 2025-08-25

## 2025-08-25 PROBLEM — R73.9 STEROID-INDUCED HYPERGLYCEMIA: Status: ACTIVE | Noted: 2025-08-25

## 2025-08-25 PROBLEM — Z94.84 HISTORY OF AUTOLOGOUS STEM CELL TRANSPLANT: Status: ACTIVE | Noted: 2025-07-21

## 2025-08-27 ENCOUNTER — PATIENT MESSAGE (OUTPATIENT)
Dept: HEMATOLOGY/ONCOLOGY | Facility: CLINIC | Age: 55
End: 2025-08-27
Payer: COMMERCIAL

## 2025-08-27 PROBLEM — H53.8 BLURRY VISION, RIGHT EYE: Status: ACTIVE | Noted: 2025-08-27

## 2025-08-27 PROBLEM — K59.00 CONSTIPATION: Status: ACTIVE | Noted: 2025-08-27

## 2025-08-28 ENCOUNTER — TELEPHONE (OUTPATIENT)
Dept: HEMATOLOGY/ONCOLOGY | Facility: CLINIC | Age: 55
End: 2025-08-28
Payer: COMMERCIAL

## 2025-08-29 PROBLEM — D61.810 PANCYTOPENIA DUE TO CHEMOTHERAPY: Status: ACTIVE | Noted: 2025-08-22

## 2025-08-29 PROBLEM — T45.1X5A CHEMOTHERAPY-INDUCED NAUSEA: Status: ACTIVE | Noted: 2025-08-29

## 2025-08-29 PROBLEM — R11.0 CHEMOTHERAPY-INDUCED NAUSEA: Status: ACTIVE | Noted: 2025-08-29

## 2025-08-29 PROBLEM — R21 RASH AND NONSPECIFIC SKIN ERUPTION: Status: ACTIVE | Noted: 2025-08-29

## 2025-09-03 PROBLEM — R11.2 CHEMOTHERAPY-INDUCED NAUSEA AND VOMITING: Status: ACTIVE | Noted: 2025-08-29

## 2025-09-04 PROBLEM — H66.92 LEFT OTITIS MEDIA: Status: ACTIVE | Noted: 2025-09-04

## 2025-09-05 ENCOUNTER — TELEPHONE (OUTPATIENT)
Dept: HEMATOLOGY/ONCOLOGY | Facility: CLINIC | Age: 55
End: 2025-09-05
Payer: COMMERCIAL

## 2025-09-05 PROBLEM — K52.1 CHEMOTHERAPY INDUCED DIARRHEA: Status: ACTIVE | Noted: 2025-09-05

## 2025-09-05 PROBLEM — K59.00 CONSTIPATION: Status: RESOLVED | Noted: 2025-08-27 | Resolved: 2025-09-05

## 2025-09-05 PROBLEM — T45.1X5A CHEMOTHERAPY INDUCED DIARRHEA: Status: ACTIVE | Noted: 2025-09-05

## 2025-09-05 PROBLEM — D63.0 ANEMIA IN NEOPLASTIC DISEASE: Status: RESOLVED | Noted: 2025-08-22 | Resolved: 2025-09-05

## (undated) DEVICE — SYR ONLY LUER LOCK 20CC

## (undated) DEVICE — BLADE SURG CARBON STEEL SZ11

## (undated) DEVICE — DECANTER FLUID TRNSF WHITE 9IN

## (undated) DEVICE — SYR 10CC LUER LOCK

## (undated) DEVICE — APPLICATOR CHLORAPREP ORN 26ML

## (undated) DEVICE — NDL HYPO REG 25G X 1 1/2

## (undated) DEVICE — TIP YANKAUERS BULB NO VENT

## (undated) DEVICE — DRAPE T THYROID STERILE

## (undated) DEVICE — ELECTRODE REM PLYHSV RETURN 9

## (undated) DEVICE — DRESSING ANTIMICROBIAL 1 INCH

## (undated) DEVICE — SOL NACL 0.9% INJ PF/50151

## (undated) DEVICE — PENCIL ROCKER SWITCH 10FT CORD

## (undated) DEVICE — SUT MCRYL PLUS 4-0 PS2 27IN

## (undated) DEVICE — ADHESIVE DERMABOND ADVANCED

## (undated) DEVICE — TRAY MINOR GEN SURG OMC

## (undated) DEVICE — SUT PROLENE 2-0 30 SH

## (undated) DEVICE — CONTAINER SPECIMEN OR STER 4OZ

## (undated) DEVICE — ELECTRODE MEGADYNE RETURN DUAL

## (undated) DEVICE — DRAPE C-ARM ELAS CLIP 42X120IN

## (undated) DEVICE — SYR DISP LL 5CC